# Patient Record
Sex: MALE | Race: BLACK OR AFRICAN AMERICAN | Employment: FULL TIME | URBAN - METROPOLITAN AREA
[De-identification: names, ages, dates, MRNs, and addresses within clinical notes are randomized per-mention and may not be internally consistent; named-entity substitution may affect disease eponyms.]

---

## 2017-01-15 ENCOUNTER — HOSPITAL ENCOUNTER (EMERGENCY)
Age: 57
Discharge: HOME | End: 2017-01-15
Attending: EMERGENCY MEDICINE
Payer: COMMERCIAL

## 2017-01-15 VITALS
DIASTOLIC BLOOD PRESSURE: 94 MMHG | HEIGHT: 73 IN | OXYGEN SATURATION: 98 % | WEIGHT: 245 LBS | TEMPERATURE: 97.7 F | HEART RATE: 105 BPM | SYSTOLIC BLOOD PRESSURE: 147 MMHG | RESPIRATION RATE: 16 BRPM | BODY MASS INDEX: 32.47 KG/M2

## 2017-01-15 DIAGNOSIS — H92.01 OTALGIA, RIGHT: Primary | ICD-10-CM

## 2017-01-15 DIAGNOSIS — J02.9 VIRAL PHARYNGITIS: ICD-10-CM

## 2017-01-15 LAB — DEPRECATED S PYO AG THROAT QL EIA: NEGATIVE

## 2017-01-15 PROCEDURE — 87880 STREP A ASSAY W/OPTIC: CPT | Performed by: PHYSICIAN ASSISTANT

## 2017-01-15 PROCEDURE — 74011000250 HC RX REV CODE- 250: Performed by: PHYSICIAN ASSISTANT

## 2017-01-15 PROCEDURE — 99283 EMERGENCY DEPT VISIT LOW MDM: CPT

## 2017-01-15 PROCEDURE — 87070 CULTURE OTHR SPECIMN AEROBIC: CPT | Performed by: EMERGENCY MEDICINE

## 2017-01-15 RX ORDER — LIDOCAINE HYDROCHLORIDE 20 MG/ML
15 SOLUTION OROPHARYNGEAL
Status: COMPLETED | OUTPATIENT
Start: 2017-01-15 | End: 2017-01-15

## 2017-01-15 RX ORDER — ACETAMINOPHEN AND CODEINE PHOSPHATE 120; 12 MG/5ML; MG/5ML
1 SOLUTION ORAL
Qty: 120 ML | Refills: 0 | Status: SHIPPED | OUTPATIENT
Start: 2017-01-15 | End: 2018-02-10

## 2017-01-15 RX ADMIN — LIDOCAINE HYDROCHLORIDE 15 ML: 20 SOLUTION ORAL; TOPICAL at 22:20

## 2017-01-16 NOTE — ED NOTES
Patient educated on discharge instructions and one prescription . Patient verbalized understanding of eduction. Patient given discharge instructions and one prescription. Patient ambulated out of ED . No acute distress noted. Emergency Department Nursing Plan of Care       The Nursing Plan of Care is developed from the Nursing assessment and Emergency Department Attending provider initial evaluation. The plan of care may be reviewed in the ED Provider note.     The Plan of Care was developed with the following considerations:   Patient / Family readiness to learn indicated by:verbalized understanding  Persons(s) to be included in education: patient  Barriers to Learning/Limitations:No    Signed     Kelly Antunez RN    1/15/2017   10:55 PM

## 2017-01-16 NOTE — ED NOTES
Patient presented to the ED today for complaints of sore throat. Patient says symptoms started today. Respirations are even and unlabored. Skin is dry,wrm, and intact.

## 2017-01-16 NOTE — DISCHARGE INSTRUCTIONS
Earache: Care Instructions  Your Care Instructions  Even though infection is a common cause of ear pain, not all ear pain means an infection. If you have ear pain and don't have an infection, it could be because of a jaw problem, such as temporomandibular joint (TMJ) pain. Or it could be because of a neck problem. When ear discomfort or pain is mild or comes and goes without other symptoms, home treatment may be all you need. Follow-up care is a key part of your treatment and safety. Be sure to make and go to all appointments, and call your doctor if you are having problems. It's also a good idea to know your test results and keep a list of the medicines you take. How can you care for yourself at home? · Apply heat on the ear to ease pain. To apply heat, put a warm water bottle, a heating pad set on low, or a warm cloth on your ear. Do not go to sleep with a heating pad on your skin. · Take an over-the-counter pain medicine, such as acetaminophen (Tylenol), ibuprofen (Advil, Motrin), or naproxen (Aleve). Be safe with medicines. Read and follow all instructions on the label. · Do not take two or more pain medicines at the same time unless the doctor told you to. Many pain medicines have acetaminophen, which is Tylenol. Too much acetaminophen (Tylenol) can be harmful. · Never insert anything, such as a cotton swab or a sapna pin, into the ear. When should you call for help? Call your doctor now or seek immediate medical care if:  · You have a fever. · You feel a lump in your neck or jaw. · The area around the ear starts to swell. · There is pus or blood draining from the ear. · There is new or different drainage from the ear. Watch closely for changes in your health, and be sure to contact your doctor if:  · Your pain gets worse. · You do not get better as expected. Where can you learn more? Go to http://yolanda-edil.info/.   Enter R111 in the search box to learn more about \"Earache: Care Instructions. \"  Current as of: July 29, 2016  Content Version: 11.1  © 0752-7892 [employer]. Care instructions adapted under license by [employer] (which disclaims liability or warranty for this information). If you have questions about a medical condition or this instruction, always ask your healthcare professional. [address] any warranty or liability for your use of this information. Sore Throat: Care Instructions  Your Care Instructions    Infection by bacteria or a virus causes most sore throats. Cigarette smoke, dry air, air pollution, allergies, and yelling can also cause a sore throat. Sore throats can be painful and annoying. Fortunately, most sore throats go away on their own. If you have a bacterial infection, your doctor may prescribe antibiotics. Follow-up care is a key part of your treatment and safety. Be sure to make and go to all appointments, and call your doctor if you are having problems. It's also a good idea to know your test results and keep a list of the medicines you take. How can you care for yourself at home? · If your doctor prescribed antibiotics, take them as directed. Do not stop taking them just because you feel better. You need to take the full course of antibiotics. · Gargle with warm salt water once an hour to help reduce swelling and relieve discomfort. Use 1 teaspoon of salt mixed in 1 cup of warm water. · Take an over-the-counter pain medicine, such as acetaminophen (Tylenol), ibuprofen (Advil, Motrin), or naproxen (Aleve). Read and follow all instructions on the label. · Be careful when taking over-the-counter cold or flu medicines and Tylenol at the same time. Many of these medicines have acetaminophen, which is Tylenol. Read the labels to make sure that you are not taking more than the recommended dose. Too much acetaminophen (Tylenol) can be harmful. · Drink plenty of fluids.  Fluids may help soothe an irritated throat. Hot fluids, such as tea or soup, may help decrease throat pain. · Use over-the-counter throat lozenges to soothe pain. Regular cough drops or hard candy may also help. These should not be given to young children because of the risk of choking. · Do not smoke or allow others to smoke around you. If you need help quitting, talk to your doctor about stop-smoking programs and medicines. These can increase your chances of quitting for good. · Use a vaporizer or humidifier to add moisture to your bedroom. Follow the directions for cleaning the machine. When should you call for help? Call your doctor now or seek immediate medical care if:  · You have new or worse trouble swallowing. · Your sore throat gets much worse on one side. Watch closely for changes in your health, and be sure to contact your doctor if you do not get better as expected. Where can you learn more? Go to http://yolanda-edil.info/. Enter [id number] in the search box to learn more about \"Sore Throat: Care Instructions. \"  Current as of: July 29, 2016  Content Version: 11.1  © 3368-3863 [employer]. Care instructions adapted under license by [employer] (which disclaims liability or warranty for this information). If you have questions about a medical condition or this instruction, always ask your healthcare professional. [address] any warranty or liability for your use of this information.

## 2017-01-16 NOTE — ED PROVIDER NOTES
Patient is a 49 y.o. male presenting with sore throat. Sore Throat    This is a new problem. The current episode started less than 1 hour ago (pt reports immediate onset of ear and throat pain. Pt c/o painful swallowing as well. Pt reports recently getting \"partials\" over [place] time but unsure if that is related to symptoms today. Pain 10/10). The problem has not changed since onset. There has been no fever. Associated symptoms include ear pain. Pertinent negatives include no vomiting, no congestion, no shortness of breath, no stridor, no swollen glands, no trouble swallowing, no stiff neck and no cough. He has had no exposure to strep. He has tried nothing for the symptoms. Past Medical History:   Diagnosis Date    Gastrointestinal disorder      GERDS    GERD (gastroesophageal reflux disease)        Past Surgical History:   Procedure Laterality Date    Hx appendectomy      Hx other surgical       right eye surgery    Hx heent       right eye surgery    Hx urological       laser surgery on \"my private parts\"         History reviewed. No pertinent family history. Social History     Social History    Marital status:      Spouse name: N/A    Number of children: N/A    Years of education: N/A     Occupational History    Not on file. Social History Main Topics    Smoking status: Never Smoker    Smokeless tobacco: Not on file    Alcohol use No    Drug use: No    Sexual activity: Not on file     Other Topics Concern    Not on file     Social History Narrative         ALLERGIES: Review of patient's allergies indicates no known allergies. Review of Systems   Constitutional: Negative for fever. HENT: Positive for ear pain and sore throat. Negative for congestion and trouble swallowing. Respiratory: Negative for cough, shortness of breath and stridor. Gastrointestinal: Negative for vomiting. All other systems reviewed and are negative.       Vitals:    01/15/17 2205 BP: (!) 147/94   Pulse: (!) 108   Resp: 16   Temp: 97.7 °F (36.5 °C)   SpO2: 98%   Weight: 111.1 kg (245 lb)   Height: 6' 1\" (1.854 m)            Physical Exam   Constitutional: He is oriented to person, place, and time. He appears well-developed and well-nourished. No distress. HENT:   Head: Normocephalic and atraumatic. Right Ear: Tympanic membrane normal.   Left Ear: Tympanic membrane normal.   Mouth/Throat: Uvula is midline, oropharynx is clear and moist and mucous membranes are normal. No oropharyngeal exudate, posterior oropharyngeal edema, posterior oropharyngeal erythema or tonsillar abscesses. Eyes: Conjunctivae are normal.   Cardiovascular: Normal rate, regular rhythm and normal heart sounds. Pulmonary/Chest: Effort normal and breath sounds normal. No respiratory distress. He has no wheezes. He has no rales. Musculoskeletal: Normal range of motion. Lymphadenopathy:        Head (right side): Submandibular adenopathy present. Head (left side): Submandibular adenopathy present. He has cervical adenopathy. Neurological: He is alert and oriented to person, place, and time. Skin: Skin is warm and dry. Psychiatric: He has a normal mood and affect. His behavior is normal. Judgment and thought content normal.   Nursing note and vitals reviewed. MDM  Number of Diagnoses or Management Options  Diagnosis management comments: DDX: otalgia, OM, lymphadenopathy, lymphadenitis    Progress Note:  10:45 PM  Pt reevaluated, lidocaine helped pain. Pt advised of neg strep but will f/u if culture is positive.   Will send home with pain meds to use prn       Amount and/or Complexity of Data Reviewed  Clinical lab tests: ordered and reviewed      ED Course       Procedures

## 2017-01-18 LAB
BACTERIA SPEC CULT: NORMAL
SERVICE CMNT-IMP: NORMAL

## 2017-03-01 ENCOUNTER — HOSPITAL ENCOUNTER (OUTPATIENT)
Dept: LAB | Age: 57
Discharge: HOME | End: 2017-03-01

## 2017-03-01 PROCEDURE — 99001 SPECIMEN HANDLING PT-LAB: CPT | Performed by: INTERNAL MEDICINE

## 2017-03-09 ENCOUNTER — HOSPITAL ENCOUNTER (OUTPATIENT)
Dept: CT IMAGING | Age: 57
Discharge: HOME | End: 2017-03-09
Attending: INTERNAL MEDICINE
Payer: COMMERCIAL

## 2017-03-09 DIAGNOSIS — R04.2 HEMOPTYSIS: ICD-10-CM

## 2017-03-09 PROCEDURE — 71250 CT THORAX DX C-: CPT

## 2018-02-10 ENCOUNTER — HOSPITAL ENCOUNTER (EMERGENCY)
Age: 58
Discharge: HOME | End: 2018-02-10
Attending: EMERGENCY MEDICINE
Payer: COMMERCIAL

## 2018-02-10 ENCOUNTER — APPOINTMENT (OUTPATIENT)
Dept: GENERAL RADIOLOGY | Age: 58
End: 2018-02-10
Attending: PHYSICIAN ASSISTANT
Payer: COMMERCIAL

## 2018-02-10 VITALS
OXYGEN SATURATION: 96 % | BODY MASS INDEX: 32.2 KG/M2 | SYSTOLIC BLOOD PRESSURE: 145 MMHG | DIASTOLIC BLOOD PRESSURE: 92 MMHG | TEMPERATURE: 99.8 F | HEART RATE: 108 BPM | RESPIRATION RATE: 16 BRPM | WEIGHT: 243 LBS | HEIGHT: 73 IN

## 2018-02-10 DIAGNOSIS — J06.9 ACUTE UPPER RESPIRATORY INFECTION: Primary | ICD-10-CM

## 2018-02-10 PROCEDURE — 99282 EMERGENCY DEPT VISIT SF MDM: CPT

## 2018-02-10 PROCEDURE — 71046 X-RAY EXAM CHEST 2 VIEWS: CPT

## 2018-02-10 RX ORDER — AZITHROMYCIN 250 MG/1
TABLET, FILM COATED ORAL
Qty: 6 TAB | Refills: 0 | Status: SHIPPED | OUTPATIENT
Start: 2018-02-10 | End: 2018-07-07

## 2018-02-10 RX ORDER — LORATADINE 10 MG/1
10 TABLET ORAL DAILY
Qty: 10 TAB | Refills: 0 | Status: SHIPPED | OUTPATIENT
Start: 2018-02-10 | End: 2019-07-11

## 2018-02-10 RX ORDER — GUAIFENESIN, PSEUDOEPHEDRINE HYDROCHLORIDE 600; 60 MG/1; MG/1
1 TABLET, EXTENDED RELEASE ORAL EVERY 12 HOURS
COMMUNITY
End: 2019-07-11

## 2018-02-10 RX ORDER — BENZONATATE 200 MG/1
200 CAPSULE ORAL
Qty: 15 CAP | Refills: 0 | Status: SHIPPED | OUTPATIENT
Start: 2018-02-10 | End: 2018-07-07

## 2018-02-10 RX ORDER — IBUPROFEN 800 MG/1
800 TABLET ORAL
Qty: 20 TAB | Refills: 0 | Status: SHIPPED | OUTPATIENT
Start: 2018-02-10 | End: 2018-02-17

## 2018-02-10 NOTE — DISCHARGE INSTRUCTIONS
Upper Respiratory Infection (Cold): Care Instructions  Your Care Instructions    An upper respiratory infection, or URI, is an infection of the nose, sinuses, or throat. URIs are spread by coughs, sneezes, and direct contact. The common cold is the most frequent kind of URI. The flu and sinus infections are other kinds of URIs. Almost all URIs are caused by viruses. Antibiotics won't cure them. But you can treat most infections with home care. This may include drinking lots of fluids and taking over-the-counter pain medicine. You will probably feel better in 4 to 10 days. The doctor has checked you carefully, but problems can develop later. If you notice any problems or new symptoms, get medical treatment right away. Follow-up care is a key part of your treatment and safety. Be sure to make and go to all appointments, and call your doctor if you are having problems. It's also a good idea to know your test results and keep a list of the medicines you take. How can you care for yourself at home? · To prevent dehydration, drink plenty of fluids, enough so that your urine is light yellow or clear like water. Choose water and other caffeine-free clear liquids until you feel better. If you have kidney, heart, or liver disease and have to limit fluids, talk with your doctor before you increase the amount of fluids you drink. · Take an over-the-counter pain medicine, such as acetaminophen (Tylenol), ibuprofen (Advil, Motrin), or naproxen (Aleve). Read and follow all instructions on the label. · Before you use cough and cold medicines, check the label. These medicines may not be safe for young children or for people with certain health problems. · Be careful when taking over-the-counter cold or flu medicines and Tylenol at the same time. Many of these medicines have acetaminophen, which is Tylenol. Read the labels to make sure that you are not taking more than the recommended dose.  Too much acetaminophen (Tylenol) can be harmful. · Get plenty of rest.  · Do not smoke or allow others to smoke around you. If you need help quitting, talk to your doctor about stop-smoking programs and medicines. These can increase your chances of quitting for good. When should you call for help? Call 911 anytime you think you may need emergency care. For example, call if:  ? · You have severe trouble breathing. ?Call your doctor now or seek immediate medical care if:  ? · You seem to be getting much sicker. ? · You have new or worse trouble breathing. ? · You have a new or higher fever. ? · You have a new rash. ? Watch closely for changes in your health, and be sure to contact your doctor if:  ? · You have a new symptom, such as a sore throat, an earache, or sinus pain. ? · You cough more deeply or more often, especially if you notice more mucus or a change in the color of your mucus. ? · You do not get better as expected. Where can you learn more? Go to http://yolanda-edil.info/. Enter E075 in the search box to learn more about \"Upper Respiratory Infection (Cold): Care Instructions. \"  Current as of: May 12, 2017  Content Version: 11.4  © 7893-2240 [employer]. Care instructions adapted under license by [employer] (which disclaims liability or warranty for this information). If you have questions about a medical condition or this instruction, always ask your healthcare professional. [place] [address] any warranty or liability for your use of this information.

## 2018-02-10 NOTE — ED PROVIDER NOTES
EMERGENCY DEPARTMENT HISTORY AND PHYSICAL EXAM    Date: 2/10/2018  Patient Name: Sumaya Reaves    History of Presenting Illness     Chief Complaint   Patient presents with    Cough     and congestion starting this past monday. chills. History Provided By: Patient      HPI: Sumaya Reaves is a 56 y.o. male with a PMH of GERD who presents with c/o cough since Monday and now L ear pain and sore throat. Pt states he has been taking OTC cough medicine and sinus medicine with no relief. Pt reports generalized pain 10/10. No exacerbating factors. PCP: Thelma Bruno MD    Current Outpatient Prescriptions   Medication Sig Dispense Refill    PSEUDOEPHEDRINE-guaiFENesin [facility] D)  mg per tablet Take 1 Tab by mouth every twelve (12) hours.  azithromycin (ZITHROMAX) 250 mg tablet Take two tablets today then one tablet daily 6 Tab 0    ibuprofen (MOTRIN) 800 mg tablet Take 1 Tab by mouth every six (6) hours as needed for Pain for up to 7 days. 20 Tab 0    loratadine (CLARITIN) 10 mg tablet Take 1 Tab by mouth daily. 10 Tab 0    benzonatate (TESSALON) 200 mg capsule Take 1 Cap by mouth three (3) times daily as needed for Cough. 15 Cap 0       Past History     Past Medical History:  Past Medical History:   Diagnosis Date    Gastrointestinal disorder     GERDS    GERD (gastroesophageal reflux disease)        Past Surgical History:  Past Surgical History:   Procedure Laterality Date    HX APPENDECTOMY      HX HEENT      right eye surgery    HX OTHER SURGICAL      right eye surgery    HX UROLOGICAL      laser surgery on \"my private parts\"       Family History:  History reviewed. No pertinent family history. Social History:  Social History   Substance Use Topics    Smoking status: Never Smoker    Smokeless tobacco: Never Used    Alcohol use No       Allergies:  No Known Allergies      Review of Systems   Review of Systems   Constitutional: Negative for fever.    HENT: Positive for congestion, ear pain and sore throat. Respiratory: Positive for cough. Negative for shortness of breath, wheezing and stridor. Neurological: Negative for speech difficulty and weakness. All other systems reviewed and are negative. Physical Exam     Vitals:    02/10/18 1421   BP: (!) 145/92   Pulse: (!) 108   Resp: 16   Temp: 99.8 °F (37.7 °C)   SpO2: 96%   Weight: 110.2 kg (243 lb)   Height: 6' 1\" (1.854 m)     Physical Exam   Constitutional: He is oriented to person, place, and time. He appears well-developed and well-nourished. No distress. HENT:   Head: Normocephalic and atraumatic. Right Ear: Tympanic membrane normal.   Left Ear: Tympanic membrane normal.   Mouth/Throat: Uvula is midline, oropharynx is clear and moist and mucous membranes are normal. No oropharyngeal exudate. Eyes: Conjunctivae are normal.   Neck: Normal range of motion. Cardiovascular: Normal rate, regular rhythm and normal heart sounds. Pulmonary/Chest: Effort normal and breath sounds normal. No respiratory distress. He has no wheezes. He has no rales. Musculoskeletal: Normal range of motion. Neurological: He is alert and oriented to person, place, and time. Skin: Skin is warm and dry. Psychiatric: He has a normal mood and affect. His behavior is normal. Judgment and thought content normal.   Nursing note and vitals reviewed. at 3:22 PM        Diagnostic Study Results     Labs -   No results found for this or any previous visit (from the past 12 hour(s)). Radiologic Studies -   XR CHEST PA LAT   Final Result        CT Results  (Last 48 hours)    None        CXR Results  (Last 48 hours)               02/10/18 1440  XR CHEST PA LAT Final result    Impression:  Impression:   1. No acute cardiopulmonary disease           Narrative:  INDICATION:  cough x 1 wk        Exam: Chest 2 views. Comparison: October 17, 2013.         Findings: Cardiomediastinal silhouette is normal. Pulmonary vasculature is not engorged. No focal parenchymal opacities, effusions, or pneumothorax. Bony   thorax is intact. Medical Decision Making   I am the first provider for this patient. I reviewed the vital signs, available nursing notes, past medical history, past surgical history, family history and social history. Vital Signs-Reviewed the patient's vital signs. Disposition:  Discharged    DISCHARGE NOTE:   3:22 PM      Care plan outlined and precautions discussed. Patient has no new complaints, changes, or physical findings. Results of CXR were reviewed with the patient. All medications were reviewed with the patient; will d/c home. All of pt's questions and concerns were addressed. Patient was instructed and agrees to follow up with PCP, as well as to return to the ED upon further deterioration. Patient is ready to go home. Follow-up Information     Follow up With Details Comments [address], [place] Schedule an appointment as soon as possible for a visit in 2 days As needed [address] [place] [address]  [phone]            Current Discharge Medication List      START taking these medications    Details   azithromycin (ZITHROMAX) 250 mg tablet Take two tablets today then one tablet daily  Qty: 6 Tab, Refills: 0      ibuprofen (MOTRIN) 800 mg tablet Take 1 Tab by mouth every six (6) hours as needed for Pain for up to 7 days. Qty: 20 Tab, Refills: 0      loratadine (CLARITIN) 10 mg tablet Take 1 Tab by mouth daily. Qty: 10 Tab, Refills: 0      benzonatate (TESSALON) 200 mg capsule Take 1 Cap by mouth three (3) times daily as needed for Cough. Qty: 15 Cap, Refills: 0         CONTINUE these medications which have NOT CHANGED    Details   PSEUDOEPHEDRINE-guaiFENesin (MUCINEX D)  mg per tablet Take 1 Tab by mouth every twelve (12) hours.              Provider Notes (Medical Decision Making):   DDX: URI, PNA, bronchitis    Procedures        Diagnosis     Clinical Impression:   1.  Acute upper respiratory infection

## 2018-02-10 NOTE — ED NOTES
Emergency Department Nursing Plan of Care       The Nursing Plan of Care is developed from the Nursing assessment and Emergency Department Attending provider initial evaluation. The plan of care may be reviewed in the ED Provider note.     The Plan of Care was developed with the following considerations:   Patient / Family readiness to learn indicated by:verbalized understanding  Persons(s) to be included in education: patient  Barriers to Learning/Limitations:No    [address] [place], RN    2/10/2018   2:55 PM

## 2018-07-07 ENCOUNTER — HOSPITAL ENCOUNTER (EMERGENCY)
Age: 58
Discharge: HOME | End: 2018-07-07
Attending: EMERGENCY MEDICINE
Payer: COMMERCIAL

## 2018-07-07 VITALS
OXYGEN SATURATION: 99 % | RESPIRATION RATE: 16 BRPM | TEMPERATURE: 97.5 F | BODY MASS INDEX: 31.56 KG/M2 | SYSTOLIC BLOOD PRESSURE: 150 MMHG | HEART RATE: 83 BPM | DIASTOLIC BLOOD PRESSURE: 98 MMHG | HEIGHT: 72 IN | WEIGHT: 233 LBS

## 2018-07-07 DIAGNOSIS — L02.92 BOIL: Primary | ICD-10-CM

## 2018-07-07 PROCEDURE — 99283 EMERGENCY DEPT VISIT LOW MDM: CPT

## 2018-07-07 PROCEDURE — 74011250637 HC RX REV CODE- 250/637: Performed by: EMERGENCY MEDICINE

## 2018-07-07 RX ORDER — SULFAMETHOXAZOLE AND TRIMETHOPRIM 800; 160 MG/1; MG/1
1 TABLET ORAL 2 TIMES DAILY
Qty: 20 TAB | Refills: 0 | Status: SHIPPED | OUTPATIENT
Start: 2018-07-07 | End: 2018-07-17

## 2018-07-07 RX ORDER — SULFAMETHOXAZOLE AND TRIMETHOPRIM 800; 160 MG/1; MG/1
1 TABLET ORAL
Status: COMPLETED | OUTPATIENT
Start: 2018-07-07 | End: 2018-07-07

## 2018-07-07 RX ADMIN — SULFAMETHOXAZOLE AND TRIMETHOPRIM 1 TABLET: 800; 160 TABLET ORAL at 09:48

## 2018-07-07 NOTE — ED TRIAGE NOTES
States that he has abscess near umbilicus. Used hot water compresses until it burst. Questions infection.

## 2018-07-07 NOTE — ED NOTES
Discharge instructions were given to the patient by JOSE Colbert RN. .     The patient left the Emergency Department ambulatory, alert and oriented and in no acute distress with 1 prescription(s). The patient was encouraged to call or return to the ED for worsening symptoms or problems and was encouraged to schedule a follow up appointment for continuing care. Patient leaving ED accompanied by self. The patient verbalized understanding of discharge instructions and prescriptions, all questions were answered. The patient has no further concerns at this time. Patient declined wheelchair transfer upon ED discharge.

## 2018-07-07 NOTE — ED PROVIDER NOTES
EMERGENCY DEPARTMENT HISTORY AND PHYSICAL EXAM      Date: 7/7/2018  Patient Name: Orlando Patient    History of Presenting Illness     Chief Complaint   Patient presents with    Abscess       History Provided By: Patient    HPI: Orlando Patient, 66 y.o. male with PMHx significant for GERD, presents ambulatory to the ED with cc of boil to lower abdomen for the past 3-4 days. He states he applied a warm compress to boil until it burst. He reports similar episodes in the past. He denies fever, chills, NVD, or weakness. There are no other complaints, changes, or physical findings at this time. PCP: Jessica Peña MD    Current Facility-Administered Medications   Medication Dose Route Frequency Provider Last Rate Last Dose    trimethoprim-sulfamethoxazole (BACTRIM DS, SEPTRA DS) 160-800 mg per tablet 1 Tab  1 Tab Oral NOW Immanuel Arnold MD         Current Outpatient Prescriptions   Medication Sig Dispense Refill    trimethoprim-sulfamethoxazole (BACTRIM DS) 160-800 mg per tablet Take 1 Tab by mouth two (2) times a day for 10 days. 20 Tab 0    PSEUDOEPHEDRINE-guaiFENesin (MUCINEX D)  mg per tablet Take 1 Tab by mouth every twelve (12) hours.  azithromycin (ZITHROMAX) 250 mg tablet Take two tablets today then one tablet daily 6 Tab 0    loratadine (CLARITIN) 10 mg tablet Take 1 Tab by mouth daily. 10 Tab 0    benzonatate (TESSALON) 200 mg capsule Take 1 Cap by mouth three (3) times daily as needed for Cough. 15 Cap 0       Past History     Past Medical History:  Past Medical History:   Diagnosis Date    Gastrointestinal disorder     GERDS    GERD (gastroesophageal reflux disease)        Past Surgical History:  Past Surgical History:   Procedure Laterality Date    HX APPENDECTOMY      HX HEENT      right eye surgery    HX OTHER SURGICAL      right eye surgery    HX UROLOGICAL      laser surgery on \"my private parts\"       Family History:  No family history on file.     Social History:  Social History   Substance Use Topics    Smoking status: Never Smoker    Smokeless tobacco: Never Used    Alcohol use No       Allergies:  No Known Allergies      Review of Systems   Review of Systems   Constitutional: Negative for fever. HENT: Negative for sore throat and trouble swallowing. Eyes: Negative for photophobia and redness. Respiratory: Negative for cough and shortness of breath. Cardiovascular: Negative for chest pain and leg swelling. Gastrointestinal: Negative for abdominal pain, constipation, diarrhea, nausea and vomiting. Endocrine: Negative for polydipsia and polyuria. Genitourinary: Negative for dysuria, hematuria and scrotal swelling. Musculoskeletal: Negative for back pain and joint swelling. Skin: Positive for wound (boil to lower abdomen). Negative for rash. Neurological: Negative for dizziness, syncope, weakness and headaches. Psychiatric/Behavioral: Negative for suicidal ideas. All other systems reviewed and are negative. Physical Exam   Physical Exam   Constitutional: He is oriented to person, place, and time. He appears well-developed and well-nourished. No distress. HENT:   Head: Normocephalic and atraumatic. Mouth/Throat: Oropharynx is clear and moist. No oropharyngeal exudate. Eyes: Conjunctivae and EOM are normal. Pupils are equal, round, and reactive to light. Left eye exhibits no discharge. Neck: Normal range of motion. Neck supple. No JVD present. Cardiovascular: Normal rate, regular rhythm, normal heart sounds and intact distal pulses. Pulmonary/Chest: Effort normal and breath sounds normal. No respiratory distress. He has no wheezes. Abdominal: Soft. Bowel sounds are normal. He exhibits no distension. There is no tenderness. There is no rebound and no guarding. A hernia is present. Umbilical hernia that is soft and reducible   Musculoskeletal: Normal range of motion. He exhibits no edema or tenderness.    Lymphadenopathy:     He has no cervical adenopathy. Neurological: He is alert and oriented to person, place, and time. He has normal reflexes. No cranial nerve deficit. Skin: Skin is warm and dry. No rash noted. Draining boil to RLQ of anterior abdominal wall with surrounding erythema draining pus. No crepitus   Psychiatric: He has a normal mood and affect. His behavior is normal.   Nursing note and vitals reviewed. Diagnostic Study Results         Medical Decision Making   I am the first provider for this patient. I reviewed the vital signs, available nursing notes, past medical history, past surgical history, family history and social history. Vital Signs-Reviewed the patient's vital signs. Patient Vitals for the past 12 hrs:   Temp Pulse Resp BP SpO2   07/07/18 0941 97.5 °F (36.4 °C) 83 16 (!) 150/98 99 %       Pulse Oximetry Analysis - 99% on RA    Cardiac Monitor:   Rate: 83 bpm  Rhythm: Normal Sinus Rhythm      Records Reviewed: Nursing Notes and Old Medical Records    Provider Notes (Medical Decision Making):   DDx: boil, skin abscess, umbilical hernia    ED Course:   Initial assessment performed. The patients presenting problems have been discussed, and they are in agreement with the care plan formulated and outlined with them. I have encouraged them to ask questions as they arise throughout their visit. Critical Care Time:   0 minutes    Disposition:  DISCHARGE NOTE:  9:44 AM  The patient is ready for discharge. The patients signs, symptoms, diagnosis, and instructions for discharge have been discussed and the pt has conveyed their understanding. The patient is to follow up as recommended or return to the ER should their symptoms worsen. Plan has been discussed and patient has conveyed their agreement. PLAN: Discharge home  1.    Current Discharge Medication List      START taking these medications    Details   trimethoprim-sulfamethoxazole (BACTRIM DS) 160-800 mg per tablet Take 1 Tab by mouth two (2) times a day for 10 days. Qty: 20 Tab, Refills: 0           2. Follow-up Information     Follow up With Details Comments [address], [place] In 1 week  [address] [facility]  [facility] [address] [phone]          Return to ED if worse     Diagnosis     Clinical Impression:   1. Boil        Attestations: This note is prepared by Marya Bishop, acting as Scribe for Cristhian Valle MD.    Cristhian Valle MD: The scribe's documentation has been prepared under my direction and personally reviewed by me in its entirety. I confirm that the note above accurately reflects all work, treatment, procedures, and medical decision making performed by me.

## 2018-07-07 NOTE — ED NOTES
Emergency Department Nursing Plan of Care       The Nursing Plan of Care is developed from the Nursing assessment and Emergency Department Attending provider initial evaluation. The plan of care may be reviewed in the ED Provider note. The Plan of Care was developed with the following considerations:   Patient / Family readiness to learn indicated by:verbalized understanding  Persons(s) to be included in education: patient  Barriers to Learning/Limitations:No    Signed     Jose Guadalupe Cantrell    7/7/2018   9:50 AM    See nursing assessment    Patient is alert and oriented x 4 and in no acute distress at this time. Respirations are at a regular rate, depth, and pattern. Patient updated on plan of care and has no questions or concerns at this time.

## 2019-03-15 ENCOUNTER — HOSPITAL ENCOUNTER (EMERGENCY)
Age: 59
Discharge: HOME | End: 2019-03-15
Attending: EMERGENCY MEDICINE
Payer: COMMERCIAL

## 2019-03-15 VITALS
WEIGHT: 243 LBS | HEIGHT: 72 IN | HEART RATE: 90 BPM | RESPIRATION RATE: 16 BRPM | TEMPERATURE: 97.5 F | SYSTOLIC BLOOD PRESSURE: 126 MMHG | OXYGEN SATURATION: 96 % | DIASTOLIC BLOOD PRESSURE: 114 MMHG | BODY MASS INDEX: 32.91 KG/M2

## 2019-03-15 DIAGNOSIS — L02.91 ABSCESS: Primary | ICD-10-CM

## 2019-03-15 PROCEDURE — 99282 EMERGENCY DEPT VISIT SF MDM: CPT

## 2019-03-15 RX ORDER — SULFAMETHOXAZOLE AND TRIMETHOPRIM 800; 160 MG/1; MG/1
1 TABLET ORAL 2 TIMES DAILY
Qty: 20 TAB | Refills: 0 | Status: SHIPPED | OUTPATIENT
Start: 2019-03-15 | End: 2019-03-25

## 2019-03-15 RX ORDER — CEPHALEXIN 500 MG/1
500 CAPSULE ORAL 3 TIMES DAILY
Qty: 30 CAP | Refills: 0 | Status: SHIPPED | OUTPATIENT
Start: 2019-03-15 | End: 2019-03-25

## 2019-03-15 NOTE — LETTER
[facility] EMERGENCY DEPT 
[address] [facility] [address] [id number] [phone] Work/School Note Date: 3/15/2019 To Whom It May concern: 
 
Farshad Ocampo was seen and treated today in the emergency room by the following provider(s): 
Attending Provider: Dalila Corona MD 
Physician Assistant: CRISTAL Joiner. Farshad Ocampo may return to work on 3/16/19. Sincerely, CRISTAL Vila

## 2019-03-15 NOTE — ED TRIAGE NOTES
Pt reports to ED with c/o a boil on the right side of his neck x1days. Nondraining lesion.  Hx of MRSA

## 2019-03-15 NOTE — ED NOTES
Emergency Department Nursing Plan of Care       The Nursing Plan of Care is developed from the Nursing assessment and Emergency Department Attending provider initial evaluation. The plan of care may be reviewed in the ED Provider note.     The Plan of Care was developed with the following considerations:   Patient / Family readiness to learn indicated by:verbalized understanding  Persons(s) to be included in education: patient  Barriers to Learning/Limitations:No    Signed     Rosales Rahman RN    3/15/2019   11:53 AM

## 2019-03-15 NOTE — ED PROVIDER NOTES
EMERGENCY DEPARTMENT HISTORY AND PHYSICAL EXAM    Date: 3/15/2019  Patient Name: Robby Parker    History of Presenting Illness     Chief Complaint   Patient presents with    Skin Problem         History Provided By: Patient        HPI: Robby Parker is a 66 y.o. male with a PMH of GERD who presents with cc of a boil to his neck that started last night. Pt states he noted it the pain last night and then it got bigger today prompting his arrival. Pt denies applying warm compress or any meds to the area. He denies any abd pain,  fevers, chills, nausea, vomiting, chest pain, shortness of breath, headache, rash, diarrhea, sweating or weight loss. All other ROS negative at this time  Pt is in no acute distress and is speaking in full sentences      PCP: Dell Corado MD    Current Outpatient Medications   Medication Sig Dispense Refill    trimethoprim-sulfamethoxazole (BACTRIM DS) 160-800 mg per tablet Take 1 Tab by mouth two (2) times a day for 10 days. 20 Tab 0    cephALEXin (KEFLEX) 500 mg capsule Take 1 Cap by mouth three (3) times daily for 10 days. 30 Cap 0    PSEUDOEPHEDRINE-guaiFENesin (MUCINEX D)  mg per tablet Take 1 Tab by mouth every twelve (12) hours.  loratadine (CLARITIN) 10 mg tablet Take 1 Tab by mouth daily. 10 Tab 0       Past History     Past Medical History:  Past Medical History:   Diagnosis Date    Gastrointestinal disorder     GERDS    GERD (gastroesophageal reflux disease)        Past Surgical History:  Past Surgical History:   Procedure Laterality Date    HX APPENDECTOMY      HX HEENT      right eye surgery    HX OTHER SURGICAL      right eye surgery    HX UROLOGICAL      laser surgery on \"my private parts\"       Family History:  History reviewed. No pertinent family history. Social History:  Social History     Tobacco Use    Smoking status: Never Smoker    Smokeless tobacco: Never Used   Substance Use Topics    Alcohol use: No    Drug use:  No Allergies:  No Known Allergies      Review of Systems   Review of Systems   Constitutional: Negative. Negative for chills and fever. HENT: Negative. Eyes: Negative. Respiratory: Negative. Negative for shortness of breath. Cardiovascular: Negative. Negative for chest pain. Gastrointestinal: Negative. Negative for abdominal pain, diarrhea, nausea and vomiting. Endocrine: Negative. Genitourinary: Negative. Musculoskeletal: Negative for arthralgias. Skin: Positive for color change and rash. Allergic/Immunologic: Negative. Neurological: Negative. Negative for headaches. Hematological: Negative. Psychiatric/Behavioral: Negative. All other systems reviewed and are negative. Physical Exam     Vitals:    03/15/19 1034   BP: (!) 126/114   Pulse: 90   Resp: 16   Temp: 97.5 °F (36.4 °C)   SpO2: 96%   Weight: 110.2 kg (243 lb)   Height: 6' (1.829 m)     Physical Exam   Constitutional: He is oriented to person, place, and time. He appears well-developed and well-nourished. No distress. HENT:   Head: Normocephalic and atraumatic. Right Ear: External ear normal.   Left Ear: External ear normal.   Nose: Nose normal.   Mouth/Throat: Oropharynx is clear and moist.   Eyes: Conjunctivae and EOM are normal. Pupils are equal, round, and reactive to light. Neck: Normal range of motion and full passive range of motion without pain. Neck supple. No JVD present. No tracheal tenderness, no spinous process tenderness and no muscular tenderness present. No neck rigidity. No tracheal deviation, no edema and normal range of motion present. No thyromegaly present. Cardiovascular: Normal rate, normal heart sounds and intact distal pulses. Pulmonary/Chest: Effort normal and breath sounds normal. No stridor. No respiratory distress. He has no wheezes. He has no rales. Abdominal: Soft. Bowel sounds are normal. He exhibits no distension. There is no tenderness.  There is no rebound, no guarding, no CVA tenderness, no tenderness at McBurney's point and negative Gipson's sign. Musculoskeletal: Normal range of motion. Lymphadenopathy:     He has no cervical adenopathy. Neurological: He is alert and oriented to person, place, and time. He has normal reflexes. He displays normal reflexes. No cranial nerve deficit. He exhibits normal muscle tone. Coordination normal.   Skin: No rash noted. He is not diaphoretic. Psychiatric: He has a normal mood and affect. His behavior is normal. Judgment and thought content normal.   Nursing note and vitals reviewed. Diagnostic Study Results     Labs -   No results found for this or any previous visit (from the past 12 hour(s)). Radiologic Studies -   No orders to display     CT Results  (Last 48 hours)    None        CXR Results  (Last 48 hours)    None            Medical Decision Making   I am the first provider for this patient. I reviewed the vital signs, available nursing notes, past medical history, past surgical history, family history and social history. Vital Signs-Reviewed the patient's vital signs. Records Reviewed: Nursing Notes, Old Medical Records, Previous Radiology Studies and Previous Laboratory Studies    Disposition:  Discharge     DISCHARGE NOTE:   Care plan outlined and precautions discussed. Patient has no new complaints, changes, or physical findings. Results of visit were reviewed with the patient. All medications were reviewed with the patient; will d/c home. All of pt's questions and concerns were addressed. Patient was instructed and agrees to follow up with pcp, as well as to return to the ED upon further deterioration. Patient is ready to go home.       Follow-up Information     Follow up With Specialties Details Why Contact Info    Twyla Parker MD [facility] Schedule an appointment as soon as possible for a visit in 3 days If symptoms worsen [address] [place] [address]  [phone] Discharge Medication List as of 3/15/2019 11:37 AM      START taking these medications    Details   trimethoprim-sulfamethoxazole (BACTRIM DS) 160-800 mg per tablet Take 1 Tab by mouth two (2) times a day for 10 days. , Normal, Disp-20 Tab, R-0      cephALEXin (KEFLEX) 500 mg capsule Take 1 Cap by mouth three (3) times daily for 10 days. , Normal, Disp-30 Cap, R-0         CONTINUE these medications which have NOT CHANGED    Details   PSEUDOEPHEDRINE-guaiFENesin (MUCINEX D)  mg per tablet Take 1 Tab by mouth every twelve (12) hours. , Historical Med      loratadine (CLARITIN) 10 mg tablet Take 1 Tab by mouth daily. , Normal, Disp-10 Tab, R-0             Provider Notes (Medical Decision Making): Abscess to anterior neck, no area of fluctuance will advise warm compress with abx coverage and to return in 2-3 days for recheck   Worsening si/sxs discussed extensively   Follow up with PCP or RTC if symptoms/signs worsen  Side effects of medication discussed  Education materials provided at discharge   Pt verbalizes agreement with plan      Procedures:  Procedures        Diagnosis     Clinical Impression:   1.  Abscess

## 2019-03-15 NOTE — DISCHARGE INSTRUCTIONS

## 2019-03-21 ENCOUNTER — HOSPITAL ENCOUNTER (EMERGENCY)
Age: 59
Discharge: HOME | End: 2019-03-21
Attending: EMERGENCY MEDICINE
Payer: COMMERCIAL

## 2019-03-21 VITALS
HEIGHT: 72 IN | BODY MASS INDEX: 31.83 KG/M2 | OXYGEN SATURATION: 100 % | DIASTOLIC BLOOD PRESSURE: 93 MMHG | SYSTOLIC BLOOD PRESSURE: 127 MMHG | WEIGHT: 235 LBS | HEART RATE: 108 BPM | TEMPERATURE: 98.4 F | RESPIRATION RATE: 18 BRPM

## 2019-03-21 DIAGNOSIS — R68.89 FLU-LIKE SYMPTOMS: Primary | ICD-10-CM

## 2019-03-21 PROCEDURE — 99283 EMERGENCY DEPT VISIT LOW MDM: CPT

## 2019-03-21 RX ORDER — ACETAMINOPHEN 500 MG
1000 TABLET ORAL
Qty: 20 TAB | Refills: 0 | Status: SHIPPED | OUTPATIENT
Start: 2019-03-21 | End: 2019-08-21

## 2019-03-21 RX ORDER — IBUPROFEN 800 MG/1
800 TABLET ORAL
Qty: 20 TAB | Refills: 0 | Status: SHIPPED | OUTPATIENT
Start: 2019-03-21 | End: 2019-03-28

## 2019-03-21 NOTE — DISCHARGE INSTRUCTIONS
Patient Education        Influenza (Flu): Care Instructions  Your Care Instructions    Influenza (flu) is an infection in the lungs and breathing passages. It is caused by the influenza virus. There are different strains, or types, of the flu virus from year to year. Unlike the common cold, the flu comes on suddenly and the symptoms, such as a cough, congestion, fever, chills, fatigue, aches, and pains, are more severe. These symptoms may last up to 10 days. Although the flu can make you feel very sick, it usually doesn't cause serious health problems. Home treatment is usually all you need for flu symptoms. But your doctor may prescribe antiviral medicine to prevent other health problems, such as pneumonia, from developing. Older people and those who have a long-term health condition, such as lung disease, are most at risk for having pneumonia or other health problems. Follow-up care is a key part of your treatment and safety. Be sure to make and go to all appointments, and call your doctor if you are having problems. It's also a good idea to know your test results and keep a list of the medicines you take. How can you care for yourself at home? · Get plenty of rest.  · Drink plenty of fluids, enough so that your urine is light yellow or clear like water. If you have kidney, heart, or liver disease and have to limit fluids, talk with your doctor before you increase the amount of fluids you drink. · Take an over-the-counter pain medicine if needed, such as acetaminophen (Tylenol), ibuprofen (Advil, Motrin), or naproxen (Aleve), to relieve fever, headache, and muscle aches. Read and follow all instructions on the label. No one younger than 20 should take aspirin. It has been linked to Reye syndrome, a serious illness. · Do not smoke. Smoking can make the flu worse. If you need help quitting, talk to your doctor about stop-smoking programs and medicines.  These can increase your chances of quitting for good.  · Breathe moist air from a hot shower or from a sink filled with hot water to help clear a stuffy nose. · Before you use cough and cold medicines, check the label. These medicines may not be safe for young children or for people with certain health problems. · If the skin around your nose and lips becomes sore, put some petroleum jelly on the area. · To ease coughing:  ? Drink fluids to soothe a scratchy throat. ? Suck on cough drops or plain hard candy. ? Take an over-the-counter cough medicine that contains dextromethorphan to help you get some sleep. Read and follow all instructions on the label. ? Raise your head at night with an extra pillow. This may help you rest if coughing keeps you awake. · Take any prescribed medicine exactly as directed. Call your doctor if you think you are having a problem with your medicine. To avoid spreading the flu  · Wash your hands regularly, and keep your hands away from your face. · Stay home from school, work, and other public places until you are feeling better and your fever has been gone for at least 24 hours. The fever needs to have gone away on its own without the help of medicine. · Ask people living with you to talk to their doctors about preventing the flu. They may get antiviral medicine to keep from getting the flu from you. · To prevent the flu in the future, get a flu vaccine every fall. Encourage people living with you to get the vaccine. · Cover your mouth when you cough or sneeze. When should you call for help? Call 911 anytime you think you may need emergency care.  For example, call if:    · You have severe trouble breathing.    Call your doctor now or seek immediate medical care if:    · You have new or worse trouble breathing.     · You seem to be getting much sicker.     · You feel very sleepy or confused.     · You have a new or higher fever.     · You get a new rash.    Watch closely for changes in your health, and be sure to contact your doctor if:    · You begin to get better and then get worse.     · You are not getting better after 1 week. Where can you learn more? Go to http://yolanda-edil.info/. Enter F411 in the search box to learn more about \"Influenza (Flu): Care Instructions. \"  Current as of: September 5, 2018  Content Version: 11.9  © 8922-2536 [employer]. Care instructions adapted under license by [employer] (which disclaims liability or warranty for this information). If you have questions about a medical condition or this instruction, always ask your healthcare professional. [place] [address] any warranty or liability for your use of this information.

## 2019-03-21 NOTE — ED PROVIDER NOTES
EMERGENCY DEPARTMENT HISTORY AND PHYSICAL EXAM    Date: 3/21/2019  Patient Name: Mary Saeed    History of Presenting Illness     Chief Complaint   Patient presents with    Generalized Body Aches    Cough         History Provided By: Patient      HPI: Mary Saeed is a 66 y.o. male with a PMH of GERD, appendectomy who presents with acute moderate dry cough, congestion, rhinorrhea, generalized body aches. Patient endorses symptoms started 5 days prior to arrival.  Patient has been taking Keflex and Bactrim for anterior neck abscess with moderate improvement of rash. He denies fever, chills, nausea, vomiting, abdominal pain, chest pain, shortness of breath, hemoptysis, wheezing, lightheadedness, dizziness, sore throat. No other modifying factors. PCP: Cora Vicente MD    Current Outpatient Medications   Medication Sig Dispense Refill    acetaminophen (TYLENOL) 500 mg tablet Take 2 Tabs by mouth every six (6) hours as needed for Pain. 20 Tab 0    loratadine-pseudoephedrine (CLARITIN-D 12 HOUR) 5-120 mg per tablet Take 1 Tab by mouth two (2) times a day. 30 Tab 0    ibuprofen (MOTRIN) 800 mg tablet Take 1 Tab by mouth every six (6) hours as needed for Pain for up to 7 days. 20 Tab 0    trimethoprim-sulfamethoxazole (BACTRIM DS) 160-800 mg per tablet Take 1 Tab by mouth two (2) times a day for 10 days. 20 Tab 0    cephALEXin (KEFLEX) 500 mg capsule Take 1 Cap by mouth three (3) times daily for 10 days. 30 Cap 0    PSEUDOEPHEDRINE-guaiFENesin (MUCINEX D)  mg per tablet Take 1 Tab by mouth every twelve (12) hours.  loratadine (CLARITIN) 10 mg tablet Take 1 Tab by mouth daily.  10 Tab 0       Past History     Past Medical History:  Past Medical History:   Diagnosis Date    Gastrointestinal disorder     GERDS    GERD (gastroesophageal reflux disease)        Past Surgical History:  Past Surgical History:   Procedure Laterality Date    HX APPENDECTOMY      HX HEENT      right eye surgery    HX OTHER SURGICAL      right eye surgery    HX UROLOGICAL      laser surgery on \"my private parts\"       Family History:  History reviewed. No pertinent family history. Social History:  Social History     Tobacco Use    Smoking status: Never Smoker    Smokeless tobacco: Never Used   Substance Use Topics    Alcohol use: No    Drug use: No     Types: Marijuana       Allergies:  No Known Allergies      Review of Systems   Review of Systems   Constitutional: Positive for fatigue. Negative for activity change, appetite change, chills, diaphoresis, fever and unexpected weight change. HENT: Positive for congestion and rhinorrhea. Negative for dental problem, drooling, ear discharge, ear pain, facial swelling, hearing loss, nosebleeds, postnasal drip, sinus pressure, sore throat, trouble swallowing and voice change. Eyes: Negative. Negative for pain, redness and visual disturbance. Respiratory: Positive for cough. Negative for apnea, chest tightness, shortness of breath, wheezing and stridor. Cardiovascular: Negative. Negative for chest pain. Gastrointestinal: Negative for abdominal pain, constipation, diarrhea, nausea and vomiting. Genitourinary: Negative. Negative for decreased urine volume and dysuria. Musculoskeletal: Positive for myalgias. Negative for arthralgias, neck pain and neck stiffness. Skin: Negative. Negative for rash. Neurological: Negative for dizziness, tremors, seizures, syncope, facial asymmetry, speech difficulty, weakness, light-headedness, numbness and headaches. Psychiatric/Behavioral: Negative. Negative for confusion. Physical Exam     Vitals:    03/21/19 1214 03/21/19 1215   BP: (!) 121/107 (!) 127/93   Pulse: (!) 107 (!) 108   Resp: 18    Temp: 98.4 °F (36.9 °C)    SpO2: 100%    Weight: 106.6 kg (235 lb)    Height: 6' (1.829 m)      Physical Exam   Constitutional: He is oriented to person, place, and time. He appears well-developed and well-nourished.  No distress. HENT:   Head: Normocephalic and atraumatic. Right Ear: Hearing, tympanic membrane and external ear normal.   Left Ear: Hearing, tympanic membrane and external ear normal.   Nose: Mucosal edema and rhinorrhea present. Right sinus exhibits no maxillary sinus tenderness and no frontal sinus tenderness. Left sinus exhibits no maxillary sinus tenderness and no frontal sinus tenderness. Mouth/Throat: Uvula is midline, oropharynx is clear and moist and mucous membranes are normal. Mucous membranes are not dry. No trismus in the jaw. No uvula swelling. No oropharyngeal exudate, posterior oropharyngeal edema, posterior oropharyngeal erythema or tonsillar abscesses. Eyes: Pupils are equal, round, and reactive to light. Conjunctivae and EOM are normal.   Neck: Normal range of motion, full passive range of motion without pain and phonation normal. No spinous process tenderness and no muscular tenderness present. Well-healing anterior neck nodule approximately 2 cm in size. No erythema, drainage, induration, fluctuance, streaking. Pulmonary/Chest: Effort normal and breath sounds normal. No accessory muscle usage. No respiratory distress. He has no decreased breath sounds. He has no wheezes. He has no rhonchi. He has no rales. Patient speaking in clear sentences in no apparent distress. Abdominal: There is no tenderness. Musculoskeletal: Normal range of motion. Neurological: He is alert and oriented to person, place, and time. Skin: Skin is warm, dry and intact. He is not diaphoretic. No pallor. Psychiatric: He has a normal mood and affect. His speech is normal and behavior is normal. Judgment and thought content normal.   Nursing note and vitals reviewed. Diagnostic Study Results     Labs -   No results found for this or any previous visit (from the past 12 hour(s)).     Radiologic Studies -   No orders to display     CT Results  (Last 48 hours)    None        CXR Results  (Last 48 hours)    None            Medical Decision Making   I am the first provider for this patient. I reviewed the vital signs, available nursing notes, past medical history, past surgical history, family history and social history. Vital Signs-Reviewed the patient's vital signs. Records Reviewed: Nursing Notes, Old Medical Records, Previous Radiology Studies and Previous Laboratory Studies            Disposition:    DISCHARGE NOTE:   12:36 PM      Care plan outlined and precautions discussed. Patient has no new complaints, changes, or physical findings. Results of exam were reviewed with the patient. All medications were reviewed with the patient; will d/c home with Claritin-D, Tylenol, ibuprofen, continued antibiotics. All of pt's questions and concerns were addressed. Patient was instructed and agrees to follow up with PCP, as well as to return to the ED upon further deterioration. Patient is ready to go home. Follow-up Information     Follow up With Specialties Details Why Contact Info    Flaquito Evans MD [facility] Schedule an appointment as soon as possible for a visit in 2 days As needed, If symptoms worsen [address] [place] [address]  [phone]            Current Discharge Medication List      START taking these medications    Details   acetaminophen (TYLENOL) 500 mg tablet Take 2 Tabs by mouth every six (6) hours as needed for Pain. Qty: 20 Tab, Refills: 0      loratadine-pseudoephedrine (CLARITIN-D 12 HOUR) 5-120 mg per tablet Take 1 Tab by mouth two (2) times a day. Qty: 30 Tab, Refills: 0      ibuprofen (MOTRIN) 800 mg tablet Take 1 Tab by mouth every six (6) hours as needed for Pain for up to 7 days. Qty: 20 Tab, Refills: 0         CONTINUE these medications which have NOT CHANGED    Details   trimethoprim-sulfamethoxazole (BACTRIM DS) 160-800 mg per tablet Take 1 Tab by mouth two (2) times a day for 10 days.   Qty: 20 Tab, Refills: 0      cephALEXin (KEFLEX) 500 mg capsule Take 1 Cap by mouth three (3) times daily for 10 days. Qty: 30 Cap, Refills: 0      PSEUDOEPHEDRINE-guaiFENesin (MUCINEX D)  mg per tablet Take 1 Tab by mouth every twelve (12) hours. loratadine (CLARITIN) 10 mg tablet Take 1 Tab by mouth daily. Qty: 10 Tab, Refills: 0             Provider Notes (Medical Decision Making):   Patient presents with flu-like symptoms including upper respiratory symptoms, myalgias, cough. DDx: Influenza, URI, PNA, sinusitis, electrolyte abnormalities. Patient's vitals are stable as well as exam.  Plan to discharge home with symptomatic treatments including analgesics, fever reducer, decongestants. Patient to continue taking antibiotics for neck abscess which has improved. Patient is outside the window for use of Tamiflu. Patient agrees with care plan. Procedures:  Procedures        Diagnosis     Clinical Impression:   1.  Flu-like symptoms

## 2019-03-21 NOTE — ED TRIAGE NOTES
C/o prod cough with nasal/head congestion and generalized body aches/chills x 3-4 days, concerned for the flu

## 2019-03-21 NOTE — LETTER
[facility] EMERGENCY DEPT 
[address] [place] [address] [address] [address] 
[phone] Work/School Note Date: 3/21/2019 To Whom It May concern: 
 
Theodore Coto was seen and treated today in the emergency room by the following provider(s): 
Attending Provider: Miya Lee MD 
Physician Assistant: Grace Paulino PA-C. Theodore Coto may return to work on 3/23/2019. Sincerely, Kemi Escobar PA-C

## 2019-07-11 ENCOUNTER — HOSPITAL ENCOUNTER (EMERGENCY)
Age: 59
Discharge: HOME | End: 2019-07-11
Attending: EMERGENCY MEDICINE
Payer: COMMERCIAL

## 2019-07-11 VITALS
HEIGHT: 72 IN | RESPIRATION RATE: 18 BRPM | WEIGHT: 230 LBS | OXYGEN SATURATION: 98 % | HEART RATE: 94 BPM | SYSTOLIC BLOOD PRESSURE: 150 MMHG | DIASTOLIC BLOOD PRESSURE: 90 MMHG | BODY MASS INDEX: 31.15 KG/M2 | TEMPERATURE: 98.3 F

## 2019-07-11 DIAGNOSIS — J06.9 VIRAL UPPER RESPIRATORY TRACT INFECTION: ICD-10-CM

## 2019-07-11 DIAGNOSIS — J34.89 SINUS PRESSURE: Primary | ICD-10-CM

## 2019-07-11 PROCEDURE — 99282 EMERGENCY DEPT VISIT SF MDM: CPT

## 2019-07-11 RX ORDER — PSEUDOEPHEDRINE HCL 120 MG/1
120 TABLET, FILM COATED, EXTENDED RELEASE ORAL
Qty: 14 TAB | Refills: 0 | Status: SHIPPED | OUTPATIENT
Start: 2019-07-11 | End: 2019-07-18

## 2019-07-11 RX ORDER — FLUTICASONE PROPIONATE 50 MCG
2 SPRAY, SUSPENSION (ML) NASAL DAILY
Qty: 1 BOTTLE | Refills: 0 | Status: SHIPPED | OUTPATIENT
Start: 2019-07-11 | End: 2019-08-21

## 2019-07-11 NOTE — LETTER
[facility] EMERGENCY DEPT 
[address] [facility] [address] [address] 
[phone] Work/School Note Date: 7/11/2019 To Whom It May concern: 
 
Shelbi Trejo was seen and treated today in the emergency room by the following provider(s): 
Attending Provider: Cesar Bahena MD 
Physician Assistant: CRISTAL Agarwal. Shelbi Trejo may return to work on 7/12/19. Sincerely, CRISTAL White

## 2019-07-11 NOTE — ED PROVIDER NOTES
EMERGENCY DEPARTMENT HISTORY AND PHYSICAL EXAM      Date: 7/11/2019  Patient Name: Susanna Tilley    History of Presenting Illness     Please note that this dictation was completed with [id number], the computer voice recognition software. Quite often unanticipated grammatical, syntax, homophones, and other interpretive errors are inadvertently transcribed by the computer software. Please disregard these errors. Please excuse any errors that have escaped final proofreading. Chief Complaint   Patient presents with    Sore Throat    Headache       History Provided By: Patient and Patient's Wife    HPI: Susanna Tilley, 64 y.o. male with PMHx  for GERD, presents to the ED with cc of sore throat, sinus pressure, and congestion for 3 days. Pt states his wife had similar symptoms last week. Pt denies any trouble swallowing, drooling,  fevers, chills, nausea, vomiting, chest pain, shortness of breath, headache, rash, diarrhea, sweating or weight loss. All other ROS negative at this time  Pt is in no acute distress and is speaking in full sentences      There are no other complaints, changes, or physical findings at this time. Social History     Tobacco Use    Smoking status: Never Smoker    Smokeless tobacco: Never Used   Substance Use Topics    Alcohol use: Yes    Drug use: No     Types: Marijuana       No Known Allergies      PCP: Nader Kingsley MD    No current facility-administered medications on file prior to encounter. Current Outpatient Medications on File Prior to Encounter   Medication Sig Dispense Refill    acetaminophen (TYLENOL) 500 mg tablet Take 2 Tabs by mouth every six (6) hours as needed for Pain.  20 Tab 0       Past History     Past Medical History:  Past Medical History:   Diagnosis Date    Gastrointestinal disorder     GERDS    GERD (gastroesophageal reflux disease)        Past Surgical History:  Past Surgical History:   Procedure Laterality Date    HX APPENDECTOMY      HX HEENT      right eye surgery    HX OTHER SURGICAL      right eye surgery    HX UROLOGICAL      laser surgery on \"my private parts\"       Family History:  History reviewed. No pertinent family history. Social History:  Social History     Tobacco Use    Smoking status: Never Smoker    Smokeless tobacco: Never Used   Substance Use Topics    Alcohol use: Yes    Drug use: No     Types: Marijuana       Allergies:  No Known Allergies      Review of Systems   Review of Systems   Constitutional: Negative. Negative for chills and fever. HENT: Positive for congestion, rhinorrhea, sinus pressure and sore throat. Negative for drooling, trouble swallowing and voice change. Eyes: Negative. Respiratory: Negative. Negative for shortness of breath. Cardiovascular: Negative. Negative for chest pain. Gastrointestinal: Negative. Negative for abdominal pain, diarrhea, nausea and vomiting. Endocrine: Negative. Genitourinary: Negative. Musculoskeletal: Negative. Skin: Negative. Allergic/Immunologic: Negative. Neurological: Negative. Negative for headaches. Hematological: Negative. Psychiatric/Behavioral: Negative. All other systems reviewed and are negative. Physical Exam   Physical Exam   Constitutional: He is oriented to person, place, and time. He appears well-developed and well-nourished. No distress. HENT:   Head: Normocephalic and atraumatic. Right Ear: External ear normal.   Left Ear: External ear normal.   Nose: No rhinorrhea. Right sinus exhibits maxillary sinus tenderness. Right sinus exhibits no frontal sinus tenderness. Left sinus exhibits maxillary sinus tenderness. Left sinus exhibits no frontal sinus tenderness. Mouth/Throat: Uvula is midline and mucous membranes are normal. Posterior oropharyngeal erythema present. No oropharyngeal exudate, posterior oropharyngeal edema or tonsillar abscesses. Eyes: Pupils are equal, round, and reactive to light.  Conjunctivae and EOM are normal.   Neck: Normal range of motion. Neck supple. Cardiovascular: Normal rate, regular rhythm, normal heart sounds and intact distal pulses. Pulmonary/Chest: Effort normal and breath sounds normal. No respiratory distress. He has no wheezes. He has no rales. Abdominal: Soft. Bowel sounds are normal. He exhibits no distension. There is no tenderness. There is no rebound, no guarding, no CVA tenderness, no tenderness at McBurney's point and negative Gipson's sign. Musculoskeletal: Normal range of motion. Lymphadenopathy:     He has no cervical adenopathy. Neurological: He is alert and oriented to person, place, and time. He has normal reflexes. Skin: No rash noted. He is not diaphoretic. Psychiatric: He has a normal mood and affect. His behavior is normal. Judgment and thought content normal.   Nursing note and vitals reviewed. Diagnostic Study Results     Labs -   No results found for this or any previous visit (from the past 12 hour(s)). Radiologic Studies -   No orders to display     CT Results  (Last 48 hours)    None        CXR Results  (Last 48 hours)    None            Medical Decision Making   I am the first provider for this patient. I reviewed the vital signs, available nursing notes, past medical history, past surgical history, family history and social history. Vital Signs-Reviewed the patient's vital signs.   Patient Vitals for the past 12 hrs:   Temp Pulse Resp BP SpO2   07/11/19 1411 -- -- -- 150/90 --   07/11/19 1350 98.3 °F (36.8 °C) 94 18 (!) 152/104 98 %         Records Reviewed: Nursing Notes, Old Medical Records, Previous Radiology Studies and Previous Laboratory Studies    Provider Notes (Medical Decision Making):   Ddx: uri, bacterial vs viral sinusitis, pharyngitis, strep,    Worsening si/sxs discussed extensively   Follow up with PCP or RTC if symptoms/signs worsen  Side effects of medication discussed  Education materials provided at discharge   Pt verbalizes agreement with plan      ED Course:   Initial assessment performed. The patients presenting problems have been discussed, and they are in agreement with the care plan formulated and outlined with them. I have encouraged them to ask questions as they arise throughout their visit. Disposition:  Discharge     Care plan outlined and precautions discussed. Patient has no new complaints, changes, or physical findings. Results of visit were reviewed with the patient. All medications were reviewed with the patient; will d/c home. All of pt's questions and concerns were addressed. Patient was instructed and agrees to follow up with pcp, as well as to return to the ED upon further deterioration. Patient is ready to go home. Diagnosis     Clinical Impression:   1. Sinus pressure    2.  Viral upper respiratory tract infection

## 2019-08-21 ENCOUNTER — HOSPITAL ENCOUNTER (EMERGENCY)
Age: 59
Discharge: HOME | End: 2019-08-21
Attending: EMERGENCY MEDICINE | Admitting: EMERGENCY MEDICINE
Payer: COMMERCIAL

## 2019-08-21 VITALS
BODY MASS INDEX: 32.25 KG/M2 | HEART RATE: 90 BPM | OXYGEN SATURATION: 99 % | RESPIRATION RATE: 18 BRPM | SYSTOLIC BLOOD PRESSURE: 139 MMHG | TEMPERATURE: 98 F | HEIGHT: 72 IN | WEIGHT: 238.1 LBS | DIASTOLIC BLOOD PRESSURE: 83 MMHG

## 2019-08-21 DIAGNOSIS — L02.01 ABSCESS OF EYEBROW: Primary | ICD-10-CM

## 2019-08-21 PROCEDURE — 99282 EMERGENCY DEPT VISIT SF MDM: CPT

## 2019-08-21 RX ORDER — SULFAMETHOXAZOLE AND TRIMETHOPRIM 800; 160 MG/1; MG/1
1 TABLET ORAL 2 TIMES DAILY
Qty: 14 TAB | Refills: 0 | Status: SHIPPED | OUTPATIENT
Start: 2019-08-21 | End: 2019-08-28

## 2019-08-21 RX ORDER — NAPROXEN 250 MG/1
500 TABLET ORAL
Status: DISCONTINUED | OUTPATIENT
Start: 2019-08-21 | End: 2019-08-21 | Stop reason: HOSPADM

## 2019-08-21 RX ORDER — NAPROXEN 500 MG/1
500 TABLET ORAL 2 TIMES DAILY WITH MEALS
Qty: 20 TAB | Refills: 0 | OUTPATIENT
Start: 2019-08-21 | End: 2019-11-06

## 2019-08-21 NOTE — ED NOTES
Pt given printed discharge instructions and 2 script(s). Pt verbalized understanding of instructions and script(s). Pt verbalized importance of following up with PCP. Pt alert and oriented, in no acute distress, ambulatory with wife. Pt given work note.

## 2019-08-21 NOTE — DISCHARGE INSTRUCTIONS

## 2019-08-21 NOTE — LETTER
[facility] EMERGENCY DEPT 
[address] [address] 
[phone] Work/School Note Date: 8/21/2019 To Whom It May concern: 
 
Foreign Rasmussen was seen and treated today in the emergency room by the following provider(s): 
Attending Provider: Bryson Arrednodo MD 
Physician Assistant: CRISTAL Ayala. Foreign Rasmussen may return to work on 8/23/2019. Sincerely, Elvis Ormond, PA

## 2019-08-21 NOTE — ED PROVIDER NOTES
EMERGENCY DEPARTMENT HISTORY AND PHYSICAL EXAM      Date: 8/21/2019  Patient Name: Foreign Rasmussen    History of Presenting Illness     Chief Complaint   Patient presents with    Eyelid Inflammation     R       History Provided By: Patient    HPI: Foreign Rasmussen, 64 y.o. male with PMHx significant for GERD, presents ambulatory to the ED with cc of right eyelid and under eyelid pain and swelling x 3 days. Denies fever/chills, drainage. Denies known trauma/injury. Denies contact lens use. Rates pain 7/10. Pt has not taken anything for sx. Pain worse with palpation. There are no other complaints, changes, or physical findings at this time. PCP: Marian Browning MD    No current facility-administered medications on file prior to encounter. No current outpatient medications on file prior to encounter. Past History     Past Medical History:  Past Medical History:   Diagnosis Date    Gastrointestinal disorder     GERDS    GERD (gastroesophageal reflux disease)        Past Surgical History:  Past Surgical History:   Procedure Laterality Date    HX APPENDECTOMY      HX HEENT      right eye surgery    HX OTHER SURGICAL      right eye surgery    HX UROLOGICAL      laser surgery on \"my private parts\"       Family History:  History reviewed. No pertinent family history. Social History:  Social History     Tobacco Use    Smoking status: Never Smoker    Smokeless tobacco: Never Used   Substance Use Topics    Alcohol use: Yes    Drug use: No     Types: Marijuana       Allergies:  No Known Allergies      Review of Systems   Review of Systems   Constitutional: Negative for chills and fever. HENT: Negative for facial swelling. Eyes: Negative for photophobia and visual disturbance. Right eyelid and under eye swelling, erythema   Respiratory: Negative for shortness of breath. Cardiovascular: Negative for chest pain. Gastrointestinal: Negative for abdominal pain, nausea and vomiting. Genitourinary: Negative for flank pain. Skin: Negative for color change, pallor, rash and wound. Neurological: Negative for dizziness, weakness, light-headedness and headaches. All other systems reviewed and are negative. Physical Exam   Physical Exam   Constitutional: He is oriented to person, place, and time. He appears well-developed and well-nourished. No distress. HENT:   Head: Normocephalic and atraumatic. Eyes: Pupils are equal, round, and reactive to light. Conjunctivae and EOM are normal.       EOM intact  PERRL   Cardiovascular: Normal rate, regular rhythm and normal heart sounds. Pulmonary/Chest: Effort normal and breath sounds normal. No respiratory distress. Abdominal: Soft. Bowel sounds are normal. There is no tenderness. Musculoskeletal: Normal range of motion. Neurological: He is alert and oriented to person, place, and time. No cranial nerve deficit. Skin: Skin is warm. No rash noted. Psychiatric: He has a normal mood and affect. His behavior is normal.   Nursing note and vitals reviewed. Diagnostic Study Results     Labs -   No results found for this or any previous visit (from the past 12 hour(s)). Radiologic Studies -   No orders to display     CT Results  (Last 48 hours)    None        CXR Results  (Last 48 hours)    None            Medical Decision Making   I am the first provider for this patient. I reviewed the vital signs, available nursing notes, past medical history, past surgical history, family history and social history. Vital Signs-Reviewed the patient's vital signs. Patient Vitals for the past 12 hrs:   Temp Pulse Resp BP SpO2   08/21/19 1722 98 °F (36.7 °C) 90 18 139/83 99 %         Records Reviewed: Nursing Notes and Old Medical Records    Provider Notes (Medical Decision Making):   DDx: Abscess, Cellulitis, Blepharitis, Conjunctivitis, Preseptal vs orbital cellulitis    ED Course:   Initial assessment performed.  The patients presenting problems have been discussed, and they are in agreement with the care plan formulated and outlined with them. I have encouraged them to ask questions as they arise throughout their visit. Disposition:  5:45 PM  Discussed dx and treatment plan. Discussed importance of PCP follow up. All questions answered. Pt voiced they understood. Return if sx worsen. PLAN:  1. Current Discharge Medication List      START taking these medications    Details   trimethoprim-sulfamethoxazole (BACTRIM DS) 160-800 mg per tablet Take 1 Tab by mouth two (2) times a day for 7 days. Qty: 14 Tab, Refills: 0      naproxen (NAPROSYN) 500 mg tablet Take 1 Tab by mouth two (2) times daily (with meals). Qty: 20 Tab, Refills: 0           2. Follow-up Information     Follow up With Specialties Details Why Contact Info    Sarah Mario MD [facility] Schedule an appointment as soon as possible for a visit in 1 day  [address] [place] [address]  [phone]          Return to ED if worse     Diagnosis     Clinical Impression:   1.  Abscess of eyebrow

## 2019-08-23 ENCOUNTER — HOSPITAL ENCOUNTER (EMERGENCY)
Age: 59
Discharge: HOME | End: 2019-08-23
Attending: EMERGENCY MEDICINE
Payer: COMMERCIAL

## 2019-08-23 VITALS
HEART RATE: 78 BPM | SYSTOLIC BLOOD PRESSURE: 155 MMHG | RESPIRATION RATE: 15 BRPM | TEMPERATURE: 97.9 F | HEIGHT: 72 IN | WEIGHT: 238.1 LBS | OXYGEN SATURATION: 96 % | BODY MASS INDEX: 32.25 KG/M2 | DIASTOLIC BLOOD PRESSURE: 97 MMHG

## 2019-08-23 DIAGNOSIS — Z02.89 ENCOUNTER TO OBTAIN EXCUSE FROM WORK: Primary | ICD-10-CM

## 2019-08-23 PROCEDURE — 75810000275 HC EMERGENCY DEPT VISIT NO LEVEL OF CARE

## 2019-08-23 NOTE — ED PROVIDER NOTES
EMERGENCY DEPARTMENT HISTORY AND PHYSICAL EXAM      Date: 8/23/2019  Patient Name: Dionisio Duke    History of Presenting Illness     Chief Complaint   Patient presents with    Letter for School/Work     seen here yesterday, unable to return to work today       History Provided By: Patient    HPI: Dionisio Duke, 64 y.o. male with PMHx significant for GERD, presents ambulatory to the ED for work note. Pt states he was seen in ED two days ago for R eye abscess but works around a lot of dust so he does not want to go to work and further irritate his eye. He states he has been taking the abx as prescribed and using warm compresses. He reports drainage from abscess. He notes associated mild pain. Pt specifically denies any fever, chills, vision changes. There are no other complaints, changes, or physical findings at this time. PCP: America Abad MD    No current facility-administered medications on file prior to encounter. Current Outpatient Medications on File Prior to Encounter   Medication Sig Dispense Refill    trimethoprim-sulfamethoxazole (BACTRIM DS) 160-800 mg per tablet Take 1 Tab by mouth two (2) times a day for 7 days. 14 Tab 0    naproxen (NAPROSYN) 500 mg tablet Take 1 Tab by mouth two (2) times daily (with meals). 20 Tab 0       Past History     Past Medical History:  Past Medical History:   Diagnosis Date    Gastrointestinal disorder     GERDS    GERD (gastroesophageal reflux disease)        Past Surgical History:  Past Surgical History:   Procedure Laterality Date    HX APPENDECTOMY      HX HEENT      right eye surgery    HX OTHER SURGICAL      right eye surgery    HX UROLOGICAL      laser surgery on \"my private parts\"       Family History:  History reviewed. No pertinent family history. Social History:  Social History     Tobacco Use    Smoking status: Never Smoker    Smokeless tobacco: Never Used   Substance Use Topics    Alcohol use:  Yes    Drug use: No     Types: Marijuana       Allergies:  No Known Allergies      Review of Systems   Review of Systems   Constitutional: Negative for fever. HENT: Negative for sore throat and trouble swallowing.         + R eye abscess   Eyes: Negative for photophobia and redness. Respiratory: Negative for cough and shortness of breath. Cardiovascular: Negative for chest pain and leg swelling. Gastrointestinal: Negative for abdominal pain, constipation, diarrhea, nausea and vomiting. Endocrine: Negative for polydipsia and polyuria. Genitourinary: Negative for dysuria, hematuria and scrotal swelling. Musculoskeletal: Negative for back pain and joint swelling. Skin: Negative for rash. Neurological: Negative for dizziness, syncope, weakness and headaches. Psychiatric/Behavioral: Negative for suicidal ideas. All other systems reviewed and are negative. Physical Exam   Physical Exam   Constitutional: He is oriented to person, place, and time. He appears well-developed and well-nourished. No distress. HENT:   Head: Normocephalic and atraumatic. Mouth/Throat: Oropharynx is clear and moist. No oropharyngeal exudate. Eyes: Pupils are equal, round, and reactive to light. Conjunctivae and EOM are normal. Left eye exhibits no discharge. R eye abscess, spontaneously draining, minimal erythema   Neck: Normal range of motion. Neck supple. No JVD present. Cardiovascular: Normal rate, regular rhythm, normal heart sounds and intact distal pulses. Pulmonary/Chest: Effort normal and breath sounds normal. No respiratory distress. He has no wheezes. Abdominal: Soft. Bowel sounds are normal. He exhibits no distension. There is no tenderness. There is no rebound and no guarding. Musculoskeletal: Normal range of motion. He exhibits no edema or tenderness. Lymphadenopathy:     He has no cervical adenopathy. Neurological: He is alert and oriented to person, place, and time. He has normal reflexes. No cranial nerve deficit. Skin: Skin is warm and dry. No rash noted. Psychiatric: He has a normal mood and affect. His behavior is normal.   Nursing note and vitals reviewed. Medical Decision Making   I am the first provider for this patient. I reviewed the vital signs, available nursing notes, past medical history, past surgical history, family history and social history. Vital Signs-Reviewed the patient's vital signs. Patient Vitals for the past 12 hrs:   Temp Pulse Resp BP SpO2   08/23/19 0757 97.9 °F (36.6 °C) 78 15 (!) 155/97 96 %         Records Reviewed: Nursing Notes and Old Medical Records    Provider Notes (Medical Decision Making):   Here for work note    ED Course:   Initial assessment performed. The patients presenting problems have been discussed, and they are in agreement with the care plan formulated and outlined with them. I have encouraged them to ask questions as they arise throughout their visit. Critical Care Time:   none    Disposition:  DISCHARGE  8:06 AM  The patient has been re-evaluated and is ready for discharge. Reviewed available results with patient. Counseled pt on diagnosis and care plan. Pt has expressed understanding, and all questions have been answered. Pt agrees with plan and agrees to follow up as recommended, or return to the ED if their symptoms worsen. Discharge instructions have been provided and explained to the pt, along with reasons to return to the ED. PLAN:  1. Current Discharge Medication List        2. Follow-up Information     Follow up With Specialties Details Why [address] [facility] EMERGENCY DEPT Emergency Medicine In 3 days  [facility]  [phone]        Return to ED if worse     Diagnosis     Clinical Impression:   1. Encounter to obtain excuse from work        Attestations:   This note is prepared by Junaid Schneider, acting as Scribe for Milton Cardona MD.    Milton Cradona MD: The scribe's documentation has been prepared under my direction and personally reviewed by me in its entirety. I confirm that the note above accurately reflects all work, treatment, procedures, and medical decision making performed by me.

## 2019-08-23 NOTE — ED NOTES
Pt here for re-evaluation of rt eye abscess. Pt is A+Ox3 clear speaking. Emergency Department Nursing Plan of Care       The Nursing Plan of Care is developed from the Nursing assessment and Emergency Department Attending provider initial evaluation. The plan of care may be reviewed in the ED Provider note.     The Plan of Care was developed with the following considerations:   Patient / Family readiness to learn indicated by:verbalized understanding  Persons(s) to be included in education: patient  Barriers to Learning/Limitations:No    Signed     Kassandra Davila RN    8/23/2019   8:08 AM

## 2019-08-23 NOTE — ED NOTES
Pt for DC home. Patient (s)  given copy of dc instructions and 0 script(s). Patient (s)  verbalized understanding of instructions and script (s). Patient given a current medication reconciliation form and verbalized understanding of their medications. Patient (s)verbalized understanding of the importance of discussing medications with  his or her physician or clinic they will be following up with. Patient alert and oriented and in no acute distress. Patient discharged home ambulatory with self.

## 2019-08-23 NOTE — LETTER
[facility] EMERGENCY DEPT 
[address] [address] 
[phone] Work/School Note Date: 8/23/2019 To Whom It May concern: 
 
Talya Saba was seen and treated today in the emergency room by the following provider(s): 
Attending Provider: Charisma Landaverde MD. Talya Saba may return to work on 8/26/2019. Sincerely, David Fletcher MD

## 2019-11-06 ENCOUNTER — HOSPITAL ENCOUNTER (EMERGENCY)
Age: 59
Discharge: HOME | End: 2019-11-06
Attending: EMERGENCY MEDICINE
Payer: COMMERCIAL

## 2019-11-06 VITALS
SYSTOLIC BLOOD PRESSURE: 152 MMHG | DIASTOLIC BLOOD PRESSURE: 100 MMHG | RESPIRATION RATE: 18 BRPM | HEART RATE: 86 BPM | WEIGHT: 249.31 LBS | TEMPERATURE: 97.9 F | HEIGHT: 72 IN | BODY MASS INDEX: 33.77 KG/M2 | OXYGEN SATURATION: 96 %

## 2019-11-06 DIAGNOSIS — B34.9 VIRAL SYNDROME: ICD-10-CM

## 2019-11-06 DIAGNOSIS — J01.10 ACUTE FRONTAL SINUSITIS, RECURRENCE NOT SPECIFIED: Primary | ICD-10-CM

## 2019-11-06 PROCEDURE — 74011250637 HC RX REV CODE- 250/637: Performed by: NURSE PRACTITIONER

## 2019-11-06 PROCEDURE — 99283 EMERGENCY DEPT VISIT LOW MDM: CPT

## 2019-11-06 RX ORDER — GUAIFENESIN 600 MG/1
600 TABLET, EXTENDED RELEASE ORAL 2 TIMES DAILY
Qty: 14 TAB | Refills: 0 | Status: SHIPPED | OUTPATIENT
Start: 2019-11-06 | End: 2020-01-13

## 2019-11-06 RX ORDER — IBUPROFEN 400 MG/1
800 TABLET ORAL
Status: COMPLETED | OUTPATIENT
Start: 2019-11-06 | End: 2019-11-06

## 2019-11-06 RX ORDER — FLUTICASONE PROPIONATE 50 MCG
2 SPRAY, SUSPENSION (ML) NASAL DAILY
Qty: 1 BOTTLE | Refills: 0 | Status: SHIPPED | OUTPATIENT
Start: 2019-11-06 | End: 2020-01-13

## 2019-11-06 RX ORDER — AMOXICILLIN AND CLAVULANATE POTASSIUM 875; 125 MG/1; MG/1
1 TABLET, FILM COATED ORAL 2 TIMES DAILY
Qty: 14 TAB | Refills: 0 | Status: SHIPPED | OUTPATIENT
Start: 2019-11-06 | End: 2020-01-13

## 2019-11-06 RX ADMIN — IBUPROFEN 800 MG: 400 TABLET, FILM COATED ORAL at 12:18

## 2019-11-06 NOTE — LETTER
[facility] EMERGENCY DEPT 
[address] [address] 
[phone] Work/School Note Date: 11/6/2019 To Whom It May concern: 
 
Baron Bernard was seen and treated today in the emergency room by the following provider(s): 
Attending Provider: Judy Hines MD 
Nurse Practitioner: Jc Sanchez NP. Baron Bernard may return to work on 11/9/19. Sincerely, Roxy Peraza NP

## 2019-11-06 NOTE — ED TRIAGE NOTES
Pt presents with headache and diarrhea since Monday. Pt denies HTN, but his wife checks BP at home and it has been high.

## 2019-11-06 NOTE — ED PROVIDER NOTES
EMERGENCY DEPARTMENT HISTORY AND PHYSICAL EXAM    Date: 11/6/2019  Patient Name: Priscilla Flores    History of Presenting Illness     Chief Complaint   Patient presents with    Diarrhea    Headache         History Provided By: Patient    HPI: Priscilla Flores is a 64 y.o. male with a PMH of GERD  who presents with headache and diarrhea. Onset 2 days ago. Reports diarrhea is improving. No abd pain, N/V, fever or chills. Also reports frontal h/a. No association with vision changes or dizziness. Reports congestion. States he works on [employer] and reports exposure to extreme weather conditions . Has not tried anything for sx. PCP: Pancho Rodriguez MD    Current Outpatient Medications   Medication Sig Dispense Refill    amoxicillin-clavulanate (AUGMENTIN) 875-125 mg per tablet Take 1 Tab by mouth two (2) times a day. 14 Tab 0    fluticasone propionate (FLONASE) 50 mcg/actuation nasal spray 2 Sprays by Both Nostrils route daily. 1 Bottle 0    guaiFENesin ER (MUCINEX) 600 mg ER tablet Take 1 Tab by mouth two (2) times a day. 14 Tab 0       Past History     Past Medical History:  Past Medical History:   Diagnosis Date    Gastrointestinal disorder     GERDS    GERD (gastroesophageal reflux disease)        Past Surgical History:  Past Surgical History:   Procedure Laterality Date    HX APPENDECTOMY      HX HEENT      right eye surgery    HX OTHER SURGICAL      right eye surgery    HX UROLOGICAL      laser surgery on \"my private parts\"       Family History:  History reviewed. No pertinent family history. Social History:  Social History     Tobacco Use    Smoking status: Never Smoker    Smokeless tobacco: Never Used   Substance Use Topics    Alcohol use: Yes     Comment: wine coolers    Drug use: No     Types: Marijuana       Allergies:  No Known Allergies      Review of Systems   Review of Systems   Constitutional: Negative for chills and fever. HENT: Positive for congestion.  Negative for rhinorrhea and sore throat. Respiratory: Negative for cough and shortness of breath. Cardiovascular: Negative for chest pain and leg swelling. Gastrointestinal: Positive for diarrhea. Negative for abdominal pain, constipation, nausea and vomiting. Genitourinary: Negative for dysuria, frequency and urgency. Musculoskeletal: Positive for arthralgias. Negative for neck pain. Skin: Negative for wound. Neurological: Positive for headaches. Negative for dizziness and numbness. All other systems reviewed and are negative. Physical Exam     Vitals:    11/06/19 1124 11/06/19 1127 11/06/19 1200   BP: (!) 162/105 (!) 149/106 (!) 152/100   Pulse: 86     Resp: 18     Temp: 97.9 °F (36.6 °C)     SpO2: 98%  96%   Weight: 113.1 kg (249 lb 5 oz)     Height: 6' (1.829 m)       Physical Exam   Constitutional: He is oriented to person, place, and time. He appears well-developed and well-nourished. No distress. HENT:   Head: Normocephalic and atraumatic. Right Ear: Tympanic membrane, external ear and ear canal normal.   Left Ear: External ear normal. Tympanic membrane is erythematous and bulging. Nose: Mucosal edema (Bilat edema with R nare exhibiting yellow nasal drainage ) present. Right sinus exhibits frontal sinus tenderness. Right sinus exhibits no maxillary sinus tenderness. Left sinus exhibits frontal sinus tenderness. Left sinus exhibits no maxillary sinus tenderness. Mouth/Throat: Uvula is midline, oropharynx is clear and moist and mucous membranes are normal.   Eyes: Pupils are equal, round, and reactive to light. Conjunctivae and EOM are normal.   Neck: Normal range of motion. Neck supple. Cardiovascular: Normal rate, regular rhythm and normal heart sounds. Pulmonary/Chest: Effort normal and breath sounds normal.   Abdominal: Soft. Bowel sounds are normal. He exhibits no distension. There is no tenderness. Musculoskeletal: Normal range of motion. Lymphadenopathy:     He has no cervical adenopathy. Neurological: He is alert and oriented to person, place, and time. He has normal strength. No cranial nerve deficit. GCS eye subscore is 4. GCS verbal subscore is 5. GCS motor subscore is 6. Skin: Skin is warm and dry. Psychiatric: He has a normal mood and affect. Thought content normal.   Nursing note and vitals reviewed. Diagnostic Study Results     Labs -   No results found for this or any previous visit (from the past 12 hour(s)). Radiologic Studies -   No orders to display     CT Results  (Last 48 hours)    None        CXR Results  (Last 48 hours)    None            Medical Decision Making   I am the first provider for this patient. I reviewed the vital signs, available nursing notes, past medical history, past surgical history, family history and social history. Vital Signs-Reviewed the patient's vital signs. Records Reviewed: Nursing Notes and Old Medical Records            Disposition:  Disxharge    DISCHARGE NOTE:       Care plan outlined and precautions discussed. Patient has no new complaints, changes, or physical findings. All medications were reviewed with the patient; will d/c home with augmentin and flonase . All of pt's questions and concerns were addressed. Patient was instructed and agrees to follow up with PCP, as well as to return to the ED upon further deterioration. Patient is ready to go home. Follow-up Information     Follow up With Specialties Details Why Contact Info    An Robb MD Family Practice In 1 week If symptoms worsen [address] [place] [address]  [phone]            Discharge Medication List as of 11/6/2019 12:14 PM      START taking these medications    Details   amoxicillin-clavulanate (AUGMENTIN) 875-125 mg per tablet Take 1 Tab by mouth two (2) times a day., Normal, Disp-14 Tab, R-0      fluticasone propionate (FLONASE) 50 mcg/actuation nasal spray 2 Sprays by Both Nostrils route daily. , Normal, Disp-1 Bottle, R-0 guaiFENesin ER (MUCINEX) 600 mg ER tablet Take 1 Tab by mouth two (2) times a day., Normal, Disp-14 Tab, R-0         STOP taking these medications       naproxen (NAPROSYN) 500 mg tablet Comments:   Reason for Stopping:               Provider Notes (Medical Decision Making):   DDX: influenza, viral syndrome, viral vs bacterial sinusitis, URI  Procedures:  Procedures    Please note that this dictation was completed with Dragon, computer voice recognition software. Quite often unanticipated grammatical, syntax, homophones, and other interpretive errors are inadvertently transcribed by the computer software. Please disregard these errors. Additionally, please excuse any errors that have escaped final proofreading. Diagnosis     Clinical Impression:   1. Acute frontal sinusitis, recurrence not specified    2.  Viral syndrome

## 2019-11-06 NOTE — DISCHARGE INSTRUCTIONS
Patient Education        Sinusitis: Care Instructions  Your Care Instructions    Sinusitis is an infection of the lining of the sinus cavities in your head. Sinusitis often follows a cold. It causes pain and pressure in your head and face. In most cases, sinusitis gets better on its own in 1 to 2 weeks. But some mild symptoms may last for several weeks. Sometimes antibiotics are needed. Follow-up care is a key part of your treatment and safety. Be sure to make and go to all appointments, and call your doctor if you are having problems. It's also a good idea to know your test results and keep a list of the medicines you take. How can you care for yourself at home? · Take an over-the-counter pain medicine, such as acetaminophen (Tylenol), ibuprofen (Advil, Motrin), or naproxen (Aleve). Read and follow all instructions on the label. · If the doctor prescribed antibiotics, take them as directed. Do not stop taking them just because you feel better. You need to take the full course of antibiotics. · Be careful when taking over-the-counter cold or flu medicines and Tylenol at the same time. Many of these medicines have acetaminophen, which is Tylenol. Read the labels to make sure that you are not taking more than the recommended dose. Too much acetaminophen (Tylenol) can be harmful. · Breathe warm, moist air from a steamy shower, a hot bath, or a sink filled with hot water. Avoid cold, dry air. Using a humidifier in your home may help. Follow the directions for cleaning the machine. · Use saline (saltwater) nasal washes to help keep your nasal passages open and wash out mucus and bacteria. You can buy saline nose drops at a grocery store or drugstore. Or you can make your own at home by adding 1 teaspoon of salt and 1 teaspoon of baking soda to 2 cups of distilled water. If you make your own, fill a bulb syringe with the solution, insert the tip into your nostril, and squeeze gently. Rhiannon Outhouse your nose.   · Put a hot, wet towel or a warm gel pack on your face 3 or 4 times a day for 5 to 10 minutes each time. · Try a decongestant nasal spray like oxymetazoline (Afrin). Do not use it for more than 3 days in a row. Using it for more than 3 days can make your congestion worse. When should you call for help? Call your doctor now or seek immediate medical care if:    · You have new or worse swelling or redness in your face or around your eyes.     · You have a new or higher fever.    Watch closely for changes in your health, and be sure to contact your doctor if:    · You have new or worse facial pain.     · The mucus from your nose becomes thicker (like pus) or has new blood in it.     · You are not getting better as expected. Where can you learn more? Go to http://yolanda-edil.info/. Enter H834 in the search box to learn more about \"Sinusitis: Care Instructions. \"  Current as of: October 21, 2018  Content Version: 12.2  © 7513-3172 [employer]. Care instructions adapted under license by [employer] (which disclaims liability or warranty for this information). If you have questions about a medical condition or this instruction, always ask your healthcare professional. [address] any warranty or liability for your use of this information.

## 2019-11-06 NOTE — ED NOTES
Pt arrived to ED with c/o headache and diarrhea x Monday. Pt has taken BC's w/o relief. Pt denies hx of HTN but BP X 3 attempts have all been elevated. Pt is in no acute distress. Will continue to monitor. See nursing assessment. Safety precautions in place; call light within reach. Emergency Department Nursing Plan of Care       The Nursing Plan of Care is developed from the Nursing assessment and Emergency Department Attending provider initial evaluation. The plan of care may be reviewed in the ED Provider note.     The Plan of Care was developed with the following considerations:   Patient / Family readiness to learn indicated by:verbalized understanding  Persons(s) to be included in education: patient  Barriers to Learning/Limitations:No    Signed     Sherry Weeks RN    11/6/2019   11:35 AM

## 2020-01-13 ENCOUNTER — APPOINTMENT (OUTPATIENT)
Dept: GENERAL RADIOLOGY | Age: 60
End: 2020-01-13
Attending: PHYSICIAN ASSISTANT
Payer: COMMERCIAL

## 2020-01-13 ENCOUNTER — HOSPITAL ENCOUNTER (EMERGENCY)
Age: 60
Discharge: HOME | End: 2020-01-13
Attending: EMERGENCY MEDICINE
Payer: COMMERCIAL

## 2020-01-13 VITALS
HEIGHT: 72 IN | WEIGHT: 253.31 LBS | DIASTOLIC BLOOD PRESSURE: 83 MMHG | HEART RATE: 98 BPM | OXYGEN SATURATION: 99 % | BODY MASS INDEX: 34.31 KG/M2 | RESPIRATION RATE: 19 BRPM | SYSTOLIC BLOOD PRESSURE: 135 MMHG

## 2020-01-13 DIAGNOSIS — Z86.14 HISTORY OF MRSA INFECTION: ICD-10-CM

## 2020-01-13 DIAGNOSIS — L02.519 ABSCESS OF HAND: Primary | ICD-10-CM

## 2020-01-13 PROCEDURE — 87186 SC STD MICRODIL/AGAR DIL: CPT

## 2020-01-13 PROCEDURE — 87147 CULTURE TYPE IMMUNOLOGIC: CPT

## 2020-01-13 PROCEDURE — 73130 X-RAY EXAM OF HAND: CPT

## 2020-01-13 PROCEDURE — 87077 CULTURE AEROBIC IDENTIFY: CPT

## 2020-01-13 PROCEDURE — 87205 SMEAR GRAM STAIN: CPT

## 2020-01-13 PROCEDURE — 99283 EMERGENCY DEPT VISIT LOW MDM: CPT

## 2020-01-13 PROCEDURE — 74011250637 HC RX REV CODE- 250/637: Performed by: PHYSICIAN ASSISTANT

## 2020-01-13 PROCEDURE — 74011000250 HC RX REV CODE- 250: Performed by: PHYSICIAN ASSISTANT

## 2020-01-13 PROCEDURE — 75810000289 HC I&D ABSCESS SIMP/COMP/MULT

## 2020-01-13 RX ORDER — OXYCODONE AND ACETAMINOPHEN 5; 325 MG/1; MG/1
2 TABLET ORAL
Status: COMPLETED | OUTPATIENT
Start: 2020-01-13 | End: 2020-01-13

## 2020-01-13 RX ORDER — CEPHALEXIN 500 MG/1
500 CAPSULE ORAL 4 TIMES DAILY
Qty: 28 CAP | Refills: 0 | Status: SHIPPED | OUTPATIENT
Start: 2020-01-13 | End: 2020-01-20

## 2020-01-13 RX ORDER — LIDOCAINE HYDROCHLORIDE AND EPINEPHRINE 10; 10 MG/ML; UG/ML
5 INJECTION, SOLUTION INFILTRATION; PERINEURAL
Status: COMPLETED | OUTPATIENT
Start: 2020-01-13 | End: 2020-01-13

## 2020-01-13 RX ORDER — OXYCODONE AND ACETAMINOPHEN 7.5; 325 MG/1; MG/1
1 TABLET ORAL
Qty: 15 TAB | Refills: 0 | Status: SHIPPED | OUTPATIENT
Start: 2020-01-13 | End: 2020-01-18

## 2020-01-13 RX ORDER — SULFAMETHOXAZOLE AND TRIMETHOPRIM 800; 160 MG/1; MG/1
1 TABLET ORAL 2 TIMES DAILY
Qty: 14 TAB | Refills: 0 | Status: SHIPPED | OUTPATIENT
Start: 2020-01-13 | End: 2020-01-20

## 2020-01-13 RX ADMIN — OXYCODONE HYDROCHLORIDE AND ACETAMINOPHEN 2 TABLET: 5; 325 TABLET ORAL at 13:17

## 2020-01-13 RX ADMIN — LIDOCAINE HYDROCHLORIDE,EPINEPHRINE BITARTRATE 50 MG: 10; .01 INJECTION, SOLUTION INFILTRATION; PERINEURAL at 13:17

## 2020-01-13 NOTE — DISCHARGE INSTRUCTIONS

## 2020-01-13 NOTE — ED NOTES
Pt C/P abscess on PIP 5th digit of left hand. Onset was Wednesday. Hx MRSA. No open wound. Area is painful and hot to touch. Swelling throughout the hand, wrist, and up the forearm. Pt has wedding band on left 4th digit. Removed ring with assistance of lubricant. Cap refill is 3-4 seconds distally. No known injury. Pt is afebrile at this time. Emergency Department Nursing Plan of Care       The Nursing Plan of Care is developed from the Nursing assessment and Emergency Department Attending provider initial evaluation. The plan of care may be reviewed in the ED Provider note.     The Plan of Care was developed with the following considerations:   Patient / Family readiness to learn indicated by:verbalized understanding  Persons(s) to be included in education: patient  Barriers to Learning/Limitations:No    Signed     Sandor Scott RN    1/13/2020   1:02 PM

## 2020-01-13 NOTE — ED NOTES
Patient given copy of dc instructions and three script(s). Patient verbalized understanding of instructions and script(s). Patient given a current medication reconciliation form and verbalized understanding of their medications. Patient verbalized understanding of the importance of discussing medications with  his or her physician or clinic when they follow up. Patient alert and oriented and in no acute distress. Pt verbalizes pain scale of 3 out of 10. Patient discharged home without assistance. Wheelchair was declined.

## 2020-01-13 NOTE — ED PROVIDER NOTES
EMERGENCY DEPARTMENT HISTORY AND PHYSICAL EXAM      Date: 1/13/2020  Patient Name: Marino Cochran    Please note that this dictation was completed with [employer], the computer voice recognition software. Quite often unanticipated grammatical, syntax, homophones, and other interpretive errors are inadvertently transcribed by the computer software. Please disregard these errors. Please excuse any errors that have escaped final proofreading. History of Presenting Illness     Chief Complaint   Patient presents with    Abscess       History Provided By: Patient    HPI: Marino Cochran, 64 y.o. male with PMHx significant for MRSA, reflux, presents ambulatory to the ED with cc of left hand pain, swelling, warmth since Friday. Patient states that the symptoms have gradually increased since onset. He does report a history of MRSA and abscesses. He states that he has never had incision and drainage in the past.  He has not taken any medication prior to arrival.  He denies any drainage, fever, chills, nausea, vomiting. PCP: Indigo Kilgore MD    There are no other complaints, changes, or physical findings at this time. Current Outpatient Medications   Medication Sig Dispense Refill    cephALEXin (KEFLEX) 500 mg capsule Take 1 Cap by mouth four (4) times daily for 7 days. 28 Cap 0    trimethoprim-sulfamethoxazole (BACTRIM DS) 160-800 mg per tablet Take 1 Tab by mouth two (2) times a day for 7 days. 14 Tab 0    oxyCODONE-acetaminophen (PERCOCET) 7.5-325 mg per tablet Take 1 Tab by mouth every eight (8) hours as needed for Pain for up to 5 days. Max Daily Amount: 3 Tabs.  Indications: pain 15 Tab 0       Past History     Past Medical History:  Past Medical History:   Diagnosis Date    Gastrointestinal disorder     GERDS    GERD (gastroesophageal reflux disease)     Ill-defined condition     MRSA       Past Surgical History:  Past Surgical History:   Procedure Laterality Date    HX APPENDECTOMY      HX HEENT right eye surgery    HX OTHER SURGICAL      right eye surgery    HX UROLOGICAL      laser surgery on \"my private parts\"       Family History:  History reviewed. No pertinent family history. Social History:  Social History     Tobacco Use    Smoking status: Never Smoker    Smokeless tobacco: Never Used   Substance Use Topics    Alcohol use: Yes     Comment: wine coolers    Drug use: No     Types: Marijuana       Allergies:  No Known Allergies      Review of Systems   Review of Systems   Constitutional: Negative. Negative for chills and fever. Eyes: Negative for pain and visual disturbance. Respiratory: Negative for cough and shortness of breath. Cardiovascular: Negative for chest pain and palpitations. Gastrointestinal: Negative for nausea and vomiting. Musculoskeletal: Positive for arthralgias and joint swelling. Negative for myalgias. Skin: Positive for color change. Negative for rash and wound. Neurological: Negative for numbness and headaches. All other systems reviewed and are negative. Physical Exam   Physical Exam  Vitals signs and nursing note reviewed. Constitutional:       General: He is not in acute distress. Appearance: He is well-developed. He is not diaphoretic. Comments: 64 y.o. male in NAD  Communicates appropriately and in full sentences  Normal vital signs   HENT:      Head: Normocephalic and atraumatic. Eyes:      General:         Right eye: No discharge. Left eye: No discharge. Conjunctiva/sclera: Conjunctivae normal.      Pupils: Pupils are equal, round, and reactive to light. Neck:      Musculoskeletal: Normal range of motion and neck supple. Comments: No nuchal rigidity  Cardiovascular:      Rate and Rhythm: Normal rate and regular rhythm. Pulmonary:      Effort: Pulmonary effort is normal. No respiratory distress. Breath sounds: Normal breath sounds. No wheezing.    Abdominal:      General: Bowel sounds are normal. There is no distension. Palpations: Abdomen is soft. Tenderness: There is no tenderness. Musculoskeletal:         General: Swelling and tenderness (Diffusely to the left dorsum of the hand with moderate appreciable swelling. There is a 1-1/2 x 1/2 cm fluctuant abscess to the dorsal aspect of the medial hand over the fourth and fifth metacarpals.) present. Comments: No neurologic or vascular compromise on exam.    Skin:     General: Skin is warm and dry. Capillary Refill: Capillary refill takes less than 2 seconds. Coloration: Skin is not pale. Findings: Erythema present. No rash. Neurological:      Mental Status: He is alert and oriented to person, place, and time. Coordination: Coordination normal.                       Diagnostic Study Results     Labs -   No results found for this or any previous visit (from the past 12 hour(s)). Radiologic Studies -   XR HAND LT MIN 3 V   Final Result   IMPRESSION:    1. No acute osseous abnormality. Soft tissue swelling along the ulnar and dorsal   aspect of the hand. 2. 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 3. Incidental note of lunotriquetral coalition. CT Results  (Last 48 hours)    None        CXR Results  (Last 48 hours)    None            Medical Decision Making   I am the first provider for this patient. I reviewed the vital signs, available nursing notes, past medical history, past surgical history, family history and social history. Vital Signs-Reviewed the patient's vital signs. Patient Vitals for the past 12 hrs:   Pulse Resp BP SpO2   01/13/20 1239 98 19 135/83 99 %         Records Reviewed: Nursing Notes and Old Medical Records    Provider Notes (Medical Decision Making):   Differential diagnosis includes MRSA, abscess, cellulitis, tenosynovitis. 61-year-old male with past medical history of MRSA presents to the emergency department with complaints of an abscess to his left hand. Patient is afebrile. On exam, he has an erythematous and fluctuant abscess to the dorsal aspect of the left hand. Hand is significantly swollen compared to his right hand. Patient is right-hand dominant. Evaluated by attending at bedside as well. Blood work deferred. Will perform I&D, obtain x-ray, and discussed with hand surgery. Patient to follow-up with Dr. Lucia Root this upcoming Friday. To return to the emergency department on Wednesday for a wound check and packing removal.    ED Course:   Initial assessment performed. The patients presenting problems have been discussed, and they are in agreement with the care plan formulated and outlined with them. I have encouraged them to ask questions as they arise throughout their visit. Procedure Note - Incision and Drainage:   Performed by: Adam Hubbard PA-C   Complexity: complex  Skin prepped with Betadine. Sterile field established. Anesthesia achieved with 5 mLs of Lidocaine 2% with epinephrine using a local infiltration. Abscess to dorsum of left hand was incised with # 11 blade, and 3mLs of thick, malodorous, purulent drainage was expressed. Wound probed and irrigated. Area was packed using 1/4 inch iodoform gauze. Sterile dressing applied. Estimated blood loss: 1 to 2 mL  The procedure took 16-30 minutes, and pt tolerated well. CONSULT NOTE:  5:08 PM  Adam Hubbard PA-C spoke with Dr. Scott Obando. Specialty: Hand surgery  Discussed pt's hx, disposition, and available diagnostic and imaging results. Reviewed care plans. Consultant agrees with the plan as discussed. He will see the patient in office on Friday. Patient is to call the office today to schedule that appointment. No further testing suggested at this point time. DISCHARGE NOTE:  Wilfredo Perea  results have been reviewed with him. He has been counseled regarding his diagnosis.   He verbally conveys understanding and agreement of the signs, symptoms, diagnosis, treatment and prognosis and additionally agrees to follow up as recommended with Dr. Gayle Slade MD in 24 - 48 hours. He also agrees with the care-plan and conveys that all of his questions have been answered. I have also put together some discharge instructions for him that include: 1) educational information regarding their diagnosis, 2) how to care for their diagnosis at home, as well a 3) list of reasons why they would want to return to the ED prior to their follow-up appointment, should their condition change. He and/or family's questions have been answered. I have encouraged them to see the official results in [facility]\" or to retrieve the specifics of their results from medical records. PLAN:  1. Return precautions as discussed  2. Follow-up with providers as directed  3. Medications as prescribed    Return to ED if worse     Diagnosis     Clinical Impression:   1. Abscess of hand    2. History of MRSA infection        Discharge Medication List as of 1/13/2020  1:45 PM      START taking these medications    Details   cephALEXin (KEFLEX) 500 mg capsule Take 1 Cap by mouth four (4) times daily for 7 days. , Normal, Disp-28 Cap, R-0      trimethoprim-sulfamethoxazole (BACTRIM DS) 160-800 mg per tablet Take 1 Tab by mouth two (2) times a day for 7 days. , Normal, Disp-14 Tab, R-0      oxyCODONE-acetaminophen (PERCOCET) 7.5-325 mg per tablet Take 1 Tab by mouth every eight (8) hours as needed for Pain for up to 5 days. Max Daily Amount: 3 Tabs. Indications: pain, Print, Disp-15 Tab, R-0             Follow-up Information     Follow up With Specialties Details Why Contact Info    Gayle Slade MD [facility] Schedule an appointment as soon as possible for a visit in 2 days As needed, If symptoms worsen [address] [address] [address] Kathy Dougherty MD Hand Surgery, General Surgery Call today For wound re-check, To schedule an appointment as soon as possible.  He will see you in office on Friday but you must call to schedule the appointment. [id number] [facility] EMERGENCY DEPT Emergency Medicine Go in 2 days For wound re-check Erick Maxime  [phone]              This note will not be viewable in [address]

## 2020-01-13 NOTE — LETTER
[facility] EMERGENCY DEPT 
[address] [address] 
[phone] Work/School Note Date: 1/13/2020 To Whom It May concern: 
 
Leti Adhikari was seen and treated today in the emergency room by the following provider(s): 
Attending Provider: Dina Bernard MD 
Physician Assistant: CRISTAL Barragan. Leti Adhikari may return to work on 1/16/2020. Sincerely, 
 
 
 
 
Carola Plummer

## 2020-01-14 NOTE — ED NOTES
Wound culture follow-up growing MRSA. Patient on Bactrim. Treated appropriately. No changes at this time.

## 2020-01-15 LAB
BACTERIA SPEC CULT: ABNORMAL
GRAM STN SPEC: ABNORMAL
GRAM STN SPEC: ABNORMAL
SERVICE CMNT-IMP: ABNORMAL

## 2020-03-10 ENCOUNTER — HOSPITAL ENCOUNTER (EMERGENCY)
Age: 60
Discharge: HOME | End: 2020-03-10
Attending: EMERGENCY MEDICINE
Payer: COMMERCIAL

## 2020-03-10 VITALS
HEIGHT: 72 IN | RESPIRATION RATE: 18 BRPM | DIASTOLIC BLOOD PRESSURE: 109 MMHG | HEART RATE: 82 BPM | SYSTOLIC BLOOD PRESSURE: 151 MMHG | OXYGEN SATURATION: 98 % | BODY MASS INDEX: 33.18 KG/M2 | TEMPERATURE: 97.7 F | WEIGHT: 245 LBS

## 2020-03-10 DIAGNOSIS — A08.4 VIRAL GASTROENTERITIS: ICD-10-CM

## 2020-03-10 DIAGNOSIS — R10.9 ACUTE ABDOMINAL PAIN: Primary | ICD-10-CM

## 2020-03-10 DIAGNOSIS — R19.7 DIARRHEA, UNSPECIFIED TYPE: ICD-10-CM

## 2020-03-10 DIAGNOSIS — G44.209 ACUTE NON INTRACTABLE TENSION-TYPE HEADACHE: ICD-10-CM

## 2020-03-10 DIAGNOSIS — R11.0 NAUSEA WITHOUT VOMITING: ICD-10-CM

## 2020-03-10 LAB
ALBUMIN SERPL-MCNC: 4.2 G/DL (ref 3.5–5)
ALBUMIN/GLOB SERPL: 1 {RATIO} (ref 1.1–2.2)
ALP SERPL-CCNC: 59 U/L (ref 45–117)
ALT SERPL-CCNC: 51 U/L (ref 12–78)
ANION GAP SERPL CALC-SCNC: 10 MMOL/L (ref 5–15)
APPEARANCE UR: CLEAR
AST SERPL-CCNC: 39 U/L (ref 15–37)
BACTERIA URNS QL MICRO: NEGATIVE /HPF
BASOPHILS # BLD: 0 K/UL (ref 0–0.1)
BASOPHILS NFR BLD: 0 % (ref 0–1)
BILIRUB SERPL-MCNC: 0.7 MG/DL (ref 0.2–1)
BILIRUB UR QL: NEGATIVE
BUN SERPL-MCNC: 12 MG/DL (ref 6–20)
BUN/CREAT SERPL: 10 (ref 12–20)
CALCIUM SERPL-MCNC: 8.7 MG/DL (ref 8.5–10.1)
CHLORIDE SERPL-SCNC: 105 MMOL/L (ref 97–108)
CO2 SERPL-SCNC: 27 MMOL/L (ref 21–32)
COLOR UR: NORMAL
CREAT SERPL-MCNC: 1.23 MG/DL (ref 0.7–1.3)
DIFFERENTIAL METHOD BLD: ABNORMAL
EOSINOPHIL # BLD: 0.2 K/UL (ref 0–0.4)
EOSINOPHIL NFR BLD: 2 % (ref 0–7)
EPITH CASTS URNS QL MICRO: NORMAL /LPF
ERYTHROCYTE [DISTWIDTH] IN BLOOD BY AUTOMATED COUNT: 14.7 % (ref 11.5–14.5)
GLOBULIN SER CALC-MCNC: 4.2 G/DL (ref 2–4)
GLUCOSE SERPL-MCNC: 107 MG/DL (ref 65–100)
GLUCOSE UR STRIP.AUTO-MCNC: NEGATIVE MG/DL
HCT VFR BLD AUTO: 42.5 % (ref 36.6–50.3)
HGB BLD-MCNC: 15.4 G/DL (ref 12.1–17)
HGB UR QL STRIP: NEGATIVE
IMM GRANULOCYTES # BLD AUTO: 0 K/UL (ref 0–0.04)
IMM GRANULOCYTES NFR BLD AUTO: 1 % (ref 0–0.5)
KETONES UR QL STRIP.AUTO: NEGATIVE MG/DL
LEUKOCYTE ESTERASE UR QL STRIP.AUTO: NEGATIVE
LIPASE SERPL-CCNC: 155 U/L (ref 73–393)
LYMPHOCYTES # BLD: 3.1 K/UL (ref 0.8–3.5)
LYMPHOCYTES NFR BLD: 36 % (ref 12–49)
MCH RBC QN AUTO: 31.4 PG (ref 26–34)
MCHC RBC AUTO-ENTMCNC: 36.2 G/DL (ref 30–36.5)
MCV RBC AUTO: 86.7 FL (ref 80–99)
MONOCYTES # BLD: 0.7 K/UL (ref 0–1)
MONOCYTES NFR BLD: 8 % (ref 5–13)
NEUTS SEG # BLD: 4.5 K/UL (ref 1.8–8)
NEUTS SEG NFR BLD: 53 % (ref 32–75)
NITRITE UR QL STRIP.AUTO: NEGATIVE
NRBC # BLD: 0 K/UL (ref 0–0.01)
NRBC BLD-RTO: 0 PER 100 WBC
PH UR STRIP: 5.5 [PH] (ref 5–8)
PLATELET # BLD AUTO: 143 K/UL (ref 150–400)
PMV BLD AUTO: 11.6 FL (ref 8.9–12.9)
POTASSIUM SERPL-SCNC: 3.9 MMOL/L (ref 3.5–5.1)
PROT SERPL-MCNC: 8.4 G/DL (ref 6.4–8.2)
PROT UR STRIP-MCNC: NEGATIVE MG/DL
RBC # BLD AUTO: 4.9 M/UL (ref 4.1–5.7)
RBC #/AREA URNS HPF: NORMAL /HPF (ref 0–5)
SODIUM SERPL-SCNC: 142 MMOL/L (ref 136–145)
SP GR UR REFRACTOMETRY: 1.02 (ref 1–1.03)
UA: UC IF INDICATED,UAUC: NORMAL
UROBILINOGEN UR QL STRIP.AUTO: 1 EU/DL (ref 0.2–1)
WBC # BLD AUTO: 8.6 K/UL (ref 4.1–11.1)
WBC URNS QL MICRO: NORMAL /HPF (ref 0–4)

## 2020-03-10 PROCEDURE — 99284 EMERGENCY DEPT VISIT MOD MDM: CPT

## 2020-03-10 PROCEDURE — 36415 COLL VENOUS BLD VENIPUNCTURE: CPT

## 2020-03-10 PROCEDURE — 85025 COMPLETE CBC W/AUTO DIFF WBC: CPT

## 2020-03-10 PROCEDURE — 74011000250 HC RX REV CODE- 250: Performed by: EMERGENCY MEDICINE

## 2020-03-10 PROCEDURE — 96375 TX/PRO/DX INJ NEW DRUG ADDON: CPT

## 2020-03-10 PROCEDURE — 83690 ASSAY OF LIPASE: CPT

## 2020-03-10 PROCEDURE — 74011250636 HC RX REV CODE- 250/636: Performed by: EMERGENCY MEDICINE

## 2020-03-10 PROCEDURE — 81001 URINALYSIS AUTO W/SCOPE: CPT

## 2020-03-10 PROCEDURE — 96374 THER/PROPH/DIAG INJ IV PUSH: CPT

## 2020-03-10 PROCEDURE — 80053 COMPREHEN METABOLIC PANEL: CPT

## 2020-03-10 RX ORDER — ONDANSETRON 2 MG/ML
8 INJECTION INTRAMUSCULAR; INTRAVENOUS
Status: COMPLETED | OUTPATIENT
Start: 2020-03-10 | End: 2020-03-10

## 2020-03-10 RX ORDER — ONDANSETRON 2 MG/ML
4 INJECTION INTRAMUSCULAR; INTRAVENOUS
Status: DISCONTINUED | OUTPATIENT
Start: 2020-03-10 | End: 2020-03-10

## 2020-03-10 RX ORDER — ONDANSETRON 4 MG/1
4 TABLET, ORALLY DISINTEGRATING ORAL
Qty: 20 TAB | Refills: 0 | OUTPATIENT
Start: 2020-03-10 | End: 2020-10-12

## 2020-03-10 RX ORDER — FAMOTIDINE 20 MG/1
20 TABLET, FILM COATED ORAL 2 TIMES DAILY
Qty: 20 TAB | Refills: 0 | Status: SHIPPED | OUTPATIENT
Start: 2020-03-10 | End: 2020-03-20

## 2020-03-10 RX ORDER — KETOROLAC TROMETHAMINE 30 MG/ML
30 INJECTION, SOLUTION INTRAMUSCULAR; INTRAVENOUS
Status: COMPLETED | OUTPATIENT
Start: 2020-03-10 | End: 2020-03-10

## 2020-03-10 RX ORDER — DICYCLOMINE HYDROCHLORIDE 10 MG/1
20 CAPSULE ORAL
Qty: 30 CAP | Refills: 0 | OUTPATIENT
Start: 2020-03-10 | End: 2020-10-12

## 2020-03-10 RX ORDER — ONDANSETRON 2 MG/ML
8 INJECTION INTRAMUSCULAR; INTRAVENOUS
Status: DISCONTINUED | OUTPATIENT
Start: 2020-03-10 | End: 2020-03-10

## 2020-03-10 RX ADMIN — FAMOTIDINE 20 MG: 10 INJECTION INTRAVENOUS at 16:49

## 2020-03-10 RX ADMIN — KETOROLAC TROMETHAMINE 30 MG: 30 INJECTION, SOLUTION INTRAMUSCULAR; INTRAVENOUS at 16:49

## 2020-03-10 RX ADMIN — SODIUM CHLORIDE 1000 ML: 900 INJECTION, SOLUTION INTRAVENOUS at 16:49

## 2020-03-10 RX ADMIN — ONDANSETRON 8 MG: 2 SOLUTION INTRAMUSCULAR; INTRAVENOUS at 16:49

## 2020-03-10 NOTE — DISCHARGE INSTRUCTIONS
Thank you for allowing us to take care of you today! We hope we addressed all of your concerns and needs. We strive to provide excellent quality care in the Emergency Department. You will receive a survey after your visit to evaluate the care you were provided. Should you receive a survey from us, we invite you to share your experience and tell us what made it excellent. It was a pleasure serving you, we invite you to share your experience with us, in our pursuit for excellence, should you be selected to receive a survey. The exam and treatment you received in the Emergency Department were for an urgent problem and are not intended as complete care. It is important that you follow up with a doctor, nurse practitioner, or physician assistant for ongoing care. If your symptoms become worse or you do not improve as expected and you are unable to reach your usual health care provider, you should return to the Emergency Department. We are available 24 hours a day. Please take your discharge instructions with you when you go to your follow-up appointment. If you have any problem arranging a follow-up appointment, contact the Emergency Department immediately. If a prescription has been provided, please have it filled as soon as possible to prevent a delay in treatment. Read the entire medication instruction sheet provided to you by the pharmacy. If you have any questions or reservations about taking the medication due to side effects or interactions with other medications, please call your primary care physician or contact the ER to speak with the charge nurse. Make an appointment with your family doctor or the physician you were referred to for follow-up of this visit as instructed on your discharge paperwork, as this is mandatory follow-up. Return to the ER if you are unable to be seen or if you are unable to be seen in a timely manner.     If you have any problem arranging the follow-up visit, contact the Emergency Department immediately. I hope you feel better and thank you again for allow us to provide you with excellent care today at [facility]! Warmest regards,    Minesh Cancino MD  Emergency Medicine Physician  [facility]      _____________________________________________________________________________________________________________    Vitals:    03/10/20 1555   BP: (!) 158/112   BP 1 Location: Left arm   BP Patient Position: Sitting   Pulse: (!) 104   Resp: 16   Temp: 97.8 °F (36.6 °C)   SpO2: 96%   Weight: 111.1 kg (245 lb)   Height: 6' (1.829 m)       Recent Results (from the past 12 hour(s))   CBC WITH AUTOMATED DIFF    Collection Time: 03/10/20  4:36 PM   Result Value Ref Range    WBC 8.6 4.1 - 11.1 K/uL    RBC 4.90 4. 10 - 5.70 M/uL    HGB 15.4 12.1 - 17.0 g/dL    HCT 42.5 36.6 - 50.3 %    MCV 86.7 80.0 - 99.0 FL    MCH 31.4 26.0 - 34.0 PG    MCHC 36.2 30.0 - 36.5 g/dL    RDW 14.7 (H) 11.5 - 14.5 %    PLATELET 758 (L) 653 - 400 K/uL    MPV 11.6 8.9 - 12.9 FL    NRBC 0.0 0  WBC    ABSOLUTE NRBC 0.00 0.00 - 0.01 K/uL    NEUTROPHILS 53 32 - 75 %    LYMPHOCYTES 36 12 - 49 %    MONOCYTES 8 5 - 13 %    EOSINOPHILS 2 0 - 7 %    BASOPHILS 0 0 - 1 %    IMMATURE GRANULOCYTES 1 (H) 0.0 - 0.5 %    ABS. NEUTROPHILS 4.5 1.8 - 8.0 K/UL    ABS. LYMPHOCYTES 3.1 0.8 - 3.5 K/UL    ABS. MONOCYTES 0.7 0.0 - 1.0 K/UL    ABS. EOSINOPHILS 0.2 0.0 - 0.4 K/UL    ABS. BASOPHILS 0.0 0.0 - 0.1 K/UL    ABS. IMM.  GRANS. 0.0 0.00 - 0.04 K/UL    DF AUTOMATED     METABOLIC PANEL, COMPREHENSIVE    Collection Time: 03/10/20  4:36 PM   Result Value Ref Range    Sodium 142 136 - 145 mmol/L    Potassium 3.9 3.5 - 5.1 mmol/L    Chloride 105 97 - 108 mmol/L    CO2 27 21 - 32 mmol/L    Anion gap 10 5 - 15 mmol/L    Glucose 107 (H) 65 - 100 mg/dL    BUN 12 6 - 20 MG/DL    Creatinine 1.23 0.70 - 1.30 MG/DL    BUN/Creatinine ratio 10 (L) 12 - 20 GFR est AA >60 >60 ml/min/1.73m2    GFR est non-AA >60 >60 ml/min/1.73m2    Calcium 8.7 8.5 - 10.1 MG/DL    Bilirubin, total 0.7 0.2 - 1.0 MG/DL    ALT (SGPT) 51 12 - 78 U/L    AST (SGOT) 39 (H) 15 - 37 U/L    Alk.  phosphatase 59 45 - 117 U/L    Protein, total 8.4 (H) 6.4 - 8.2 g/dL    Albumin 4.2 3.5 - 5.0 g/dL    Globulin 4.2 (H) 2.0 - 4.0 g/dL    A-G Ratio 1.0 (L) 1.1 - 2.2     LIPASE    Collection Time: 03/10/20  4:36 PM   Result Value Ref Range    Lipase 155 73 - 393 U/L   URINALYSIS W/ REFLEX CULTURE    Collection Time: 03/10/20  4:55 PM   Result Value Ref Range    Color YELLOW/STRAW      Appearance CLEAR CLEAR      Specific gravity 1.020 1.003 - 1.030      pH (UA) 5.5 5.0 - 8.0      Protein NEGATIVE  NEG mg/dL    Glucose NEGATIVE  NEG mg/dL    Ketone NEGATIVE  NEG mg/dL    Bilirubin NEGATIVE  NEG      Blood NEGATIVE  NEG      Urobilinogen 1.0 0.2 - 1.0 EU/dL    Nitrites NEGATIVE  NEG      Leukocyte Esterase NEGATIVE  NEG      WBC 0-4 0 - 4 /hpf    RBC 0-5 0 - 5 /hpf    Epithelial cells FEW FEW /lpf    Bacteria NEGATIVE  NEG /hpf    UA:UC IF INDICATED CULTURE NOT INDICATED BY UA RESULT CNI         No orders to display     CT Results  (Last 48 hours)    None          Local Primary Care Physicians   [facility] [phone]  MD Enriqueta Beth MD Enos Requena, MD [facility] Doctors [phone]  Mary Hernandez, [facility]  Glinda Dancer, MD Arlan Pont, MD Minna Blizzard, MD [address] [phone]  [place] [address] [facility] [phone]  MD Shin Alvarado MD Westly Milks, MD Placido Chain, MD   [facility] [phone]  [facility] DIANA, MD Xenia Marie, MD  [facility] Press, NP [address] [phone]  Neetu Quinonez, MD Mariam Peraza, MD Jean-Claude Garcia, MD Jabari Galan, MD Hina Keller, MD Fabiana Shea, MD [place]  [place] [phone]  Ap Cruz MD [facility] [phone]  MD Zakiya Amos, NP  China Corona, MD Alyson Esparza, MD Inga Mitchell MD Delcie Hoit, MD   [address] [facility] [phone]  Jackey Jeans, MD Gege Marr, FNP  Son Quintero, NP  Carol Tucker, MD Christinia Model, MD Juanda Kay, MD Leotis Brittle, MD EPHRAIM [facility] [phone]  Beverly Cheatham, MD Mary Chau, MD Hunter Olivera, MD Lionel Ramirez MD   [address] [phone]  MD Castillo Dorman MD [facility] [phone]  MD Derek Erwin MD Luanne Passey, MD   [facility] [phone]  Ophelia Estes, MD Rebekah Howell, MD Skipper Platts, MD Benigno Dakins, MD Jennie Caul, MD Wendy Appiah, EDWIN Sidhu MD [address] [facility]   [phone]  MD Luis Carlos Paris MD Jillian Stank, MD     [address] [facility] [phone]  MD Cole Schilling, CARLOTAP  ZACK Magaña, ZACK Moore MD Dory Antonio, EDWIN Rosales,    Miscellaneous:  Nathalie Hoyos MD [facility] Departments   For adult and child immunizations, family planning, TB screening, STD testing and women's health services. [facility] [place] [phone]     [address]22   [facility]   [address] [place] [address]: [address] [phone] [address] [place] [address]        [address] [address]  For primary care services, woman and child wellness, and some clinics providing specialty care. U -- [address] [place] [address] [phone]   [address] [place] [address] Center 200 [facility] 3614 [facility] [phone]   [address] [facility] [address] [phone] [address]  [employer] 1604 [facility] 5850  Community  [phone]   [address] [facility] [address] [address] [address] [place] [phone]   [facility] 81 [facility] [phone]   Gabriella Carverus [facility] [address] [place] [address] [phone]   [facility]: [address] [facility] [address]     [place] [address]578 [facility]  2095  Community  [phone]   [employer]  [address] ([email or web address])         Sexual Health/Woman Wellness Clinics   For STD/HIV testing and treatment, pregnancy testing and services, men's health, birth control services, LGBT services, and hepatitis/HPV vaccine services. [employer] [employer] for [place] [employer] [address] [facility] 75 [facility] 1579 600 [facility] [phone]   [facility] [address] 5th floor [phone]   Pregnancy [address] [facility] [address] [facility] [phone]        [place] [address] [facility]   [phone]   [place]   [phone]   Women, Infant and Children's Services: [address] [phone]       [place] [address]    [phone]   [facility]   [phone]   [address]   [address] [facility] [address]       Patient Education        Abdominal Pain: Care Instructions  Your Care Instructions    Abdominal pain has many possible causes. Some aren't serious and get better on their own in a few days. Others need more testing and treatment.  If your pain continues or gets worse, you need to be rechecked and may need more tests to find out what is wrong. You may need surgery to correct the problem. Don't ignore new symptoms, such as fever, nausea and vomiting, urination problems, pain that gets worse, and dizziness. These may be signs of a more serious problem. Your doctor may have recommended a follow-up visit in the next 8 to 12 hours. If you are not getting better, you may need more tests or treatment. The doctor has checked you carefully, but problems can develop later. If you notice any problems or new symptoms, get medical treatment right away. Follow-up care is a key part of your treatment and safety. Be sure to make and go to all appointments, and call your doctor if you are having problems. It's also a good idea to know your test results and keep a list of the medicines you take. How can you care for yourself at home? · Rest until you feel better. · To prevent dehydration, drink plenty of fluids, enough so that your urine is light yellow or clear like water. Choose water and other caffeine-free clear liquids until you feel better. If you have kidney, heart, or liver disease and have to limit fluids, talk with your doctor before you increase the amount of fluids you drink. · If your stomach is upset, eat mild foods, such as rice, dry toast or crackers, bananas, and applesauce. Try eating several small meals instead of two or three large ones. · Wait until 48 hours after all symptoms have gone away before you have spicy foods, alcohol, and drinks that contain caffeine. · Do not eat foods that are high in fat. · Avoid anti-inflammatory medicines such as aspirin, ibuprofen (Advil, Motrin), and naproxen (Aleve). These can cause stomach upset. Talk to your doctor if you take daily aspirin for another health problem. When should you call for help? Call 911 anytime you think you may need emergency care.  For example, call if:    · You passed out (lost consciousness).     · You pass maroon or very bloody stools.     · You vomit blood or what looks like coffee grounds.     · You have new, severe belly pain.    Call your doctor now or seek immediate medical care if:    · Your pain gets worse, especially if it becomes focused in one area of your belly.     · You have a new or higher fever.     · Your stools are black and look like tar, or they have streaks of blood.     · You have unexpected vaginal bleeding.     · You have symptoms of a urinary tract infection. These may include:  ? Pain when you urinate. ? Urinating more often than usual.  ? Blood in your urine.     · You are dizzy or lightheaded, or you feel like you may faint.    Watch closely for changes in your health, and be sure to contact your doctor if:    · You are not getting better after 1 day (24 hours). Where can you learn more? Go to http://yolandaEdgeCast Networksedil.info/. Enter T435 in the search box to learn more about \"Abdominal Pain: Care Instructions. \"  Current as of: June 26, 2019  Content Version: 12.2  © 5613-4825 [employer]. Care instructions adapted under license by [address] (which disclaims liability or warranty for this information). If you have questions about a medical condition or this instruction, always ask your healthcare professional. [address] any warranty or liability for your use of this information. Patient Education        Diarrhea: Care Instructions  Your Care Instructions    Diarrhea is loose, watery stools (bowel movements). The exact cause is often hard to find. Sometimes diarrhea is your body's way of getting rid of what caused an upset stomach. Viruses, food poisoning, and many medicines can cause diarrhea. Some people get diarrhea in response to emotional stress, anxiety, or certain foods. Almost everyone has diarrhea now and then. It usually isn't serious, and your stools will return to normal soon.  The important thing to do is replace the fluids you have lost, so you can prevent dehydration. The doctor has checked you carefully, but problems can develop later. If you notice any problems or new symptoms, get medical treatment right away. Follow-up care is a key part of your treatment and safety. Be sure to make and go to all appointments, and call your doctor if you are having problems. It's also a good idea to know your test results and keep a list of the medicines you take. How can you care for yourself at home? · Watch for signs of dehydration, which means your body has lost too much water. Dehydration is a serious condition and should be treated right away. Signs of dehydration are:  ? Increasing thirst and dry eyes and mouth. ? Feeling faint or lightheaded. ? A smaller amount of urine than normal.  · To prevent dehydration, drink plenty of fluids. Choose water and other caffeine-free clear liquids until you feel better. If you have kidney, heart, or liver disease and have to limit fluids, talk with your doctor before you increase the amount of fluids you drink. · Begin eating small amounts of mild foods the next day, if you feel like it. ? Try yogurt that has live cultures of Lactobacillus. (Check the label.)  ? Avoid spicy foods, fruits, alcohol, and caffeine until 48 hours after all symptoms are gone. ? Avoid chewing gum that contains sorbitol. ? Avoid dairy products (except for yogurt with Lactobacillus) while you have diarrhea and for 3 days after symptoms are gone. · The doctor may recommend that you take over-the-counter medicine, such as loperamide (Imodium), if you still have diarrhea after 6 hours. Read and follow all instructions on the label. Do not use this medicine if you have bloody diarrhea, a high fever, or other signs of serious illness. Call your doctor if you think you are having a problem with your medicine. When should you call for help? Call 911 anytime you think you may need emergency care.  For example, call if:    · You passed out (lost consciousness).     · Your stools are maroon or very bloody.    Call your doctor now or seek immediate medical care if:    · You are dizzy or lightheaded, or you feel like you may faint.     · Your stools are black and look like tar, or they have streaks of blood.     · You have new or worse belly pain.     · You have symptoms of dehydration, such as:  ? Dry eyes and a dry mouth. ? Passing only a little dark urine. ? Feeling thirstier than usual.     · You have a new or higher fever.    Watch closely for changes in your health, and be sure to contact your doctor if:    · Your diarrhea is getting worse.     · You see pus in the diarrhea.     · You are not getting better after 2 days (48 hours). Where can you learn more? Go to http://yolanda-edil.info/. Enter U949 in the search box to learn more about \"Diarrhea: Care Instructions. \"  Current as of: June 26, 2019  Content Version: 12.2  © 5892-0592 [employer]. Care instructions adapted under license by [employer] (which disclaims liability or warranty for this information). If you have questions about a medical condition or this instruction, always ask your healthcare professional. [address] any warranty or liability for your use of this information.

## 2020-03-10 NOTE — LETTER
[facility] EMERGENCY DEPT 
[address] [address] 
[phone] Work/School Note Date: 3/10/2020 To Whom It May concern: 
 
Mary Da Silva was seen and treated today in the emergency room by the following provider(s): 
Attending Provider: Narendra Bruner MD. Mary Da Silva may return to work on 3/12/20. Sincerely, Alejandro Clemons MD

## 2020-03-10 NOTE — ED NOTES
Emergency Department Nursing Plan of Care       The Nursing Plan of Care is developed from the Nursing assessment and Emergency Department Attending provider initial evaluation. The plan of care may be reviewed in the ED Provider note. The Plan of Care was developed with the following considerations:   Patient / Family readiness to learn indicated by:verbalized understanding  Persons(s) to be included in education: patient  Barriers to Learning/Limitations:No    Signed     Rae Gonzalez    3/10/2020   4:31 PM    Pt reports to the ER w/ abdominal pain, nausea, vomiting and intermittent headache x 36 hours. Reports he had a normal stool this morning and the last episode of vomiting was about 10 hours ago. Reports he has not had any diahrrea. Alert and oriented x 4. Skin warm,dry and intact. Ambulates independently. Resting in bed comfortably.

## 2020-03-10 NOTE — ED PROVIDER NOTES
EMERGENCY DEPARTMENT HISTORY AND PHYSICAL EXAM      Please note that this dictation was completed with [employer], the computer voice recognition software. Quite often unanticipated grammatical, syntax, homophones, and other interpretive errors are inadvertently transcribed by the computer software. Please disregard these errors and any errors that have escaped final proofreading. Thank you. Date: 3/10/2020  Patient Name: El Hein  Patient Age and Sex: 64 y.o. male    History of Presenting Illness     Chief Complaint   Patient presents with    Abdominal Pain       History Provided By: Patient    HPI: El Hein, 64 y.o. male with past medical history as documented below presents to the ED with c/o of 2 days of lower abdominal pain with associated nausea and diarrhea. Patient also reports a mildly graduating headache during this period. Patient states that he has had an appendectomy in the past.  Patient states that the symptoms are mild to moderate and crampy in quality. He reports feeling nauseous currently but no emesis. He believes that he may have acquired a virus from one of his coworkers. He has tried over-the-counter medications with some relief of sx's. He reports multiple episodes of loose stool yesterday but none in the past 12 hours. He denies any blood in his stool. Pt denies any other alleviating or exacerbating factors. Additionally, pt specifically denies any recent fever, chills, vomiting, CP, SOB, lightheadedness, dizziness, numbness, weakness, BLE swelling, heart palpitations, urinary sxs, constipation, melena, hematochezia, cough, or congestion. There are no other complaints, changes or physical findings at this time.      PCP: Mendez Reynolds MD    Past History   Past Medical History:  Past Medical History:   Diagnosis Date    Gastrointestinal disorder     GERDS    GERD (gastroesophageal reflux disease)     Ill-defined condition     MRSA       Past Surgical History:  Past Surgical History:   Procedure Laterality Date    HX APPENDECTOMY      HX HEENT      right eye surgery    HX OTHER SURGICAL      right eye surgery    HX UROLOGICAL      laser surgery on \"my private parts\"       Family History:  History reviewed. No pertinent family history. Social History:  Social History     Tobacco Use    Smoking status: Never Smoker    Smokeless tobacco: Never Used   Substance Use Topics    Alcohol use: Yes     Comment: wine coolers    Drug use: No     Types: Marijuana       Allergies:  No Known Allergies    Current Medications:  No current facility-administered medications on file prior to encounter. No current outpatient medications on file prior to encounter. Review of Systems   Review of Systems   Constitutional: Negative. Negative for chills and fever. HENT: Negative. Negative for congestion, facial swelling, rhinorrhea, sore throat, trouble swallowing and voice change. Eyes: Negative. Respiratory: Negative. Negative for apnea, cough, chest tightness, shortness of breath and wheezing. Cardiovascular: Negative. Negative for chest pain, palpitations and leg swelling. Gastrointestinal: Positive for abdominal pain, diarrhea and nausea. Negative for abdominal distention, blood in stool, constipation and vomiting. Endocrine: Negative. Negative for cold intolerance, heat intolerance and polyuria. Genitourinary: Negative. Negative for difficulty urinating, dysuria, flank pain, frequency, hematuria and urgency. Musculoskeletal: Negative. Negative for arthralgias, back pain, myalgias, neck pain and neck stiffness. Skin: Negative. Negative for color change and rash. Neurological: Positive for headaches. Negative for dizziness, syncope, facial asymmetry, speech difficulty, weakness, light-headedness and numbness. Hematological: Negative. Does not bruise/bleed easily. Psychiatric/Behavioral: Negative. Negative for confusion and self-injury.  The patient is not nervous/anxious. Physical Exam   Physical Exam  Vitals signs and nursing note reviewed. Constitutional:       Appearance: He is well-developed. He is not toxic-appearing. HENT:      Head: Normocephalic and atraumatic. Mouth/Throat:      Pharynx: No posterior oropharyngeal erythema. Eyes:      Conjunctiva/sclera: Conjunctivae normal.      Pupils: Pupils are equal, round, and reactive to light. Neck:      Musculoskeletal: Normal range of motion. Cardiovascular:      Rate and Rhythm: Normal rate and regular rhythm. Heart sounds: Normal heart sounds. No murmur. No friction rub. No gallop. Pulmonary:      Effort: Pulmonary effort is normal. No respiratory distress. Breath sounds: Normal breath sounds. No wheezing or rales. Chest:      Chest wall: No tenderness. Abdominal:      General: Bowel sounds are normal. There is no distension. Palpations: Abdomen is soft. There is no mass. Tenderness: There is no abdominal tenderness. There is no guarding or rebound. Musculoskeletal: Normal range of motion. General: No tenderness or deformity. Skin:     General: Skin is warm. Findings: No rash. Neurological:      Mental Status: He is alert and oriented to person, place, and time. Cranial Nerves: No cranial nerve deficit. Motor: No abnormal muscle tone. Coordination: Coordination normal.      Deep Tendon Reflexes: Reflexes normal.   Psychiatric:         Behavior: Behavior is cooperative. Diagnostic Study Results     Labs -  Recent Results (from the past 24 hour(s))   CBC WITH AUTOMATED DIFF    Collection Time: 03/10/20  4:36 PM   Result Value Ref Range    WBC 8.6 4.1 - 11.1 K/uL    RBC 4.90 4. 10 - 5.70 M/uL    HGB 15.4 12.1 - 17.0 g/dL    HCT 42.5 36.6 - 50.3 %    MCV 86.7 80.0 - 99.0 FL    MCH 31.4 26.0 - 34.0 PG    MCHC 36.2 30.0 - 36.5 g/dL    RDW 14.7 (H) 11.5 - 14.5 %    PLATELET 980 (L) 464 - 400 K/uL    MPV 11.6 8.9 - 12.9 FL    NRBC 0.0 0  WBC    ABSOLUTE NRBC 0.00 0.00 - 0.01 K/uL    NEUTROPHILS 53 32 - 75 %    LYMPHOCYTES 36 12 - 49 %    MONOCYTES 8 5 - 13 %    EOSINOPHILS 2 0 - 7 %    BASOPHILS 0 0 - 1 %    IMMATURE GRANULOCYTES 1 (H) 0.0 - 0.5 %    ABS. NEUTROPHILS 4.5 1.8 - 8.0 K/UL    ABS. LYMPHOCYTES 3.1 0.8 - 3.5 K/UL    ABS. MONOCYTES 0.7 0.0 - 1.0 K/UL    ABS. EOSINOPHILS 0.2 0.0 - 0.4 K/UL    ABS. BASOPHILS 0.0 0.0 - 0.1 K/UL    ABS. IMM. GRANS. 0.0 0.00 - 0.04 K/UL    DF AUTOMATED     METABOLIC PANEL, COMPREHENSIVE    Collection Time: 03/10/20  4:36 PM   Result Value Ref Range    Sodium 142 136 - 145 mmol/L    Potassium 3.9 3.5 - 5.1 mmol/L    Chloride 105 97 - 108 mmol/L    CO2 27 21 - 32 mmol/L    Anion gap 10 5 - 15 mmol/L    Glucose 107 (H) 65 - 100 mg/dL    BUN 12 6 - 20 MG/DL    Creatinine 1.23 0.70 - 1.30 MG/DL    BUN/Creatinine ratio 10 (L) 12 - 20      GFR est AA >60 >60 ml/min/1.73m2    GFR est non-AA >60 >60 ml/min/1.73m2    Calcium 8.7 8.5 - 10.1 MG/DL    Bilirubin, total 0.7 0.2 - 1.0 MG/DL    ALT (SGPT) 51 12 - 78 U/L    AST (SGOT) 39 (H) 15 - 37 U/L    Alk.  phosphatase 59 45 - 117 U/L    Protein, total 8.4 (H) 6.4 - 8.2 g/dL    Albumin 4.2 3.5 - 5.0 g/dL    Globulin 4.2 (H) 2.0 - 4.0 g/dL    A-G Ratio 1.0 (L) 1.1 - 2.2     LIPASE    Collection Time: 03/10/20  4:36 PM   Result Value Ref Range    Lipase 155 73 - 393 U/L   URINALYSIS W/ REFLEX CULTURE    Collection Time: 03/10/20  4:55 PM   Result Value Ref Range    Color YELLOW/STRAW      Appearance CLEAR CLEAR      Specific gravity 1.020 1.003 - 1.030      pH (UA) 5.5 5.0 - 8.0      Protein NEGATIVE  NEG mg/dL    Glucose NEGATIVE  NEG mg/dL    Ketone NEGATIVE  NEG mg/dL    Bilirubin NEGATIVE  NEG      Blood NEGATIVE  NEG      Urobilinogen 1.0 0.2 - 1.0 EU/dL    Nitrites NEGATIVE  NEG      Leukocyte Esterase NEGATIVE  NEG      WBC 0-4 0 - 4 /hpf    RBC 0-5 0 - 5 /hpf    Epithelial cells FEW FEW /lpf    Bacteria NEGATIVE  NEG /hpf    UA:UC IF INDICATED CULTURE NOT INDICATED BY UA RESULT CNI         Radiologic Studies -   No orders to display     CT Results  (Last 48 hours)    None        CXR Results  (Last 48 hours)    None          Medical Decision Making   I am the first provider for this patient. I reviewed the vital signs, available nursing notes, past medical history, past surgical history, family history and social history. Vital Signs-Reviewed the patient's vital signs. Patient Vitals for the past 24 hrs:   Temp Pulse Resp BP SpO2   03/10/20 1555 97.8 °F (36.6 °C) (!) 104 16 (!) 158/112 96 %       Pulse Oximetry Analysis - 96% on RA    Cardiac Monitor:   Rate: 104 bpm  Rhythm: Sinus Tachycardia      Records Reviewed: Nursing Notes, Old Medical Records, Previous electrocardiograms, Previous Radiology Studies and Previous Laboratory Studies    Provider Notes (Medical Decision Making):   Pt presents with acute abdominal pain; vital signs stable with currently a non-peritoneal exam; DDx includes: Gastroenteritis, hepatitis, pancreatitis, obstruction, appendicitis, viral illness, IBD, diverticulitis, mesenteric ischemia, AAA or descending dissection, ACS, kidney stone. Will get labs, treat symptomatically and obtain serial abdominal exams to determine if additional imaging is indicated. Will reassess and monitor closely. ED Course:   Initial assessment performed. The patients presenting problems have been discussed, and they are in agreement with the care plan formulated and outlined with them. I have encouraged them to ask questions as they arise throughout their visit. ALCOHOL/SUBSTANCE ABUSE COUNSELING:  Upon evaluation, pt endorsed recent alcohol/illicit drug use. For approximately 15 minutes, pt has been counseled on the dangers of alcohol and illicit drug use on their health, and they were encouraged to quit as soon as possible in order to decrease further risks to their health.  Pt has conveyed their understanding of the risks involved should they continue to use these products. I reviewed our electronic medical record system for any past medical records that were available that may contribute to the patient's current condition, the nursing notes and vital signs from today's visit. Isma Valentine MD    ED Orders Placed :  Orders Placed This Encounter    CBC WITH AUTOMATED DIFF    METABOLIC PANEL, COMPREHENSIVE    LIPASE    URINALYSIS W/ REFLEX CULTURE    PO CHALLENGE    INSERT PERIPHERAL IV ONE TIME STAT    ketorolac (TORADOL) injection 30 mg    DISCONTD: ondansetron (ZOFRAN) injection 8 mg    sodium chloride 0.9 % bolus infusion 1,000 mL    famotidine (PF) (PEPCID) 20 mg in 0.9% sodium chloride 10 mL injection    DISCONTD: ondansetron (ZOFRAN) injection 4 mg    ondansetron (ZOFRAN) injection 8 mg    ondansetron (ZOFRAN ODT) 4 mg disintegrating tablet    famotidine (PEPCID) 20 mg tablet    dicyclomine (BENTYL) 10 mg capsule     ED Medications Administered:  Medications   sodium chloride 0.9 % bolus infusion 1,000 mL (1,000 mL IntraVENous New Bag 3/10/20 1649)   ketorolac (TORADOL) injection 30 mg (30 mg IntraVENous Given 3/10/20 1649)   famotidine (PF) (PEPCID) 20 mg in 0.9% sodium chloride 10 mL injection (20 mg IntraVENous Given 3/10/20 1649)   ondansetron (ZOFRAN) injection 8 mg (8 mg IntraVENous Given 3/10/20 1649)         Progress Note:  I have re-examined the patient. Pt states he feels much better and symptoms improved. Tolerating oral intake. Abdomen is soft and without guarding, rebound or other peritoneal signs. I have discussed with patient the importance of close f/u and to return to the ED if symptoms don't improve or worsen. Progress Note:  Patient has been reassessed and reports feeling better and symptoms have improved significantly after ED treatment. Patient feels comfortable going home with close follow-up.  Bud Barrios final labs and imaging have been reviewed with him and available family and/or caregiver. They have been counseled regarding his diagnosis. He verbally conveys understanding and agreement of the signs, symptoms, diagnosis, treatment and prognosis and additionally agrees to follow up as recommended with Dr. Elvira Knott MD and/or specialist in 24 - 48 hours. He also agrees with the care-plan we created together and conveys that all of his questions have been answered. I have also put together some discharge instructions for him that include: 1) educational information regarding their diagnosis, 2) how to care for their diagnosis at home, as well a 3) list of reasons why they would want to return to the ED prior to their follow-up appointment should the patient's condition change or symptoms worsen. I have answered all questions to the patient's satisfaction. Strict return precautions given. He both understood and agreed with plan as discussed. Vital signs stable for discharge. Pt very appreciative of care today. Disposition: Discharge  The pt is ready for discharge. The pt's signs, symptoms, diagnosis, and discharge instructions have been discussed and pt has conveyed their understanding. The pt is to follow up as recommended or return to ER should their symptoms worsen. Plan has been discussed and pt is in full agreement. Plan:  1. Return precautions as discussed. 2.   Current Discharge Medication List      START taking these medications    Details   ondansetron (ZOFRAN ODT) 4 mg disintegrating tablet Take 1 Tab by mouth every eight (8) hours as needed for Nausea or Vomiting. Qty: 20 Tab, Refills: 0      famotidine (PEPCID) 20 mg tablet Take 1 Tab by mouth two (2) times a day for 10 days. Qty: 20 Tab, Refills: 0      dicyclomine (BENTYL) 10 mg capsule Take 2 Caps by mouth three (3) times daily as needed for Abdominal Cramps, Cramping or Pain. Qty: 30 Cap, Refills: 0           3.    Follow-up Information     Follow up With Specialties Details Why Contact Info    [address] EMERGENCY DEPT Emergency Medicine   [address] [facility] [address]    Ariel Jimenez MD Family [facility]  As needed, If symptoms worsen [address] [place] [address]  [phone]            Instructed to return to ED if worse  Diagnosis     Clinical Impression:   1. Acute abdominal pain    2. Nausea without vomiting    3. Diarrhea, unspecified type    4. Viral gastroenteritis    5. Acute non intractable tension-type headache      Attestation:  I personally performed the services described in this documentation on this date, 3/10/2020 for patient Nael Monreal. I have reviewed and verified that the information is accurate and complete. Siddhartha Castillo MD      This note will not be viewable in [address]

## 2020-03-10 NOTE — ED NOTES
Pt resting comfortably in bed. Reports his pain has gone down, he has very little nausea and no more acid reflux.  Denies needing anything

## 2020-03-10 NOTE — ED NOTES
Patient (s) 0 given copy of dc instructions and 3 paper script(s) and  electronic scripts. Patient (s)  verbalized understanding of instructions and script (s). Patient given a current medication reconciliation form and verbalized understanding of their medications. Patient (s) verbalized understanding of the importance of discussing medications with  his or her physician or clinic they will be following up with. Patient alert and oriented and in no acute distress. Patient offered wheelchair from treatment area to hospital entrance, patient denies wheelchair.

## 2020-10-12 ENCOUNTER — HOSPITAL ENCOUNTER (EMERGENCY)
Age: 60
Discharge: HOME | End: 2020-10-12
Attending: EMERGENCY MEDICINE
Payer: COMMERCIAL

## 2020-10-12 VITALS
HEART RATE: 91 BPM | DIASTOLIC BLOOD PRESSURE: 90 MMHG | HEIGHT: 72 IN | TEMPERATURE: 98.7 F | OXYGEN SATURATION: 100 % | SYSTOLIC BLOOD PRESSURE: 156 MMHG | WEIGHT: 245 LBS | RESPIRATION RATE: 16 BRPM | BODY MASS INDEX: 33.18 KG/M2

## 2020-10-12 DIAGNOSIS — K52.9 GASTROENTERITIS: Primary | ICD-10-CM

## 2020-10-12 PROCEDURE — 99282 EMERGENCY DEPT VISIT SF MDM: CPT

## 2020-10-12 RX ORDER — IBUPROFEN 800 MG/1
800 TABLET ORAL
Qty: 20 TAB | Refills: 0 | Status: SHIPPED | OUTPATIENT
Start: 2020-10-12 | End: 2020-10-19

## 2020-10-12 NOTE — ED NOTES
Discharge instructions were given to the patient by Martha Quinonez RN. The patient left the Emergency Department ambulatory, alert and oriented and in no acute distress with 1 prescriptions. The patient was encouraged to call or return to the ED for worsening issues or problems and was encouraged to schedule a follow up appointment for continuing care. The patient verbalized understanding of discharge instructions and prescriptions, all questions were answered. The patient has no further concerns at this time.

## 2020-10-12 NOTE — LETTER
[facility] EMERGENCY DEPT 
[address] [address] 
[phone] Work/School Note Date: 10/12/2020 To Whom It May concern: 
 
Jessie Rendon was seen and treated today in the emergency room by the following provider(s): 
Attending Provider: Felipa Hartman MD 
Physician Assistant: Brian Ang PA-C. Jessie Rendon may return to work on 10/14/20. Sincerely, Cecile Fuentes PA-C

## 2020-10-12 NOTE — DISCHARGE INSTRUCTIONS
Patient Education        Gastroenteritis: Care Instructions  Your Care Instructions     Gastroenteritis is an illness that may cause nausea, vomiting, and diarrhea. It is sometimes called \"stomach flu. \" It can be caused by bacteria or a virus. You will probably begin to feel better in 1 to 2 days. In the meantime, get plenty of rest and make sure you do not become dehydrated. Dehydration occurs when your body loses too much fluid. Follow-up care is a key part of your treatment and safety. Be sure to make and go to all appointments, and call your doctor if you are having problems. It's also a good idea to know your test results and keep a list of the medicines you take. How can you care for yourself at home? · If your doctor prescribed antibiotics, take them as directed. Do not stop taking them just because you feel better. You need to take the full course of antibiotics. · Drink plenty of fluids to prevent dehydration, enough so that your urine is light yellow or clear like water. Choose water and other caffeine-free clear liquids until you feel better. If you have kidney, heart, or liver disease and have to limit fluids, talk with your doctor before you increase your fluid intake. · Drink fluids slowly, in frequent, small amounts, because drinking too much too fast can cause vomiting. · Begin eating mild foods, such as dry toast, yogurt, applesauce, bananas, and rice. Avoid spicy, hot, or high-fat foods, and do not drink alcohol or caffeine for a day or two. Do not drink milk or eat ice cream until you are feeling better. How to prevent gastroenteritis  · Keep hot foods hot and cold foods cold. · Do not eat meats, dressings, salads, or other foods that have been kept at room temperature for more than 2 hours. · Use a thermometer to check your refrigerator. It should be between 34°F and 40°F.  · Defrost meats in the refrigerator or microwave, not on the kitchen counter.   · Keep your hands and your kitchen clean. Wash your hands, cutting boards, and countertops with hot soapy water frequently. · Cook meat until it is well done. · Do not eat raw eggs or uncooked sauces made with raw eggs. · Do not take chances. If food looks or tastes spoiled, throw it out. When should you call for help? Call 911 anytime you think you may need emergency care. For example, call if:    · You vomit blood or what looks like coffee grounds.     · You passed out (lost consciousness).     · You pass maroon or very bloody stools. Call your doctor now or seek immediate medical care if:    · You have severe belly pain.     · You have signs of needing more fluids. You have sunken eyes, a dry mouth, and pass only a little dark urine.     · You feel like you are going to faint.     · You have increased belly pain that does not go away in 1 to 2 days.     · You have new or increased nausea, or you are vomiting.     · You have a new or higher fever.     · Your stools are black and tarlike or have streaks of blood. Watch closely for changes in your health, and be sure to contact your doctor if:    · You are dizzy or lightheaded.     · You urinate less than usual, or your urine is dark yellow or brown.     · You do not feel better with each day that goes by. Where can you learn more? Go to http://www.marx.com/  Enter N142 in the search box to learn more about \"Gastroenteritis: Care Instructions. \"  Current as of: February 11, 2020               Content Version: 12.6  © 5917-9417 [employer]. Care instructions adapted under license by [employer] (which disclaims liability or warranty for this information). If you have questions about a medical condition or this instruction, always ask your healthcare professional. [address] any warranty or liability for your use of this information.

## 2020-10-12 NOTE — ED PROVIDER NOTES
EMERGENCY DEPARTMENT HISTORY AND PHYSICAL EXAM    Date: 10/12/2020  Patient Name: Bev Chaudhary    History of Presenting Illness     Chief Complaint   Patient presents with    Generalized Body Aches     with headache, nausea, diarrhea         History Provided By: Patient    HPI: Bev Chaudhary is a 64 y.o. male with a PMH of GERD who presents with headache, nausea, vomiting, diarrhea x2 days. Patient states symptoms seem to be improving and he thinks it is \"just a virus. \"  Patient states pain 8 out of 10. Patient states vomiting has resolved and diarrhea has slowed down but he just wanted to come get checked out as he was unable to work today. Patient denies any fevers, chills, shortness of breath. Patient states he has been taking [place] which has helped symptoms. Patient denies any known exposure to COVID-19. PCP: Brandin Blanco MD    Current Outpatient Medications   Medication Sig Dispense Refill    ibuprofen (MOTRIN) 800 mg tablet Take 1 Tab by mouth every eight (8) hours as needed for Pain for up to 7 days. 20 Tab 0       Past History     Past Medical History:  Past Medical History:   Diagnosis Date    Gastrointestinal disorder     GERDS    GERD (gastroesophageal reflux disease)     Ill-defined condition     MRSA       Past Surgical History:  Past Surgical History:   Procedure Laterality Date    HX APPENDECTOMY      HX HEENT      right eye surgery    HX OTHER SURGICAL      right eye surgery    HX UROLOGICAL      laser surgery on \"my private parts\"       Family History:  No family history on file. Social History:  Social History     Tobacco Use    Smoking status: Never Smoker    Smokeless tobacco: Never Used   Substance Use Topics    Alcohol use: Yes     Comment: wine coolers    Drug use: No     Types: Marijuana       Allergies:  No Known Allergies      Review of Systems   Review of Systems   Constitutional: Negative for chills and fever.    Respiratory: Negative for shortness of breath. Gastrointestinal: Positive for diarrhea, nausea and vomiting. Allergic/Immunologic: Negative for immunocompromised state. Neurological: Positive for headaches. Negative for speech difficulty, weakness and light-headedness. All other systems reviewed and are negative. Physical Exam     Vitals:    10/12/20 1451   BP: (!) 156/90   Pulse: 91   Resp: 16   Temp: 98.7 °F (37.1 °C)   SpO2: 100%   Weight: 111.1 kg (245 lb)   Height: 6' (1.829 m)     Physical Exam  Vitals signs and nursing note reviewed. Constitutional:       General: He is not in acute distress. Appearance: He is well-developed. HENT:      Head: Normocephalic and atraumatic. Right Ear: Tympanic membrane normal.      Left Ear: Tympanic membrane normal.      Mouth/Throat:      Pharynx: Oropharynx is clear. No oropharyngeal exudate. Eyes:      Conjunctiva/sclera: Conjunctivae normal.   Cardiovascular:      Rate and Rhythm: Normal rate and regular rhythm. Heart sounds: Normal heart sounds. Pulmonary:      Effort: Pulmonary effort is normal. No respiratory distress. Breath sounds: Normal breath sounds. No wheezing or rales. Abdominal:      General: Bowel sounds are normal.      Palpations: Abdomen is soft. Musculoskeletal: Normal range of motion. Skin:     General: Skin is warm and dry. Neurological:      Mental Status: He is alert and oriented to person, place, and time. Diagnostic Study Results     Labs -   No results found for this or any previous visit (from the past 12 hour(s)). Radiologic Studies -   No orders to display     CT Results  (Last 48 hours)    None        CXR Results  (Last 48 hours)    None            Medical Decision Making   I am the first provider for this patient. I reviewed the vital signs, available nursing notes, past medical history, past surgical history, family history and social history. Vital Signs-Reviewed the patient's vital signs. Records Reviewed:  Old Medical Records    Disposition:  Discharged    DISCHARGE NOTE:   3:41 PM      Care plan outlined and precautions discussed. Patient has no new complaints, changes, or physical findings. All medications were reviewed with the patient; will d/c home. All of pt's questions and concerns were addressed. Patient was instructed and agrees to follow up with PCP PRN, as well as to return to the ED upon further deterioration. Patient is ready to go home. Follow-up Information     Follow up With Specialties Details Why Contact Info    Silva Delgado MD Family Medicine Schedule an appointment as soon as possible for a visit in 2 days As needed [address] [address] [address] [facility] EMERGENCY DEPT Emergency Medicine  If symptoms worsen [facility]  [phone]          Current Discharge Medication List      START taking these medications    Details   ibuprofen (MOTRIN) 800 mg tablet Take 1 Tab by mouth every eight (8) hours as needed for Pain for up to 7 days. Qty: 20 Tab, Refills: 0         STOP taking these medications       ondansetron (ZOFRAN ODT) 4 mg disintegrating tablet Comments:   Reason for Stopping:         dicyclomine (BENTYL) 10 mg capsule Comments:   Reason for Stopping:               Provider Notes (Medical Decision Making):   Nausea/Vomiting/diarrhea:  Most likely gastroenteritis although gastritis, colitis, IBD, IBS on the differential.   He is instructed to push clear fluids, small amounts frequently until improving, then advance diet as tolerated. Imodium OTC prn for diarrhea. May use Gatorade for rehydration. May use BRAT diet. He does not require any imaging or labs at this time. Call or office visit prn if symptoms not responding as expected or develops high fever, significant abdominal pain or bloody stool. Procedures:  Procedures    Please note that this dictation was completed with Dragon, computer voice recognition software. Quite often unanticipated grammatical, syntax, homophones, and other interpretive errors are inadvertently transcribed by the computer software. Please disregard these errors. Additionally, please excuse any errors that have escaped final proofreading. Diagnosis     Clinical Impression:   1.  Gastroenteritis

## 2020-10-12 NOTE — ED NOTES
Pt presents to ED ambulatory complaining of intermittent symptoms x 3 days. Pt reports generalized body aches, diarrhea, abdominal pain, and headaches. Pt appears in NAD at this time. Pt is alert and oriented x 4, RR even and unlabored, skin is warm and dry. Assessment completed and pt updated on plan of care. Call bell in reach. Emergency Department Nursing Plan of Care       The Nursing Plan of Care is developed from the Nursing assessment and Emergency Department Attending provider initial evaluation. The plan of care may be reviewed in the ED Provider note.     The Plan of Care was developed with the following considerations:   Patient / Family readiness to learn indicated by:verbalized understanding  Persons(s) to be included in education: patient  Barriers to Learning/Limitations:No    Signed     Sara Urbina RN    10/12/2020   3:55 PM

## 2020-10-12 NOTE — ED TRIAGE NOTES
CC body aches, diarrhea, headache, and nausea x 2 days. Thinks he has a virus from being out in the rain.

## 2021-01-09 ENCOUNTER — HOSPITAL ENCOUNTER (EMERGENCY)
Age: 61
Discharge: HOME | End: 2021-01-09
Attending: EMERGENCY MEDICINE
Payer: COMMERCIAL

## 2021-01-09 VITALS
HEART RATE: 86 BPM | HEIGHT: 72 IN | SYSTOLIC BLOOD PRESSURE: 151 MMHG | WEIGHT: 252 LBS | TEMPERATURE: 98.1 F | OXYGEN SATURATION: 98 % | RESPIRATION RATE: 16 BRPM | DIASTOLIC BLOOD PRESSURE: 106 MMHG | BODY MASS INDEX: 34.13 KG/M2

## 2021-01-09 DIAGNOSIS — Z20.822 PERSON UNDER INVESTIGATION FOR COVID-19: Primary | ICD-10-CM

## 2021-01-09 PROCEDURE — 87635 SARS-COV-2 COVID-19 AMP PRB: CPT

## 2021-01-09 PROCEDURE — 99282 EMERGENCY DEPT VISIT SF MDM: CPT

## 2021-01-09 NOTE — ED NOTES

## 2021-01-09 NOTE — ED NOTES
Patient here with c/o nausea and cold symptoms. Patient states that he works as a , states that he has been out in the cold recently. Patient states \"I think I just have a head cold, I was feeling nauseous but I didn't throw up. I take a little medication for acid reflux but that's it. \"  Patient denies fevers. Patient states other than acid reflux med he's only had some Dayquil recently. Patient states that he feels that his symptoms are very mild but states that he got sent home from work, stating \"they won't let me come back until after a doctor sees me\". Emergency Department Nursing Plan of Care       The Nursing Plan of Care is developed from the Nursing assessment and Emergency Department Attending provider initial evaluation. The plan of care may be reviewed in the ED Provider note.     The Plan of Care was developed with the following considerations:   Patient / Family readiness to learn indicated by:verbalized understanding  Persons(s) to be included in education: patient  Barriers to Learning/Limitations:No    Signed     Elayne Graves RN    1/9/2021   2:13 PM

## 2021-01-09 NOTE — ED PROVIDER NOTES
EMERGENCY DEPARTMENT HISTORY AND PHYSICAL EXAM    Date: 1/9/2021  Patient Name: Eula Mcknight    History of Presenting Illness     Chief Complaint   Patient presents with    Cough    Nausea         History Provided By: Patient    HPI: Eula Mcknight is a 64 y.o. male with a PMH of GERD who presents with nausea and cough that started yesterday. 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 Patient states he thinks he was sweating a little bit and took it off while outside and it was cold and now thinks he just has a \"a little cold. \"  Patient states when he went to work today and told them of his symptoms they advised that he come be evaluated and he cannot return to work until he had done so. Patient denies any fevers or chills, chest pain, shortness of breath, abdominal pain, vomiting, diarrhea, dysuria. Patient does report some leg pain last night that resolved on its own. Patient states he took some DayQuil which helped resolve the cough. Patient rates pain 7 out of 10 and throbbing in nature. There are no alleviating or exacerbating factors reported. PCP: Cathlean Castleman, MD        Past History     Past Medical History:  Past Medical History:   Diagnosis Date    Gastrointestinal disorder     GERDS    GERD (gastroesophageal reflux disease)     Ill-defined condition     MRSA       Past Surgical History:  Past Surgical History:   Procedure Laterality Date    HX APPENDECTOMY      HX HEENT      right eye surgery    HX OTHER SURGICAL      right eye surgery    HX UROLOGICAL      laser surgery on \"my private parts\"       Family History:  History reviewed. No pertinent family history.     Social History:  Social History     Tobacco Use    Smoking status: Never Smoker    Smokeless tobacco: Never Used   Substance Use Topics    Alcohol use: Yes     Comment: wine coolers    Drug use: Yes     Types: Marijuana       Allergies:  No Known Allergies      Review of Systems   Review of Systems   Constitutional: Negative for chills and fever. Respiratory: Positive for cough. Negative for shortness of breath. Cardiovascular: Negative for chest pain. Gastrointestinal: Positive for nausea. Negative for abdominal pain, diarrhea and vomiting. Musculoskeletal: Positive for myalgias. Allergic/Immunologic: Negative for immunocompromised state. Neurological: Negative for speech difficulty and weakness. All other systems reviewed and are negative. Physical Exam     Vitals:    01/09/21 1253 01/09/21 1404 01/09/21 1516   BP: (!) 164/115 (!) 151/106 (!) 151/106   Pulse: 86     Resp: 16     Temp: 98.1 °F (36.7 °C)     SpO2: 98%     Weight: 114.3 kg (252 lb)     Height: 6' (1.829 m)       Physical Exam  Vitals signs and nursing note reviewed. Constitutional:       General: He is not in acute distress. Appearance: He is well-developed. HENT:      Head: Normocephalic and atraumatic. Mouth/Throat:      Pharynx: No oropharyngeal exudate. Eyes:      Conjunctiva/sclera: Conjunctivae normal.   Cardiovascular:      Rate and Rhythm: Normal rate and regular rhythm. Heart sounds: Normal heart sounds. Pulmonary:      Effort: Pulmonary effort is normal. No respiratory distress. Breath sounds: Normal breath sounds. No wheezing or rales. Abdominal:      General: Bowel sounds are normal.      Palpations: Abdomen is soft. Tenderness: There is no abdominal tenderness. There is no guarding or rebound. Musculoskeletal: Normal range of motion. Skin:     General: Skin is warm and dry. Neurological:      Mental Status: He is alert and oriented to person, place, and time.            Diagnostic Study Results     Labs -     Recent Results (from the past 12 hour(s))   SARS-COV-2    Collection Time: 01/09/21  2:30 PM   Result Value Ref Range    Specimen source Nasopharyngeal      SARS-CoV-2 PENDING     SARS-CoV-2 PENDING     Specimen source Nasopharyngeal      COVID-19 rapid test PENDING     Specimen type NP Swab      Health status PENDING     COVID-19 PENDING        Radiologic Studies -   No orders to display     CT Results  (Last 48 hours)    None        CXR Results  (Last 48 hours)    None            Medical Decision Making   I am the first provider for this patient. I reviewed the vital signs, available nursing notes, past medical history, past surgical history, family history and social history. Vital Signs-Reviewed the patient's vital signs. Records Reviewed: Nursing Notes    Disposition:  Discharged    DISCHARGE NOTE:   3:15p      Care plan outlined and precautions discussed. Patient has no new complaints, changes, or physical findings. All medications were reviewed with the patient; will d/c home. All of pt's questions and concerns were addressed. Patient was instructed and agrees to follow up with PCP prn, as well as to return to the ED upon further deterioration. Patient is ready to go home. Follow-up Information     Follow up With Specialties Details Why Contact Info    Stan Rivas MD Family Medicine In 1 week As needed [facility] [address]  [phone]            There are no discharge medications for this patient. Provider Notes (Medical Decision Making):   Patient presents with cough and nausea since yesterday. While these are vague symptoms cannot rule out COVID-19. Patient advised to be evaluated by his employer so we will get Covid swab and patient given instructions on isolation and when he can return to work. Other DDx to include, URI, gastritis, viral illness, influenza      Procedures:  Procedures    Please note that this dictation was completed with Dragon, computer voice recognition software. Quite often unanticipated grammatical, syntax, homophones, and other interpretive errors are inadvertently transcribed by the computer software. Please disregard these errors.   Additionally, please excuse any errors that have escaped final proofreading. Diagnosis     Clinical Impression:   1.  Person under investigation for COVID-19

## 2021-01-09 NOTE — Clinical Note
[address] 
[facility] EMERGENCY DEPT 
[address] [facility] [address] 
[phone] Work/School Note Date: 1/9/2021 To Whom It May concern: 
 
Dom Alfonso was evaulated by the following provider(s): 
Attending Provider: Shahid Randhawa MD 
Physician Assistant: Kim Chino virus is suspected. Per the CDC guidelines we recommend home isolation until the following conditions are all met: 1. At least 10 days have passed since symptoms first appeared and 2. At least 24 hours have passed since last fever without the use of fever-reducing medications and 
3. Symptoms (e.g., cough, shortness of breath) have improved Sincerely, David Larios PA-C

## 2021-01-09 NOTE — LETTER
[address] 
[facility] EMERGENCY DEPT 
[address] [address] 
[phone] Work/School Note Date: 1/9/2021 To Whom It May concern: 
 
Elijah Dolan was evaulated by the following  
provider(s): 
Attending Provider: Chad Dean MD 
Physician Assistant: Christy Stack virus has not been ruled out and pt is awaiting results. If negative pt may return to work on 1/12/21. Otherwise, if positive pt must follow guidelines below Per the CDC guidelines we recommend home isolation until the following conditions are all met: 1. At least 10 days have passed since symptoms first appeared and 2. At least 24 hours have passed since last fever without the use of fever-reducing medications and 
3. Symptoms (e.g., cough, shortness of breath) have improved Sincerely, Minesh Raza PA-C

## 2021-01-10 ENCOUNTER — PATIENT OUTREACH (OUTPATIENT)
Dept: CASE MANAGEMENT | Age: 61
End: 2021-01-10

## 2021-01-10 LAB
COVID-19, XGCOVT: NOT DETECTED
HEALTH STATUS, XMCV2T: NORMAL
SOURCE, COVRS: NORMAL
SPECIMEN SOURCE, FCOV2M: NORMAL
SPECIMEN TYPE, XMCV1T: NORMAL

## 2021-01-11 NOTE — PROGRESS NOTES
Patient contacted regarding [facility]. Discussed COVID-19 related testing which was available at this time. Test results were negative. Patient informed of results, if available? yes     Care Transition Nurse/ Ambulatory Care Manager contacted the patient by telephone to perform post discharge assessment. Call within 2 business days of discharge: Yes Verified name and  with patient as identifiers. Provided introduction to self, and explanation of the CTN/ACM role, and reason for call due to risk factors for infection and/or exposure to COVID-19. Symptoms reviewed with patient who verbalized the following symptoms: no new symptoms and no worsening symptoms      Due to no new or worsening symptoms encounter was not routed to provider for escalation. Discussed follow-up appointments. If no appointment was previously scheduled, appointment scheduling offered:  [facility] follow up appointment(s): No future appointments. Non-[facility] follow up appointment(s): Patient will follow up with PCP as needed. Advance Care Planning:   Does patient have an Advance Directive:  decision maker updated. Patient has following risk factors of: no known risk factors. CTN/ACM reviewed discharge instructions, medical action plan and red flags such as increased shortness of breath, increasing fever and signs of decompensation with patient who verbalized understanding. Discussed exposure protocols and quarantine with CDC Guidelines What to do if you are sick with coronavirus disease .  Patient was given an opportunity for questions and concerns. The patient agrees to contact the [facility] exposure line [phone], [facility] [address]  ([phone] and PCP office for questions related to their healthcare. CTN/ACM provided contact information for future needs.     Reviewed and educated patient on any new and changed medications related to discharge diagnosis     Patient/family/caregiver given information for GetWell Loop and agrees to enroll no  Patient's preferred e-mail: n/a   Patient's preferred phone number: n/a  Based on Loop alert triggers, patient will be contacted by nurse care manager for worsening symptoms. Plan for follow-up call in 7-14 days based on severity of symptoms and risk factors.

## 2021-01-11 NOTE — ACP (ADVANCE CARE PLANNING)
Advance Care Planning   Healthcare Decision Maker:    Elijah Felipe      Click here to complete [address] including selection of the Healthcare Decision Maker Relationship (ie \"Primary\")

## 2021-01-16 ENCOUNTER — PATIENT OUTREACH (OUTPATIENT)
Dept: CASE MANAGEMENT | Age: 61
End: 2021-01-16

## 2021-01-16 NOTE — PROGRESS NOTES
Patient contacted regarding COVID-19 risk and screening. Discussed COVID-19 related testing which was available at this time. Test results were negative. Care Transition Nurse/ Ambulatory Care Manager/ LPN Care Coordinator contacted the patient by telephone to perform follow-up assessment. Verified name and  with patient as identifiers. Patient has following risk factors of: no known risk factors. Symptoms reviewed with patient who verbalized the following symptoms: no worsening symptoms. Due to no new or worsening symptoms encounter was not routed to provider for escalation. Education provided regarding infection prevention, and signs and symptoms of COVID-19 and when to seek medical attention with patient who verbalized understanding. Discussed exposure protocols and quarantine from [address] you at higher risk for severe illness  and given an opportunity for questions and concerns. The patient agrees to contact the COVID-19 hotline [phone] or PCP office for questions related to their healthcare. CTN/ACM/LPN provided contact information for future reference. From CDC: Are you at higher risk for severe illness?  Wash your hands often.  Avoid close contact (6 feet, which is about two arm lengths) with people who are sick.  Put distance between yourself and other people if COVID-19 is spreading in your community.  Clean and disinfect frequently touched surfaces.  Avoid all cruise travel and non-essential air travel.  Call your healthcare professional if you have concerns about COVID-19 and your underlying condition or if you are sick. For more information on steps you can take to protect yourself, see CDC's How to Gaby for follow-up call in 7-14 days based on severity of symptoms and risk factors.

## 2021-02-02 ENCOUNTER — PATIENT OUTREACH (OUTPATIENT)
Dept: CASE MANAGEMENT | Age: 61
End: 2021-02-02

## 2021-02-02 NOTE — PROGRESS NOTES
Patient resolved from [address] Transitions episode on 2/2/21  Discussed COVID-19 related testing which was available at this time. Test results were negative. Patient informed of results, if available? no     Patient/family has been provided the following resources and education related to COVID-19:                         Signs, symptoms and red flags related to COVID-19            [facility] exposure and quarantine guidelines            [facility] exposure contact - [phone]            Contact for their local Department of Health                 Patient currently reports that the following symptoms have improved:  no new symptoms and no worsening symptoms. No further outreach scheduled with this CTN/ACM/LPN/HC/ MA. Episode of Care resolved. Patient has this CTN/ACM/LPN/HC/MA contact information if future needs arise.

## 2021-03-12 ENCOUNTER — HOSPITAL ENCOUNTER (EMERGENCY)
Age: 61
Discharge: HOME | End: 2021-03-12
Attending: EMERGENCY MEDICINE
Payer: COMMERCIAL

## 2021-03-12 VITALS
WEIGHT: 244 LBS | SYSTOLIC BLOOD PRESSURE: 156 MMHG | HEIGHT: 72 IN | HEART RATE: 97 BPM | BODY MASS INDEX: 33.05 KG/M2 | TEMPERATURE: 98.8 F | OXYGEN SATURATION: 100 % | DIASTOLIC BLOOD PRESSURE: 90 MMHG | RESPIRATION RATE: 20 BRPM

## 2021-03-12 DIAGNOSIS — L02.412 ABSCESS OF LEFT AXILLA: Primary | ICD-10-CM

## 2021-03-12 PROCEDURE — 74011250637 HC RX REV CODE- 250/637: Performed by: NURSE PRACTITIONER

## 2021-03-12 PROCEDURE — 75810000289 HC I&D ABSCESS SIMP/COMP/MULT

## 2021-03-12 PROCEDURE — 99283 EMERGENCY DEPT VISIT LOW MDM: CPT

## 2021-03-12 PROCEDURE — 74011000250 HC RX REV CODE- 250: Performed by: NURSE PRACTITIONER

## 2021-03-12 RX ORDER — IBUPROFEN 800 MG/1
800 TABLET ORAL
Qty: 20 TAB | Refills: 0 | Status: SHIPPED | OUTPATIENT
Start: 2021-03-12 | End: 2021-03-22

## 2021-03-12 RX ORDER — CEPHALEXIN 500 MG/1
500 CAPSULE ORAL 4 TIMES DAILY
Qty: 28 CAP | Refills: 0 | Status: SHIPPED | OUTPATIENT
Start: 2021-03-12 | End: 2021-03-22

## 2021-03-12 RX ORDER — IBUPROFEN 400 MG/1
800 TABLET ORAL
Status: COMPLETED | OUTPATIENT
Start: 2021-03-12 | End: 2021-03-12

## 2021-03-12 RX ORDER — LIDOCAINE HYDROCHLORIDE 20 MG/ML
10 INJECTION, SOLUTION INFILTRATION; PERINEURAL
Status: COMPLETED | OUTPATIENT
Start: 2021-03-12 | End: 2021-03-12

## 2021-03-12 RX ORDER — DOXYCYCLINE HYCLATE 100 MG
100 TABLET ORAL 2 TIMES DAILY
Qty: 20 TAB | Refills: 0 | Status: SHIPPED | OUTPATIENT
Start: 2021-03-12 | End: 2021-03-22

## 2021-03-12 RX ADMIN — LIDOCAINE HYDROCHLORIDE 200 MG: 20 INJECTION, SOLUTION INFILTRATION; PERINEURAL at 11:11

## 2021-03-12 RX ADMIN — IBUPROFEN 800 MG: 400 TABLET, FILM COATED ORAL at 11:01

## 2021-03-12 NOTE — ED NOTES
Patient is alert and oriented x 4 and in no acute distress at this time. Respirations are at a regular rate, depth, and pattern. Patient updated on plan of care and has no questions or concerns at this time. Call bell within reach. Will continue to monitor. Please reference nursing assessment. Emergency Department Nursing Plan of Care       The Nursing Plan of Care is developed from the Nursing assessment and Emergency Department Attending provider initial evaluation. The plan of care may be reviewed in the ED Provider note.     The Plan of Care was developed with the following considerations:   Patient / Family readiness to learn indicated by:verbalized understanding and successful return demonstration  Persons(s) to be included in education: patient  Barriers to Learning/Limitations:No    Signed     Tim Dudley RN    3/12/2021  10:15 AM

## 2021-03-12 NOTE — LETTER
[address] EMERGENCY DEPT 
[address] [facility] [place] [address] 
[phone] Work/School Note Date: 3/12/2021 To Whom It May concern: 
 
Otto Braun was seen and treated today in the emergency room by the following provider(s): 
Attending Provider: Trixie Aguilera MD 
Nurse Practitioner: Justin Spann NP. Otto Braun may return to work on 3-15. Sincerely, Tessy Pleitez NP

## 2021-03-12 NOTE — ED NOTES
Patient (s)  given copy of dc instructions and  paper script(s) and 3 electronic scripts. Patient (s)  verbalized understanding of instructions and script (s). Patient given a current medication reconciliation form and verbalized understanding of their medications. Patient (s) verbalized understanding of the importance of discussing medications with  his or her physician or clinic they will be following up with. Patient alert and oriented and in no acute distress. Patient offered wheelchair from treatment area to hospital entrance, patient declined wheelchair. REPORT RECEIVED FROM GEORGIA ROMERO

PATIENT RESTING COMFORTABLY IN BED W/  AT BEDSIDE

 APPEARS WELL & VITALS STABLE

REPORTS CONTINUED FLANK PAIN RATED AT 1/10-DEFERRING MEDICATION

UPDATED ON POC

CALL [facility] W/IN REACH

## 2021-03-12 NOTE — ED PROVIDER NOTES
EMERGENCY DEPARTMENT HISTORY AND PHYSICAL EXAM    Date: 3/12/2021  Patient Name: Sal Keen    History of Presenting Illness     Chief Complaint   Patient presents with    Abscess         History Provided By: Patient    Chief Complaint: skin problem  Duration: onset 3 days ago   Timing:  Acute  Location: left axilla  Quality: tenderness  Severity: 10 out of 10  Modifying Factors: none  Associated Symptoms: denies any other associated signs or symptoms      HPI: Sal Keen is a 64 y.o. male with a PMH of GERD who presents with abscess left axilla acute onset 3 days ago. Patient states it is painful when he touches it. He denies drainage. He denies history of hidradenitis or abscesses. He denies fever. PCP: Rui Norton MD    Current Outpatient Medications   Medication Sig Dispense Refill    ibuprofen (MOTRIN) 800 mg tablet Take 1 Tab by mouth every six (6) hours as needed for Pain for up to 7 days. 20 Tab 0    doxycycline (VIBRA-TABS) 100 mg tablet Take 1 Tab by mouth two (2) times a day for 10 days. 20 Tab 0    cephALEXin (Keflex) 500 mg capsule Take 1 Cap by mouth four (4) times daily for 7 days. 28 Cap 0       Past History     Past Medical History:  Past Medical History:   Diagnosis Date    Gastrointestinal disorder     GERDS    GERD (gastroesophageal reflux disease)     Ill-defined condition     MRSA       Past Surgical History:  Past Surgical History:   Procedure Laterality Date    HX APPENDECTOMY      HX HEENT      right eye surgery    HX OTHER SURGICAL      right eye surgery    HX UROLOGICAL      laser surgery on \"my private parts\"       Family History:  No family history on file.     Social History:  Social History     Tobacco Use    Smoking status: Never Smoker    Smokeless tobacco: Never Used   Substance Use Topics    Alcohol use: Yes     Comment: wine coolers    Drug use: Yes     Types: Marijuana       Allergies:  No Known Allergies      Review of Systems   Review of Systems Constitutional: Negative for chills, fatigue and fever. HENT: Negative for congestion and sore throat. Eyes: Negative for redness. Respiratory: Negative for cough, chest tightness and wheezing. Cardiovascular: Negative for chest pain. Gastrointestinal: Negative for abdominal pain. Genitourinary: Negative for dysuria. Musculoskeletal: Negative for arthralgias, back pain, myalgias, neck pain and neck stiffness. Skin: Negative for rash. abscess   Neurological: Negative for dizziness, syncope, weakness, light-headedness, numbness and headaches. All other systems reviewed and are negative. Physical Exam     Vitals:    03/12/21 0951   BP: (!) 156/90   Pulse: 97   Resp: 20   Temp: 98.8 °F (37.1 °C)   SpO2: 100%   Weight: 110.7 kg (244 lb)   Height: 6' (1.829 m)     Physical Exam  Vitals signs and nursing note reviewed. Constitutional:       Appearance: He is well-developed. HENT:      Head: Normocephalic and atraumatic. Right Ear: External ear normal.      Left Ear: External ear normal.   Eyes:      General:         Right eye: No discharge. Left eye: No discharge. Conjunctiva/sclera: Conjunctivae normal.   Neck:      Musculoskeletal: Normal range of motion and neck supple. Cardiovascular:      Rate and Rhythm: Normal rate and regular rhythm. Heart sounds: Normal heart sounds. Pulmonary:      Effort: Pulmonary effort is normal. No respiratory distress. Breath sounds: Normal breath sounds. No wheezing. Abdominal:      General: Bowel sounds are normal.      Palpations: Abdomen is soft. Tenderness: There is no abdominal tenderness. Musculoskeletal: Normal range of motion. Lymphadenopathy:      Cervical: No cervical adenopathy. Skin:     General: Skin is warm and dry. Comments: 4 cm fluctuant lesion left axilla   Neurological:      Mental Status: He is alert and oriented to person, place, and time.       Cranial Nerves: No cranial nerve deficit. Psychiatric:         Behavior: Behavior normal.         Thought Content: Thought content normal.         Judgment: Judgment normal.           Diagnostic Study Results     Labs -   No results found for this or any previous visit (from the past 12 hour(s)). Radiologic Studies -   No orders to display     CT Results  (Last 48 hours)    None        CXR Results  (Last 48 hours)    None            Medical Decision Making   I am the first provider for this patient. I reviewed the vital signs, available nursing notes, past medical history, past surgical history, family history and social history. Vital Signs-Reviewed the patient's vital signs. Records Reviewed: Nursing Notes    Provider Notes (Medical Decision Making):   DDX abscess  hidradenitis cellulitis folliculitis          Disposition:  Home    DISCHARGE NOTE:           Care plan outlined and precautions discussed. Patient has no new complaints, changes, or physical findings. . All medications were reviewed with the patient; will d/c home with doxycycline Keflex Motrin. All of pt's questions and concerns were addressed. Patient was instructed and agrees to follow up with this department in 2 days for wound recheck, as well as to return to the ED upon further deterioration. Patient is ready to go home. Follow-up Information     Follow up With Specialties Details Why [address] [facility] [place] EMERGENCY DEPT Emergency Medicine In 2 days For wound re-check [address] [facility] [address]          Discharge Medication List as of 3/12/2021 11:34 AM      START taking these medications    Details   ibuprofen (MOTRIN) 800 mg tablet Take 1 Tab by mouth every six (6) hours as needed for Pain for up to 7 days. , Normal, Disp-20 Tab, R-0      doxycycline (VIBRA-TABS) 100 mg tablet Take 1 Tab by mouth two (2) times a day for 10 days. , Normal, Disp-20 Tab, R-0      cephALEXin (Keflex) 500 mg capsule Take 1 Cap by mouth four (4) times daily for 7 days. , Normal, Disp-28 Cap, R-0             Procedures:  I&D Abcess Complex    Date/Time: 3/12/2021 7:19 PM  Performed by: Ivy Garcia NP  Authorized by: Ivy Garcia NP     Consent:     Consent obtained:  Verbal and written    Consent given by:  Patient    Risks discussed:  Bleeding and pain    Alternatives discussed: n/a. Location:     Type:  Abscess    Size:  4 cm    Location: Left axilla. Pre-procedure details:     Skin preparation:  Betadine  Anesthesia (see MAR for exact dosages): Anesthesia method:  Local infiltration    Local anesthetic:  Lidocaine 2% w/o epi  Procedure type:     Complexity:  Complex  Procedure details:     Needle aspiration: no      Incision types:  Single straight    Incision depth:  Subcutaneous    Scalpel blade:  11    Wound management:  Probed and deloculated    Drainage:  Purulent and bloody    Drainage amount: Moderate    Wound treatment:  Wound left open    Packing materials:  1/4 in iodoform gauze    Amount 1/4\" iodoform:  4cm  Post-procedure details:     Patient tolerance of procedure: Tolerated well, no immediate complications        Please note that this dictation was completed with Dragon, computer voice recognition software. Quite often unanticipated grammatical, syntax, homophones, and other interpretive errors are inadvertently transcribed by the computer software. Please disregard these errors. Additionally, please excuse any errors that have escaped final proofreading. Diagnosis     Clinical Impression:   1.  Abscess of left axilla

## 2021-03-16 ENCOUNTER — HOSPITAL ENCOUNTER (EMERGENCY)
Age: 61
Discharge: OTHER INSTITUTION | End: 2021-03-17
Attending: EMERGENCY MEDICINE
Payer: COMMERCIAL

## 2021-03-16 ENCOUNTER — APPOINTMENT (OUTPATIENT)
Dept: GENERAL RADIOLOGY | Age: 61
End: 2021-03-16
Attending: EMERGENCY MEDICINE
Payer: COMMERCIAL

## 2021-03-16 DIAGNOSIS — J12.82 PNEUMONIA DUE TO COVID-19 VIRUS: ICD-10-CM

## 2021-03-16 DIAGNOSIS — J96.01 ACUTE RESPIRATORY FAILURE WITH HYPOXIA (HCC): Primary | ICD-10-CM

## 2021-03-16 DIAGNOSIS — U07.1 PNEUMONIA DUE TO COVID-19 VIRUS: ICD-10-CM

## 2021-03-16 LAB
ALBUMIN SERPL-MCNC: 3.3 G/DL (ref 3.5–5)
ALBUMIN/GLOB SERPL: 0.7 {RATIO} (ref 1.1–2.2)
ALP SERPL-CCNC: 45 U/L (ref 45–117)
ALT SERPL-CCNC: 60 U/L (ref 12–78)
ANION GAP SERPL CALC-SCNC: 6 MMOL/L (ref 5–15)
AST SERPL-CCNC: 85 U/L (ref 15–37)
BASOPHILS # BLD: 0 K/UL (ref 0–0.1)
BASOPHILS NFR BLD: 0 % (ref 0–1)
BILIRUB SERPL-MCNC: 1 MG/DL (ref 0.2–1)
BNP SERPL-MCNC: 11 PG/ML (ref 0–125)
BUN SERPL-MCNC: 11 MG/DL (ref 6–20)
BUN/CREAT SERPL: 10 (ref 12–20)
CALCIUM SERPL-MCNC: 8.9 MG/DL (ref 8.5–10.1)
CHLORIDE SERPL-SCNC: 102 MMOL/L (ref 97–108)
CO2 SERPL-SCNC: 25 MMOL/L (ref 21–32)
CREAT SERPL-MCNC: 1.14 MG/DL (ref 0.7–1.3)
D DIMER PPP FEU-MCNC: 0.65 MG/L FEU (ref 0–0.65)
DIFFERENTIAL METHOD BLD: ABNORMAL
EOSINOPHIL # BLD: 0 K/UL (ref 0–0.4)
EOSINOPHIL NFR BLD: 0 % (ref 0–7)
ERYTHROCYTE [DISTWIDTH] IN BLOOD BY AUTOMATED COUNT: 13.2 % (ref 11.5–14.5)
FIBRINOGEN PPP-MCNC: 500 MG/DL (ref 200–475)
GLOBULIN SER CALC-MCNC: 4.9 G/DL (ref 2–4)
GLUCOSE SERPL-MCNC: 108 MG/DL (ref 65–100)
HCT VFR BLD AUTO: 40 % (ref 36.6–50.3)
HGB BLD-MCNC: 14.5 G/DL (ref 12.1–17)
IMM GRANULOCYTES # BLD AUTO: 0 K/UL (ref 0–0.04)
IMM GRANULOCYTES NFR BLD AUTO: 1 % (ref 0–0.5)
LACTATE SERPL-SCNC: 1.9 MMOL/L (ref 0.4–2)
LYMPHOCYTES # BLD: 1.6 K/UL (ref 0.8–3.5)
LYMPHOCYTES NFR BLD: 25 % (ref 12–49)
MAGNESIUM SERPL-MCNC: 2.2 MG/DL (ref 1.6–2.4)
MCH RBC QN AUTO: 30.9 PG (ref 26–34)
MCHC RBC AUTO-ENTMCNC: 36.3 G/DL (ref 30–36.5)
MCV RBC AUTO: 85.3 FL (ref 80–99)
MONOCYTES # BLD: 0.9 K/UL (ref 0–1)
MONOCYTES NFR BLD: 14 % (ref 5–13)
NEUTS SEG # BLD: 3.9 K/UL (ref 1.8–8)
NEUTS SEG NFR BLD: 60 % (ref 32–75)
NRBC # BLD: 0 K/UL (ref 0–0.01)
NRBC BLD-RTO: 0 PER 100 WBC
PLATELET # BLD AUTO: 162 K/UL (ref 150–400)
PMV BLD AUTO: 11 FL (ref 8.9–12.9)
POTASSIUM SERPL-SCNC: 4.1 MMOL/L (ref 3.5–5.1)
PROCALCITONIN SERPL-MCNC: <0.05 NG/ML
PROT SERPL-MCNC: 8.2 G/DL (ref 6.4–8.2)
RBC # BLD AUTO: 4.69 M/UL (ref 4.1–5.7)
SODIUM SERPL-SCNC: 133 MMOL/L (ref 136–145)
TROPONIN I SERPL-MCNC: <0.05 NG/ML
WBC # BLD AUTO: 6.4 K/UL (ref 4.1–11.1)

## 2021-03-16 PROCEDURE — 96374 THER/PROPH/DIAG INJ IV PUSH: CPT

## 2021-03-16 PROCEDURE — 87040 BLOOD CULTURE FOR BACTERIA: CPT

## 2021-03-16 PROCEDURE — 71045 X-RAY EXAM CHEST 1 VIEW: CPT

## 2021-03-16 PROCEDURE — 96375 TX/PRO/DX INJ NEW DRUG ADDON: CPT

## 2021-03-16 PROCEDURE — 83605 ASSAY OF LACTIC ACID: CPT

## 2021-03-16 PROCEDURE — 83880 ASSAY OF NATRIURETIC PEPTIDE: CPT

## 2021-03-16 PROCEDURE — 85025 COMPLETE CBC W/AUTO DIFF WBC: CPT

## 2021-03-16 PROCEDURE — 77010033678 HC OXYGEN DAILY

## 2021-03-16 PROCEDURE — 84145 PROCALCITONIN (PCT): CPT

## 2021-03-16 PROCEDURE — 85379 FIBRIN DEGRADATION QUANT: CPT

## 2021-03-16 PROCEDURE — 99291 CRITICAL CARE FIRST HOUR: CPT

## 2021-03-16 PROCEDURE — 85384 FIBRINOGEN ACTIVITY: CPT

## 2021-03-16 PROCEDURE — 83735 ASSAY OF MAGNESIUM: CPT

## 2021-03-16 PROCEDURE — 36415 COLL VENOUS BLD VENIPUNCTURE: CPT

## 2021-03-16 PROCEDURE — 84484 ASSAY OF TROPONIN QUANT: CPT

## 2021-03-16 PROCEDURE — 74011250636 HC RX REV CODE- 250/636: Performed by: EMERGENCY MEDICINE

## 2021-03-16 PROCEDURE — 74011000250 HC RX REV CODE- 250: Performed by: EMERGENCY MEDICINE

## 2021-03-16 PROCEDURE — 80053 COMPREHEN METABOLIC PANEL: CPT

## 2021-03-16 PROCEDURE — 74011250637 HC RX REV CODE- 250/637: Performed by: EMERGENCY MEDICINE

## 2021-03-16 RX ORDER — ACETAMINOPHEN 500 MG
1000 TABLET ORAL ONCE
Status: COMPLETED | OUTPATIENT
Start: 2021-03-16 | End: 2021-03-16

## 2021-03-16 RX ORDER — KETOROLAC TROMETHAMINE 30 MG/ML
15 INJECTION, SOLUTION INTRAMUSCULAR; INTRAVENOUS
Status: COMPLETED | OUTPATIENT
Start: 2021-03-16 | End: 2021-03-16

## 2021-03-16 RX ORDER — DEXAMETHASONE SODIUM PHOSPHATE 100 MG/10ML
10 INJECTION INTRAMUSCULAR; INTRAVENOUS
Status: COMPLETED | OUTPATIENT
Start: 2021-03-16 | End: 2021-03-16

## 2021-03-16 RX ADMIN — CEFTRIAXONE SODIUM 1 G: 1 INJECTION, POWDER, FOR SOLUTION INTRAMUSCULAR; INTRAVENOUS at 19:59

## 2021-03-16 RX ADMIN — SODIUM CHLORIDE 500 ML: 9 INJECTION, SOLUTION INTRAVENOUS at 19:52

## 2021-03-16 RX ADMIN — ACETAMINOPHEN 1000 MG: 500 TABLET ORAL at 18:58

## 2021-03-16 RX ADMIN — KETOROLAC TROMETHAMINE 15 MG: 30 INJECTION, SOLUTION INTRAMUSCULAR; INTRAVENOUS at 19:17

## 2021-03-16 RX ADMIN — AZITHROMYCIN MONOHYDRATE 500 MG: 500 INJECTION, POWDER, LYOPHILIZED, FOR SOLUTION INTRAVENOUS at 19:52

## 2021-03-16 RX ADMIN — DEXAMETHASONE SODIUM PHOSPHATE 10 MG: 10 INJECTION INTRAMUSCULAR; INTRAVENOUS at 19:16

## 2021-03-16 NOTE — ED NOTES
Pt presents to ED ambulatory complaining of SOB and cough. Pt reports he started feeling bad over the weekend and tested positive for COVID-19 on 3/14/2021. Pt reports he was recently seen in this ED for an abscess, but reports that he did not have symptoms at that time. Pt now appears dyspenic, labored breathing that increases with any movement. Pt also reports generalized body aches. Pt is alert and oriented x 4, skin is warm, intact, and dry. Assessment completed and pt updated on plan of care. Call bell and urinal in reach. Emergency Department Nursing Plan of Care       The Nursing Plan of Care is developed from the Nursing assessment and Emergency Department Attending provider initial evaluation. The plan of care may be reviewed in the ED Provider note.     The Plan of Care was developed with the following considerations:   Patient / Family readiness to learn indicated by:verbalized understanding  Persons(s) to be included in education: patient  Barriers to Learning/Limitations:No    Signed     Margot Clark RN    3/16/2021   7:38 PM

## 2021-03-16 NOTE — ED NOTES
Pt placed on 2L NC, did not see much of an increase.  Pt placed on 3L NC and at 94%, Provider requested NC goal to be approx 94%

## 2021-03-16 NOTE — ED PROVIDER NOTES
EMERGENCY DEPARTMENT HISTORY AND PHYSICAL EXAM      Date: 3/16/2021  Patient Name: Staci Nj  Patient Age and Sex: 64 y.o. male     History of Presenting Illness     Chief Complaint   Patient presents with    Shortness of Breath    Positive For Covid-19       History Provided By: Patient    HPI: Staci Nj is a 32-year-old male presenting for shortness of breath and COVID-19. Patient states that on Saturday he was here for an abscess under his armpit and was prescribed doxycycline and Keflex and has been taking it as prescribed. Then on Sunday evening started to have body aches and a fever of 100.7 so the next day, Monday, yesterday he went to [place] and was told that he tested positive for COVID-19. Since yesterday he has been continuing to have body aches, nausea, cough, headaches. Patient states that today he felt very short of breath which is what prompted him to come to the ER. Denies a history of smoking, COPD, asthma. There are no other complaints, changes, or physical findings at this time. PCP: Steve Roth MD    No current facility-administered medications on file prior to encounter. Current Outpatient Medications on File Prior to Encounter   Medication Sig Dispense Refill    ibuprofen (MOTRIN) 800 mg tablet Take 1 Tab by mouth every six (6) hours as needed for Pain for up to 7 days. 20 Tab 0    doxycycline (VIBRA-TABS) 100 mg tablet Take 1 Tab by mouth two (2) times a day for 10 days. 20 Tab 0    cephALEXin (Keflex) 500 mg capsule Take 1 Cap by mouth four (4) times daily for 7 days.  28 Cap 0       Past History     Past Medical History:  Past Medical History:   Diagnosis Date    Gastrointestinal disorder     GERDS    GERD (gastroesophageal reflux disease)     Ill-defined condition     MRSA       Past Surgical History:  Past Surgical History:   Procedure Laterality Date    HX APPENDECTOMY      HX HEENT      right eye surgery    HX OTHER SURGICAL      right eye surgery    HX UROLOGICAL      laser surgery on \"my private parts\"       Family History:  History reviewed. No pertinent family history. Social History:  Social History     Tobacco Use    Smoking status: Never Smoker    Smokeless tobacco: Never Used   Substance Use Topics    Alcohol use: Yes     Comment: wine coolers    Drug use: Yes     Types: Marijuana       Allergies:  No Known Allergies      Review of Systems   Review of Systems   Constitutional: Positive for fatigue and fever. Negative for chills. Respiratory: Positive for cough and shortness of breath. Cardiovascular: Negative for chest pain. Gastrointestinal: Positive for nausea. Negative for abdominal pain, constipation, diarrhea and vomiting. Genitourinary: Negative for dysuria, frequency and hematuria. Musculoskeletal: Positive for myalgias. Neurological: Negative for weakness and numbness. All other systems reviewed and are negative. Physical Exam   Physical Exam  Vitals signs and nursing note reviewed. Constitutional:       Appearance: He is well-developed. HENT:      Head: Normocephalic and atraumatic. Nose: Nose normal.      Mouth/Throat:      Mouth: Mucous membranes are moist.   Eyes:      Extraocular Movements: Extraocular movements intact. Conjunctiva/sclera: Conjunctivae normal.   Neck:      Musculoskeletal: Normal range of motion and neck supple. Cardiovascular:      Rate and Rhythm: Normal rate and regular rhythm. Pulmonary:      Effort: Pulmonary effort is normal. No respiratory distress. Breath sounds: Normal breath sounds. Comments: Patient has slight tachypnea but not in any distress. Lung auscultation deferred secondary to pandemic  Abdominal:      General: There is no distension. Palpations: Abdomen is soft. Tenderness: There is no abdominal tenderness. Musculoskeletal: Normal range of motion. Skin:     General: Skin is warm and dry.    Neurological:      General: No focal deficit present. Mental Status: He is alert and oriented to person, place, and time. Mental status is at baseline. Psychiatric:         Mood and Affect: Mood normal.          Diagnostic Study Results     Labs -     Recent Results (from the past 12 hour(s))   CBC WITH AUTOMATED DIFF    Collection Time: 03/16/21  7:36 PM   Result Value Ref Range    WBC 6.4 4.1 - 11.1 K/uL    RBC 4.69 4.10 - 5.70 M/uL    HGB 14.5 12.1 - 17.0 g/dL    HCT 40.0 36.6 - 50.3 %    MCV 85.3 80.0 - 99.0 FL    MCH 30.9 26.0 - 34.0 PG    MCHC 36.3 30.0 - 36.5 g/dL    RDW 13.2 11.5 - 14.5 %    PLATELET 897 261 - 720 K/uL    MPV 11.0 8.9 - 12.9 FL    NRBC 0.0 0  WBC    ABSOLUTE NRBC 0.00 0.00 - 0.01 K/uL    NEUTROPHILS 60 32 - 75 %    LYMPHOCYTES 25 12 - 49 %    MONOCYTES 14 (H) 5 - 13 %    EOSINOPHILS 0 0 - 7 %    BASOPHILS 0 0 - 1 %    IMMATURE GRANULOCYTES 1 (H) 0.0 - 0.5 %    ABS. NEUTROPHILS 3.9 1.8 - 8.0 K/UL    ABS. LYMPHOCYTES 1.6 0.8 - 3.5 K/UL    ABS. MONOCYTES 0.9 0.0 - 1.0 K/UL    ABS. EOSINOPHILS 0.0 0.0 - 0.4 K/UL    ABS. BASOPHILS 0.0 0.0 - 0.1 K/UL    ABS. IMM. GRANS. 0.0 0.00 - 0.04 K/UL    DF AUTOMATED     METABOLIC PANEL, COMPREHENSIVE    Collection Time: 03/16/21  7:36 PM   Result Value Ref Range    Sodium 133 (L) 136 - 145 mmol/L    Potassium 4.1 3.5 - 5.1 mmol/L    Chloride 102 97 - 108 mmol/L    CO2 25 21 - 32 mmol/L    Anion gap 6 5 - 15 mmol/L    Glucose 108 (H) 65 - 100 mg/dL    BUN 11 6 - 20 MG/DL    Creatinine 1.14 0.70 - 1.30 MG/DL    BUN/Creatinine ratio 10 (L) 12 - 20      GFR est AA >60 >60 ml/min/1.73m2    GFR est non-AA >60 >60 ml/min/1.73m2    Calcium 8.9 8.5 - 10.1 MG/DL    Bilirubin, total 1.0 0.2 - 1.0 MG/DL    ALT (SGPT) 60 12 - 78 U/L    AST (SGOT) 85 (H) 15 - 37 U/L    Alk.  phosphatase 45 45 - 117 U/L    Protein, total 8.2 6.4 - 8.2 g/dL    Albumin 3.3 (L) 3.5 - 5.0 g/dL    Globulin 4.9 (H) 2.0 - 4.0 g/dL    A-G Ratio 0.7 (L) 1.1 - 2.2     LACTIC ACID    Collection Time: 03/16/21  7:36 PM   Result Value Ref Range    Lactic acid 1.9 0.4 - 2.0 MMOL/L   TROPONIN I    Collection Time: 03/16/21  7:36 PM   Result Value Ref Range    Troponin-I, Qt. <0.05 <0.05 ng/mL   NT-PRO BNP    Collection Time: 03/16/21  7:36 PM   Result Value Ref Range    NT pro-BNP 11 0 - 125 PG/ML   D DIMER    Collection Time: 03/16/21  7:36 PM   Result Value Ref Range    D-dimer 0.65 0.00 - 0.65 mg/L FEU   MAGNESIUM    Collection Time: 03/16/21  7:36 PM   Result Value Ref Range    Magnesium 2.2 1.6 - 2.4 mg/dL       Radiologic Studies -   XR CHEST PORT   Final Result   Patchy airspace disease. CT Results  (Last 48 hours)    None        CXR Results  (Last 48 hours)               03/16/21 1848  XR CHEST PORT Final result    Impression:  Patchy airspace disease. Narrative:  EXAM: Portable CXR. 1843 hours. INDICATION: covid pos with sob       COMPARISON: None. FINDINGS:   The lungs show bilateral lower lobe predominant patchy airspace disease,   nonspecific but compatible with Covid pneumonia. Heart is normal in size. There is no pulmonary edema. There is no evident   pneumothorax or pleural effusion. Medical Decision Making   I am the first provider for this patient. I reviewed the vital signs, available nursing notes, past medical history, past surgical history, family history and social history. Vital Signs-Reviewed the patient's vital signs.   Patient Vitals for the past 12 hrs:   Temp Pulse Resp BP SpO2   03/16/21 2119 98.2 °F (36.8 °C) -- -- -- --   03/16/21 2100 -- 100 (!) 31 (!) 135/92 95 %   03/16/21 2030 -- 100 23 (!) 145/91 96 %   03/16/21 2000 -- (!) 105 20 (!) 133/94 95 %   03/16/21 1940 -- (!) 113 23 -- 97 %   03/16/21 1925 -- (!) 107 28 -- 97 %   03/16/21 1845 -- -- -- -- 93 %   03/16/21 1828 99.7 °F (37.6 °C) (!) 118 20 (!) 151/100 90 %       Records Reviewed: Nursing Notes and Old Medical Records    Provider Notes (Medical Decision Making):   Patient presenting with cough and shortness of breath. Differential includes COVID-19 infection, bronchitis, pneumonia. Less likely PE or ACS. We will continue to monitor his vitals and check labs and chest x-ray. ED Course:   Initial assessment performed. The patients presenting problems have been discussed, and they are in agreement with the care plan formulated and outlined with them. I have encouraged them to ask questions as they arise throughout their visit. ED Course as of Mar 16 2124   Tue Mar 16, 2021   2050 Spoke with Dr. Saleem Kwong, [address] [place] [address] hospitalist who looked at patient's chest x-ray and was concerned that given the way it looked the patient has a high chance of decompensation. Even though he is only requiring 3 L nasal cannula and respiratory rate is controlled, he is concerned that patient might need to be transferred in the near future. He asked that we transfer this patient to either [facility] or [facility].    [JS]   2119 Spoke with Dr. Terra Aden , hospitalist who agrees with transfer and admission at [facility]. Updated patient who is eating food at the side of the bed in no distress and his wife on the phone. [JS]      ED Course User Index  [JS] Rhett Fiore MD     Critical Care Time:   CRITICAL CARE NOTE :    9:24 PM  IMPENDING DETERIORATION -Airway, Respiratory and Cardiovascular  ASSOCIATED RISK FACTORS - Hypotension, Shock and Hypoxia  MANAGEMENT- Bedside Assessment and Supervision of Care, transfer  INTERPRETATION -  Xrays, ECG, Blood Pressure and Cardiac Output Measures   INTERVENTIONS - vent mngmt  CASE REVIEW - Hospitalist/Intensivist, Nursing and Family  TREATMENT RESPONSE -improved  PERFORMED BY - Self    NOTES   :    I, Keeley Gillespie, have spent 40 minutes of critical care time involved in lab review, consultations with specialist, family decision- making, bedside attention and documentation. This time excludes time spent in any separate billed procedures.  During this entire length of time I was immediately available to the patient. Disposition:  Transfer Note:   Patient is being transferred to [facility] Med tele. Transfer was accepted by Dr. Jarred Meza. The reasons for their transfer have been discussed with them and available family. They convey agreement and understanding for the need to be transferred as explained to them by me. Diagnosis     Clinical Impression:   1. Acute respiratory failure with hypoxia ([address]    2. Pneumonia due to COVID-19 virus        Attestations:    Esvin Herman M.D. Please note that this dictation was completed with [employer], the computer voice recognition software. Quite often unanticipated grammatical, syntax, homophones, and other interpretive errors are inadvertently transcribed by the computer software. Please disregard these errors. Please excuse any errors that have escaped final proofreading. Thank you.

## 2021-03-17 ENCOUNTER — HOSPITAL ENCOUNTER (INPATIENT)
Age: 61
LOS: 5 days | Discharge: HOME | DRG: 177 | End: 2021-03-22
Attending: INTERNAL MEDICINE | Admitting: INTERNAL MEDICINE
Payer: COMMERCIAL

## 2021-03-17 VITALS
RESPIRATION RATE: 24 BRPM | WEIGHT: 244 LBS | HEART RATE: 93 BPM | OXYGEN SATURATION: 96 % | TEMPERATURE: 98.1 F | HEIGHT: 72 IN | BODY MASS INDEX: 33.05 KG/M2 | SYSTOLIC BLOOD PRESSURE: 117 MMHG | DIASTOLIC BLOOD PRESSURE: 87 MMHG

## 2021-03-17 PROBLEM — U07.1 COVID-19: Status: ACTIVE | Noted: 2021-03-17

## 2021-03-17 LAB
ALBUMIN SERPL-MCNC: 3 G/DL (ref 3.5–5)
ALBUMIN/GLOB SERPL: 0.6 {RATIO} (ref 1.1–2.2)
ALP SERPL-CCNC: 43 U/L (ref 45–117)
ALT SERPL-CCNC: 58 U/L (ref 12–78)
ANION GAP SERPL CALC-SCNC: 7 MMOL/L (ref 5–15)
AST SERPL-CCNC: 72 U/L (ref 15–37)
BASOPHILS # BLD: 0 K/UL (ref 0–0.1)
BASOPHILS NFR BLD: 0 % (ref 0–1)
BILIRUB SERPL-MCNC: 0.8 MG/DL (ref 0.2–1)
BUN SERPL-MCNC: 15 MG/DL (ref 6–20)
BUN/CREAT SERPL: 15 (ref 12–20)
CALCIUM SERPL-MCNC: 8.8 MG/DL (ref 8.5–10.1)
CHLORIDE SERPL-SCNC: 104 MMOL/L (ref 97–108)
CO2 SERPL-SCNC: 21 MMOL/L (ref 21–32)
COVID-19 RAPID TEST, COVR: NOT DETECTED
CREAT SERPL-MCNC: 1.02 MG/DL (ref 0.7–1.3)
D DIMER PPP FEU-MCNC: 0.56 MG/L FEU (ref 0–0.65)
DIFFERENTIAL METHOD BLD: ABNORMAL
EOSINOPHIL # BLD: 0 K/UL (ref 0–0.4)
EOSINOPHIL NFR BLD: 0 % (ref 0–7)
ERYTHROCYTE [DISTWIDTH] IN BLOOD BY AUTOMATED COUNT: 13.4 % (ref 11.5–14.5)
FERRITIN SERPL-MCNC: 474 NG/ML (ref 26–388)
GLOBULIN SER CALC-MCNC: 4.9 G/DL (ref 2–4)
GLUCOSE SERPL-MCNC: 184 MG/DL (ref 65–100)
HCT VFR BLD AUTO: 37.8 % (ref 36.6–50.3)
HGB BLD-MCNC: 13.3 G/DL (ref 12.1–17)
IMM GRANULOCYTES # BLD AUTO: 0 K/UL (ref 0–0.04)
IMM GRANULOCYTES NFR BLD AUTO: 1 % (ref 0–0.5)
LYMPHOCYTES # BLD: 0.6 K/UL (ref 0.8–3.5)
LYMPHOCYTES NFR BLD: 16 % (ref 12–49)
MCH RBC QN AUTO: 30.1 PG (ref 26–34)
MCHC RBC AUTO-ENTMCNC: 35.2 G/DL (ref 30–36.5)
MCV RBC AUTO: 85.5 FL (ref 80–99)
MONOCYTES # BLD: 0.2 K/UL (ref 0–1)
MONOCYTES NFR BLD: 5 % (ref 5–13)
NEUTS SEG # BLD: 3.1 K/UL (ref 1.8–8)
NEUTS SEG NFR BLD: 78 % (ref 32–75)
NRBC # BLD: 0 K/UL (ref 0–0.01)
NRBC BLD-RTO: 0 PER 100 WBC
PLATELET # BLD AUTO: 165 K/UL (ref 150–400)
PMV BLD AUTO: 10.8 FL (ref 8.9–12.9)
POTASSIUM SERPL-SCNC: 4.1 MMOL/L (ref 3.5–5.1)
PROCALCITONIN SERPL-MCNC: <0.05 NG/ML
PROT SERPL-MCNC: 7.9 G/DL (ref 6.4–8.2)
RBC # BLD AUTO: 4.42 M/UL (ref 4.1–5.7)
RBC MORPH BLD: ABNORMAL
SARS-COV-2, COV2: NORMAL
SARS-COV-2, COV2: NORMAL
SODIUM SERPL-SCNC: 132 MMOL/L (ref 136–145)
SOURCE, COVRS: NORMAL
WBC # BLD AUTO: 3.9 K/UL (ref 4.1–11.1)

## 2021-03-17 PROCEDURE — 85025 COMPLETE CBC W/AUTO DIFF WBC: CPT

## 2021-03-17 PROCEDURE — 74011250636 HC RX REV CODE- 250/636: Performed by: INTERNAL MEDICINE

## 2021-03-17 PROCEDURE — U0005 INFEC AGEN DETEC AMPLI PROBE: HCPCS

## 2021-03-17 PROCEDURE — 82728 ASSAY OF FERRITIN: CPT

## 2021-03-17 PROCEDURE — 87635 SARS-COV-2 COVID-19 AMP PRB: CPT

## 2021-03-17 PROCEDURE — 94760 N-INVAS EAR/PLS OXIMETRY 1: CPT

## 2021-03-17 PROCEDURE — 84145 PROCALCITONIN (PCT): CPT

## 2021-03-17 PROCEDURE — 80053 COMPREHEN METABOLIC PANEL: CPT

## 2021-03-17 PROCEDURE — 74011250636 HC RX REV CODE- 250/636: Performed by: STUDENT IN AN ORGANIZED HEALTH CARE EDUCATION/TRAINING PROGRAM

## 2021-03-17 PROCEDURE — 77010033678 HC OXYGEN DAILY

## 2021-03-17 PROCEDURE — 85379 FIBRIN DEGRADATION QUANT: CPT

## 2021-03-17 PROCEDURE — 65270000029 HC RM PRIVATE

## 2021-03-17 PROCEDURE — 3E0D73Z INTRODUCTION OF ANTI-INFLAMMATORY INTO MOUTH AND PHARYNX, VIA NATURAL OR ARTIFICIAL OPENING: ICD-10-PCS | Performed by: INTERNAL MEDICINE

## 2021-03-17 PROCEDURE — 74011000250 HC RX REV CODE- 250: Performed by: STUDENT IN AN ORGANIZED HEALTH CARE EDUCATION/TRAINING PROGRAM

## 2021-03-17 PROCEDURE — C9113 INJ PANTOPRAZOLE SODIUM, VIA: HCPCS | Performed by: STUDENT IN AN ORGANIZED HEALTH CARE EDUCATION/TRAINING PROGRAM

## 2021-03-17 PROCEDURE — 74011250637 HC RX REV CODE- 250/637: Performed by: INTERNAL MEDICINE

## 2021-03-17 PROCEDURE — 36415 COLL VENOUS BLD VENIPUNCTURE: CPT

## 2021-03-17 RX ORDER — METOPROLOL TARTRATE 5 MG/5ML
2.5 INJECTION INTRAVENOUS
Status: DISCONTINUED | OUTPATIENT
Start: 2021-03-17 | End: 2021-03-22 | Stop reason: HOSPADM

## 2021-03-17 RX ORDER — SODIUM CHLORIDE 0.9 % (FLUSH) 0.9 %
5-40 SYRINGE (ML) INJECTION EVERY 8 HOURS
Status: DISCONTINUED | OUTPATIENT
Start: 2021-03-17 | End: 2021-03-22 | Stop reason: HOSPADM

## 2021-03-17 RX ORDER — ENOXAPARIN SODIUM 100 MG/ML
30 INJECTION SUBCUTANEOUS EVERY 12 HOURS
Status: DISCONTINUED | OUTPATIENT
Start: 2021-03-17 | End: 2021-03-22 | Stop reason: HOSPADM

## 2021-03-17 RX ORDER — ZINC SULFATE 50(220)MG
220 CAPSULE ORAL DAILY
Status: DISCONTINUED | OUTPATIENT
Start: 2021-03-17 | End: 2021-03-22 | Stop reason: HOSPADM

## 2021-03-17 RX ORDER — DEXAMETHASONE 4 MG/1
6 TABLET ORAL DAILY
Status: DISCONTINUED | OUTPATIENT
Start: 2021-03-17 | End: 2021-03-22 | Stop reason: HOSPADM

## 2021-03-17 RX ORDER — PROMETHAZINE HYDROCHLORIDE 25 MG/1
12.5 TABLET ORAL
Status: DISCONTINUED | OUTPATIENT
Start: 2021-03-17 | End: 2021-03-22 | Stop reason: HOSPADM

## 2021-03-17 RX ORDER — ACETAMINOPHEN 650 MG/1
650 SUPPOSITORY RECTAL
Status: DISCONTINUED | OUTPATIENT
Start: 2021-03-17 | End: 2021-03-22 | Stop reason: HOSPADM

## 2021-03-17 RX ORDER — ASCORBIC ACID 500 MG
500 TABLET ORAL DAILY
Status: DISCONTINUED | OUTPATIENT
Start: 2021-03-17 | End: 2021-03-22 | Stop reason: HOSPADM

## 2021-03-17 RX ORDER — ACETAMINOPHEN 325 MG/1
650 TABLET ORAL
Status: DISCONTINUED | OUTPATIENT
Start: 2021-03-17 | End: 2021-03-22 | Stop reason: HOSPADM

## 2021-03-17 RX ORDER — POLYETHYLENE GLYCOL 3350 17 G/17G
17 POWDER, FOR SOLUTION ORAL DAILY PRN
Status: DISCONTINUED | OUTPATIENT
Start: 2021-03-17 | End: 2021-03-22 | Stop reason: HOSPADM

## 2021-03-17 RX ORDER — DOXYCYCLINE HYCLATE 100 MG
100 TABLET ORAL 2 TIMES DAILY
Status: DISCONTINUED | OUTPATIENT
Start: 2021-03-17 | End: 2021-03-22 | Stop reason: HOSPADM

## 2021-03-17 RX ORDER — SODIUM CHLORIDE 0.9 % (FLUSH) 0.9 %
5-40 SYRINGE (ML) INJECTION AS NEEDED
Status: DISCONTINUED | OUTPATIENT
Start: 2021-03-17 | End: 2021-03-22 | Stop reason: HOSPADM

## 2021-03-17 RX ORDER — ONDANSETRON 2 MG/ML
4 INJECTION INTRAMUSCULAR; INTRAVENOUS
Status: DISCONTINUED | OUTPATIENT
Start: 2021-03-17 | End: 2021-03-22 | Stop reason: HOSPADM

## 2021-03-17 RX ADMIN — DEXAMETHASONE 6 MG: 4 TABLET ORAL at 09:59

## 2021-03-17 RX ADMIN — PANTOPRAZOLE SODIUM 40 MG: 40 INJECTION, POWDER, FOR SOLUTION INTRAVENOUS at 12:10

## 2021-03-17 RX ADMIN — Medication 10 ML: at 22:00

## 2021-03-17 RX ADMIN — Medication 10 ML: at 12:10

## 2021-03-17 RX ADMIN — OXYCODONE HYDROCHLORIDE AND ACETAMINOPHEN 500 MG: 500 TABLET ORAL at 09:59

## 2021-03-17 RX ADMIN — METOPROLOL TARTRATE 2.5 MG: 1 INJECTION, SOLUTION INTRAVENOUS at 18:25

## 2021-03-17 RX ADMIN — ZINC SULFATE 220 MG (50 MG) CAPSULE 220 MG: CAPSULE at 09:59

## 2021-03-17 RX ADMIN — DOXYCYCLINE HYCLATE 100 MG: 100 TABLET, COATED ORAL at 18:13

## 2021-03-17 RX ADMIN — ENOXAPARIN SODIUM 30 MG: 30 INJECTION, SOLUTION INTRAVENOUS; SUBCUTANEOUS at 04:15

## 2021-03-17 RX ADMIN — DOXYCYCLINE HYCLATE 100 MG: 100 TABLET, COATED ORAL at 09:59

## 2021-03-17 RX ADMIN — Medication 10 ML: at 05:20

## 2021-03-17 RX ADMIN — ENOXAPARIN SODIUM 30 MG: 30 INJECTION, SOLUTION INTRAVENOUS; SUBCUTANEOUS at 16:38

## 2021-03-17 NOTE — PROGRESS NOTES
End of Shift Note    Bedside shift change report given to Jag Srinivasan (oncoming nurse) by Wendy Max (offgoing nurse). Report included the following information SBAR, Kardex, Intake/Output, MAR, Accordion and Recent Results    Shift worked:  [id number]     Shift summary and any significant changes:     Pt transferred from [employer] - arrived on unit around 0200.        Concerns for physician to address:       Zone phone for oncoming shift:   [phone]           Wendy Max

## 2021-03-17 NOTE — ED NOTES
Per provider, okay for pt to eat. Pt provided with sandwich, crackers, and soda.  Pt reports feeling better

## 2021-03-17 NOTE — ED NOTES
Provider updated pt and his family on transfer to [facility]. Pt verbalized understanding.

## 2021-03-17 NOTE — ED NOTES
TRANSFER - OUT REPORT:    Verbal report given to Lo Jeong RN(name) on Roel Fischer  being transferred to ED [facility] room 3255(unit) for routine progression of care       Report consisted of patients Situation, Background, Assessment and   Recommendations(SBAR). Information from the following report(s) SBAR and ED Summary was reviewed with the receiving nurse. Lines:   Peripheral IV 03/16/21 Right Hand (Active)   Site Assessment Clean, dry, & intact 03/16/21 1914   Phlebitis Assessment 0 03/16/21 1914   Infiltration Assessment 0 03/16/21 1914   Dressing Type Tape;Transparent 03/16/21 1914   Hub Color/Line Status Pink;Flushed;Patent 03/16/21 1914       Peripheral IV 03/16/21 Left;Upper Forearm (Active)   Site Assessment Clean, dry, & intact 03/16/21 1915   Phlebitis Assessment 0 03/16/21 1915   Infiltration Assessment 0 03/16/21 1915   Dressing Status Clean, dry, & intact 03/16/21 1915   Dressing Type Tape;Transparent 03/16/21 1915   Hub Color/Line Status Blue;Flushed;Patent 03/16/21 1915        Opportunity for questions and clarification was provided.       Patient transported with:   Monitor, Nasal Cannula

## 2021-03-17 NOTE — PROGRESS NOTES
Patient seen and examined    On 3LPM NC. Gets winded with minimal activity. BP on higher side. No hx of hypertension. PRN antihypertensives for now. IF BP remains high will start oral antihypertensive. Rapid COVID test negative. States he was tested +ve at [address] on Monday. Wife also tested positive but not hospitalized. COVID PCR pending. Continue zn, vit C, and dexamethasone. Awaiting PCR result before starting remdesivir. Continue empiric abx for now. Procalcitonin low. Will d/c abx if COVID confirmed. Patient in agreement with plan.

## 2021-03-17 NOTE — PROGRESS NOTES
Transition of Care Plan:     RUR: 7%  Disposition: Home  Follow up appointments: Pt's PCP retired (Dr. Samaria Arroyo). Needs a New PCP - requested CM to find and schedule an appointment. Patient requesting to have a PCP near him ([facility], [facility], etc.)  DME needed: Possible home O2 - will need O2 challenge completed 24 hrs prior to d/c  Transportation at [address] or means to access home: Spouse will have access   Caregiver Contact: Jaelyn Bonilla, wife, [phone]  Discharge Caregiver contacted prior to discharge? CM to contact family prior to d/c if given permission      Reason for Admission: Acute hypoxic respiratory failure, COVID-19 PNA                    RUR Score: 7%           Plan for utilizing home health:  No needs identified at this time.       PCP: Pt's PCP retired. Verbalizes need for a PCP. Asked CM to find a PCP close to his residence. Asked CM to find a PCP and schedule appointment. Current Advanced Directive/Advance Care Plan: Full Code. No ACP documents on file. Healthcare Decision Maker:    Primary Decision Maker: Jorge Young - [phone]                 Transition of Care Plan:  Home with OP services    CM spoke with patient via bedside telephone to complete initial assessment due to contact isolation precautions. CM introduced role, verified demographics, and discussed discharge planning. 64year old  Male admitted to [facility] for acute hypoxic respiratory failure scondary to COVID-19 pneumonia and left axilllary abscess. Pt reports to be independent with ADL/IADLs to include driving. Pt denied mobility concerns and is ambulatory at baseline. Pt resides in a tri-level home with his wife and 15year old son. Outside does not have any steps. Inside has about 8 steps up and 5 steps down. Pt reports that he has been staying downstairs due to [place] the bathroom is right across from his bed.  Pt has [employer] H[employer] for insurance and denies having any problems with getting his medications. No reported HH, SNF, or IP Rehab. No reported DME. Pt is currently requiring 3L supplemental oxygen which RN staff will attempt to wean off prior to d/c. If pt cannot be completely weaned off CM will arrange for home O2 (COVID-19 is qualifying dx). Pt plans to be transported home by spouse at time of discharge. CM will continue to follow this patient while admitted to current unit and remain available for transitional care planning as needed. If any concerns or questions arise pertaining to this patient's discharge needs, please contact CM at the contact information provided below. Care Management Interventions  PCP Verified by CM: No(Pt's PCP retired. Need a new PCP. Wanting close to house. CM will find.)  Mode of Transport at Discharge: Other (see comment)(spouse will transport)  Transition of Care Consult (CM Consult): Other, Discharge Planning(home with outpt services)  Discharge Durable Medical Equipment: No(No reported DME.)  Physical Therapy Consult: No  Occupational Therapy Consult: No  Speech Therapy Consult: No  Current Support Network: Lives with Spouse(Lives with spouse and 15year old son.)  Discharge Location  Discharge Placement: Home with outpatient services     _______________________________    Advance Care Planning     General Advance Care Planning (ACP) Conversation      Date of Conversation: 3/17/2021  Conducted with: Patient with Decision Making Capacity    Healthcare Decision Maker:     Primary Decision Maker: Dilip Hudson - Spouse - [phone]  Today we documented Decision Maker(s) consistent with Legal Next of Kin hierarchy.     Content/Action Overview:   Has NO ACP documents/care preferences - information provided, considering goals and options  Reviewed DNR/DNI and patient elects Full Code (Attempt Resuscitation)  Topics discussed: resuscitation preferences    Length of Voluntary ACP Conversation in minutes:  <16 minutes (Non-Billable)      Chidi Pepper, RN, BSN, DR ALVARADO [facility]  Care Management  [phone]    Rodo Hauser [facility] [address] [address] [facility]  [phone]

## 2021-03-17 NOTE — H&P
Hospitalist Admission Note    NAME: Reina Benavides   :  1960   MRN:  [id number]     Date/Time:  3/17/2021 2:15 AM    Patient PCP: Betty Reeves MD  ______________________________________________________________________  Given the patient's current clinical presentation, I have a high level of concern for decompensation if discharged from the emergency department. Complex decision making was performed, which includes reviewing the patient's available past medical records, laboratory results, and x-ray films. My assessment of this patient's clinical condition and my plan of care is as follows. Assessment / Plan:  Acute hypoxic respiratory failure POA  COVID-19 pneumonia POA  -Patient reports he was tested positive for COVID-19 at [place] on Monday  -Chest x-ray suggestive of interstitial pneumonia  -He was requiring up to 3 L oxygen at our [place] [address] to the medical floor with droplet plus isolation  -Obtain records from [facility]  -Repeat COVID-19 testing  -Start ceftriaxone to cover for possible superadded infection  -Patient is already on doxycycline for axillary abscess  -Add vitamin C and zinc  -Maintain oxygen saturation above 94%  -We will add dexamethasone and remdesivir when confirmation of the COVID-19 test is available  -Albuterol as needed for shortness of breath  -Monitor inflammatory markers      Left axillary abscess  -Continue doxycycline    Code Status: Full code  Surrogate Decision Maker: Patient's wife    DVT Prophylaxis: Lovenox  GI Prophylaxis: not indicated    Baseline: Independent      Subjective:   CHIEF COMPLAINT: Shortness of breath for 3 days    HISTORY OF PRESENT ILLNESS:     Reina Benavides is a 64 y.o.  male with no significant past medical history who was seen at [facility] on Saturday for left axillary abscess where he underwent incision and drainage and was prescribed doxycycline.   He reports starting Monday he was having symptoms of cough and shortness of breath and was tested positive for COVID-19 on [facility]. His wife having similar symptoms was also tested positive. He reports for the last 2 days he has progressive shortness of breath with cough and is unable to walk a few feet without getting shortness of breath. He denies any subjective fever or chills. He was seen at [facility] where chest x-ray shows infiltrates. He was requiring 2 to 3 L oxygen admission to the hospital.    At the time of evaluation patient denies any chest pain, palpitations, fever, chills, nausea, vomiting, diarrhea, urine urgency frequency dysuria, heat or cold intolerance . We were asked to admit for work up and evaluation of the above problems. Past Medical History:   Diagnosis Date    Gastrointestinal disorder     GERDS    GERD (gastroesophageal reflux disease)     Ill-defined condition     MRSA        Past Surgical History:   Procedure Laterality Date    HX APPENDECTOMY      HX HEENT      right eye surgery    HX OTHER SURGICAL      right eye surgery    HX UROLOGICAL      laser surgery on \"my private parts\"       Social History     Tobacco Use    Smoking status: Never Smoker    Smokeless tobacco: Never Used   Substance Use Topics    Alcohol use: Yes     Comment: wine coolers        No family history on file. And significant for early cardiac disease or malignancy  No Known Allergies     Prior to Admission medications    Medication Sig Start Date End Date Taking? Authorizing Provider   ibuprofen (MOTRIN) 800 mg tablet Take 1 Tab by mouth every six (6) hours as needed for Pain for up to 7 days. 3/12/21 3/19/21  Carlos Chinchilla NP   doxycycline (VIBRA-TABS) 100 mg tablet Take 1 Tab by mouth two (2) times a day for 10 days. 3/12/21 3/22/21  Carlos Chinchilla NP   cephALEXin (Keflex) 500 mg capsule Take 1 Cap by mouth four (4) times daily for 7 days.  3/12/21 3/19/21  Carlos Chinchilla NP       REVIEW OF SYSTEMS:     I am not able to complete the review of systems because: The patient is intubated and sedated    The patient has altered mental status due to his acute medical problems    The patient has baseline aphasia from prior stroke(s)    The patient has baseline dementia and is not reliable historian    The patient is in acute medical distress and unable to provide information           Total of 12 systems reviewed as follows:       POSITIVE= underlined text  Negative = text not underlined  General:  fever, chills, sweats, generalized weakness, weight loss/gain,      loss of appetite   Eyes:    blurred vision, eye pain, loss of vision, double vision  ENT:    rhinorrhea, pharyngitis   Respiratory:   cough, sputum production, SOB, FORREST, wheezing, pleuritic pain   Cardiology:   chest pain, palpitations, orthopnea, PND, edema, syncope   Gastrointestinal:  abdominal pain , N/V, diarrhea, dysphagia, constipation, bleeding   Genitourinary:  frequency, urgency, dysuria, hematuria, incontinence   Muskuloskeletal :  arthralgia, myalgia, back pain  Hematology:  easy bruising, nose or gum bleeding, lymphadenopathy   Dermatological: rash, ulceration, pruritis, color change / jaundice  Endocrine:   hot flashes or polydipsia   Neurological:  headache, dizziness, confusion, focal weakness, paresthesia,     Speech difficulties, memory loss, gait difficulty  Psychological: Feelings of anxiety, depression, agitation    Objective:   VITALS:    Visit Vitals  BP (!) (P) 141/78 (BP 1 Location: Left upper arm, BP Patient Position: At rest)   Pulse (P) 81   Temp (P) 97.9 °F (36.6 °C)   Resp (P) 19   SpO2 (P) 95%       PHYSICAL EXAM:    General:    Alert, cooperative, no distress, appears stated age. HEENT: Atraumatic, anicteric sclerae, pink conjunctivae     No oral ulcers, mucosa moist, throat clear, dentition fair  Neck:  Supple, symmetrical,  thyroid: non tender  Lungs:   Clear to auscultation bilaterally.   No Wheezing or Rhonchi. No rales. Chest wall:  No tenderness  No Accessory muscle use. Heart:   Regular  rhythm,  No  murmur   No edema  Abdomen:   Soft, non-tender. Not distended. Bowel sounds normal  Extremities: No cyanosis. No clubbing,      Skin turgor normal, Capillary refill normal, Radial dial pulse 2+  Skin:     Not pale. Not Jaundiced  No rashes   Psych:  Good insight. Not depressed. Not anxious or agitated. Neurologic: EOMs intact. No facial asymmetry. No aphasia or slurred speech. Symmetrical strength, Sensation grossly intact. Alert and oriented X 4.     _______________________________________________________________________  Care Plan discussed with:    Comments   Patient x    Family      RN x    Care Manager                    Consultant:      _______________________________________________________________________  Expected  Disposition:   Home with Family x   HH/PT/OT/RN    SNF/LTC    SHILOH    ________________________________________________________________________  TOTAL TIME:  39 Minutes    Critical Care Provided     Minutes non procedure based      Comments    x Reviewed previous records   >50% of visit spent in counseling and coordination of care  Discussion with patient and/or family and questions answered       ________________________________________________________________________  Signed: Bay An MD    Procedures: see electronic medical records for all procedures/Xrays and details which were not copied into this note but were reviewed prior to creation of Plan.     LAB DATA REVIEWED:    Recent Results (from the past 24 hour(s))   CBC WITH AUTOMATED DIFF    Collection Time: 03/16/21  7:36 PM   Result Value Ref Range    WBC 6.4 4.1 - 11.1 K/uL    RBC 4.69 4.10 - 5.70 M/uL    HGB 14.5 12.1 - 17.0 g/dL    HCT 40.0 36.6 - 50.3 %    MCV 85.3 80.0 - 99.0 FL    MCH 30.9 26.0 - 34.0 PG    MCHC 36.3 30.0 - 36.5 g/dL    RDW 13.2 11.5 - 14.5 %    PLATELET 281 264 - 660 K/uL    MPV 11.0 8.9 - 12.9 FL NRBC 0.0 0  WBC    ABSOLUTE NRBC 0.00 0.00 - 0.01 K/uL    NEUTROPHILS 60 32 - 75 %    LYMPHOCYTES 25 12 - 49 %    MONOCYTES 14 (H) 5 - 13 %    EOSINOPHILS 0 0 - 7 %    BASOPHILS 0 0 - 1 %    IMMATURE GRANULOCYTES 1 (H) 0.0 - 0.5 %    ABS. NEUTROPHILS 3.9 1.8 - 8.0 K/UL    ABS. LYMPHOCYTES 1.6 0.8 - 3.5 K/UL    ABS. MONOCYTES 0.9 0.0 - 1.0 K/UL    ABS. EOSINOPHILS 0.0 0.0 - 0.4 K/UL    ABS. BASOPHILS 0.0 0.0 - 0.1 K/UL    ABS. IMM. GRANS. 0.0 0.00 - 0.04 K/UL    DF AUTOMATED     METABOLIC PANEL, COMPREHENSIVE    Collection Time: 03/16/21  7:36 PM   Result Value Ref Range    Sodium 133 (L) 136 - 145 mmol/L    Potassium 4.1 3.5 - 5.1 mmol/L    Chloride 102 97 - 108 mmol/L    CO2 25 21 - 32 mmol/L    Anion gap 6 5 - 15 mmol/L    Glucose 108 (H) 65 - 100 mg/dL    BUN 11 6 - 20 MG/DL    Creatinine 1.14 0.70 - 1.30 MG/DL    BUN/Creatinine ratio 10 (L) 12 - 20      GFR est AA >60 >60 ml/min/1.73m2    GFR est non-AA >60 >60 ml/min/1.73m2    Calcium 8.9 8.5 - 10.1 MG/DL    Bilirubin, total 1.0 0.2 - 1.0 MG/DL    ALT (SGPT) 60 12 - 78 U/L    AST (SGOT) 85 (H) 15 - 37 U/L    Alk.  phosphatase 45 45 - 117 U/L    Protein, total 8.2 6.4 - 8.2 g/dL    Albumin 3.3 (L) 3.5 - 5.0 g/dL    Globulin 4.9 (H) 2.0 - 4.0 g/dL    A-G Ratio 0.7 (L) 1.1 - 2.2     PROCALCITONIN    Collection Time: 03/16/21  7:36 PM   Result Value Ref Range    Procalcitonin <0.05 ng/mL   LACTIC ACID    Collection Time: 03/16/21  7:36 PM   Result Value Ref Range    Lactic acid 1.9 0.4 - 2.0 MMOL/L   TROPONIN I    Collection Time: 03/16/21  7:36 PM   Result Value Ref Range    Troponin-I, Qt. <0.05 <0.05 ng/mL   NT-PRO BNP    Collection Time: 03/16/21  7:36 PM   Result Value Ref Range    NT pro-BNP 11 0 - 125 PG/ML   D DIMER    Collection Time: 03/16/21  7:36 PM   Result Value Ref Range    D-dimer 0.65 0.00 - 0.65 mg/L FEU   FIBRINOGEN    Collection Time: 03/16/21  7:36 PM   Result Value Ref Range    Fibrinogen 500 (H) 200 - 475 mg/dL   MAGNESIUM    Collection Time: 03/16/21  7:36 PM   Result Value Ref Range    Magnesium 2.2 1.6 - 2.4 mg/dL

## 2021-03-17 NOTE — ED NOTES
Bedside and Verbal shift change report given to Marichuy Dhillon RN (oncoming nurse) by Antoni Jimenez RN (offgoing nurse). Report included the following information SBAR and ED Summary.

## 2021-03-18 LAB
ALBUMIN SERPL-MCNC: 3.2 G/DL (ref 3.5–5)
ALBUMIN/GLOB SERPL: 0.6 {RATIO} (ref 1.1–2.2)
ALP SERPL-CCNC: 45 U/L (ref 45–117)
ALT SERPL-CCNC: 53 U/L (ref 12–78)
ANION GAP SERPL CALC-SCNC: 6 MMOL/L (ref 5–15)
AST SERPL-CCNC: 56 U/L (ref 15–37)
BASOPHILS # BLD: 0 K/UL (ref 0–0.1)
BASOPHILS NFR BLD: 0 % (ref 0–1)
BILIRUB SERPL-MCNC: 0.6 MG/DL (ref 0.2–1)
BUN SERPL-MCNC: 16 MG/DL (ref 6–20)
BUN/CREAT SERPL: 15 (ref 12–20)
CALCIUM SERPL-MCNC: 8.9 MG/DL (ref 8.5–10.1)
CHLORIDE SERPL-SCNC: 104 MMOL/L (ref 97–108)
CO2 SERPL-SCNC: 25 MMOL/L (ref 21–32)
CREAT SERPL-MCNC: 1.06 MG/DL (ref 0.7–1.3)
DIFFERENTIAL METHOD BLD: ABNORMAL
EOSINOPHIL # BLD: 0 K/UL (ref 0–0.4)
EOSINOPHIL NFR BLD: 0 % (ref 0–7)
ERYTHROCYTE [DISTWIDTH] IN BLOOD BY AUTOMATED COUNT: 13.3 % (ref 11.5–14.5)
GLOBULIN SER CALC-MCNC: 5.1 G/DL (ref 2–4)
GLUCOSE SERPL-MCNC: 130 MG/DL (ref 65–100)
HCT VFR BLD AUTO: 39.7 % (ref 36.6–50.3)
HGB BLD-MCNC: 14 G/DL (ref 12.1–17)
IMM GRANULOCYTES # BLD AUTO: 0.1 K/UL (ref 0–0.04)
IMM GRANULOCYTES NFR BLD AUTO: 1 % (ref 0–0.5)
LYMPHOCYTES # BLD: 1.1 K/UL (ref 0.8–3.5)
LYMPHOCYTES NFR BLD: 9 % (ref 12–49)
MCH RBC QN AUTO: 30.7 PG (ref 26–34)
MCHC RBC AUTO-ENTMCNC: 35.3 G/DL (ref 30–36.5)
MCV RBC AUTO: 87.1 FL (ref 80–99)
MONOCYTES # BLD: 1.3 K/UL (ref 0–1)
MONOCYTES NFR BLD: 11 % (ref 5–13)
NEUTS SEG # BLD: 9.3 K/UL (ref 1.8–8)
NEUTS SEG NFR BLD: 79 % (ref 32–75)
NRBC # BLD: 0 K/UL (ref 0–0.01)
NRBC BLD-RTO: 0 PER 100 WBC
PLATELET # BLD AUTO: 217 K/UL (ref 150–400)
PMV BLD AUTO: 11.1 FL (ref 8.9–12.9)
POTASSIUM SERPL-SCNC: 4.5 MMOL/L (ref 3.5–5.1)
PROT SERPL-MCNC: 8.3 G/DL (ref 6.4–8.2)
RBC # BLD AUTO: 4.56 M/UL (ref 4.1–5.7)
SARS-COV-2, XPLCVT: DETECTED
SODIUM SERPL-SCNC: 135 MMOL/L (ref 136–145)
SOURCE, COVRS: ABNORMAL
WBC # BLD AUTO: 11.7 K/UL (ref 4.1–11.1)

## 2021-03-18 PROCEDURE — 77010033678 HC OXYGEN DAILY

## 2021-03-18 PROCEDURE — 94760 N-INVAS EAR/PLS OXIMETRY 1: CPT

## 2021-03-18 PROCEDURE — 65270000029 HC RM PRIVATE

## 2021-03-18 PROCEDURE — 74011000258 HC RX REV CODE- 258: Performed by: INTERNAL MEDICINE

## 2021-03-18 PROCEDURE — 80053 COMPREHEN METABOLIC PANEL: CPT

## 2021-03-18 PROCEDURE — C9113 INJ PANTOPRAZOLE SODIUM, VIA: HCPCS | Performed by: STUDENT IN AN ORGANIZED HEALTH CARE EDUCATION/TRAINING PROGRAM

## 2021-03-18 PROCEDURE — 74011250636 HC RX REV CODE- 250/636: Performed by: STUDENT IN AN ORGANIZED HEALTH CARE EDUCATION/TRAINING PROGRAM

## 2021-03-18 PROCEDURE — 74011000250 HC RX REV CODE- 250: Performed by: INTERNAL MEDICINE

## 2021-03-18 PROCEDURE — 74011250636 HC RX REV CODE- 250/636: Performed by: INTERNAL MEDICINE

## 2021-03-18 PROCEDURE — 74011000250 HC RX REV CODE- 250: Performed by: STUDENT IN AN ORGANIZED HEALTH CARE EDUCATION/TRAINING PROGRAM

## 2021-03-18 PROCEDURE — 36415 COLL VENOUS BLD VENIPUNCTURE: CPT

## 2021-03-18 PROCEDURE — 74011250637 HC RX REV CODE- 250/637: Performed by: INTERNAL MEDICINE

## 2021-03-18 PROCEDURE — 85025 COMPLETE CBC W/AUTO DIFF WBC: CPT

## 2021-03-18 PROCEDURE — XW033E5 INTRODUCTION OF REMDESIVIR ANTI-INFECTIVE INTO PERIPHERAL VEIN, PERCUTANEOUS APPROACH, NEW TECHNOLOGY GROUP 5: ICD-10-PCS | Performed by: INTERNAL MEDICINE

## 2021-03-18 RX ORDER — PANTOPRAZOLE SODIUM 40 MG/1
40 TABLET, DELAYED RELEASE ORAL
Status: DISCONTINUED | OUTPATIENT
Start: 2021-03-19 | End: 2021-03-22 | Stop reason: HOSPADM

## 2021-03-18 RX ADMIN — PANTOPRAZOLE SODIUM 40 MG: 40 INJECTION, POWDER, FOR SOLUTION INTRAVENOUS at 11:12

## 2021-03-18 RX ADMIN — Medication 10 ML: at 18:35

## 2021-03-18 RX ADMIN — DOXYCYCLINE HYCLATE 100 MG: 100 TABLET, COATED ORAL at 11:12

## 2021-03-18 RX ADMIN — ZINC SULFATE 220 MG (50 MG) CAPSULE 220 MG: CAPSULE at 11:12

## 2021-03-18 RX ADMIN — DOXYCYCLINE HYCLATE 100 MG: 100 TABLET, COATED ORAL at 18:35

## 2021-03-18 RX ADMIN — DEXAMETHASONE 6 MG: 4 TABLET ORAL at 11:12

## 2021-03-18 RX ADMIN — Medication 5 ML: at 06:41

## 2021-03-18 RX ADMIN — ENOXAPARIN SODIUM 30 MG: 30 INJECTION, SOLUTION INTRAVENOUS; SUBCUTANEOUS at 15:42

## 2021-03-18 RX ADMIN — Medication 10 ML: at 21:20

## 2021-03-18 RX ADMIN — OXYCODONE HYDROCHLORIDE AND ACETAMINOPHEN 500 MG: 500 TABLET ORAL at 11:12

## 2021-03-18 RX ADMIN — ENOXAPARIN SODIUM 30 MG: 30 INJECTION, SOLUTION INTRAVENOUS; SUBCUTANEOUS at 02:50

## 2021-03-18 RX ADMIN — REMDESIVIR 200 MG: 100 INJECTION, POWDER, LYOPHILIZED, FOR SOLUTION INTRAVENOUS at 21:19

## 2021-03-18 RX ADMIN — CEFTRIAXONE 1 G: 1 INJECTION, POWDER, FOR SOLUTION INTRAMUSCULAR; INTRAVENOUS at 01:43

## 2021-03-18 NOTE — PROGRESS NOTES
Verbal shift change report given to [employer] (oncoming nurse) by Bob Delgado (offgoing nurse). Report included the following information SBAR, Kardex, Intake/Output, MAR and Recent Results.

## 2021-03-18 NOTE — PROGRESS NOTES
End of Shift Note    Bedside shift change report given to Gerson Gleason (oncoming nurse) by Peggy Velasquez RN (offgoing nurse). Report included the following information SBAR, Kardex, Intake/Output, MAR and Recent Results    Shift worked: [id number]     Shift summary and any significant changes:    Patient gets really SOB on exertion. When walking to bathroom, patient says he was gasping for air but after a few deep breaths returned to normal.    Patient had 1 BM overnight. COVID test still pending. Patient's O2 sats overnight were between 91-93% on 3L nasal cannula at rest.     BPs elevated overnight but did not meet criteria for PRN metoprolol. Systolic BP was not greater than 170 overnight. Concerns for physician to address: May need to increase oxygen when patient is ambulating. Zone phone for oncoming shift:   [phone]       Activity:  Activity Level: Up with Assistance  Number times ambulated in hallways past shift: 0  Number of times OOB to chair past shift: 0    Cardiac:   Cardiac Monitoring: No           Access:   Current line(s): PIV     Genitourinary:   Urinary status: voiding    Respiratory:   O2 Device: Nasal cannula  Chronic home O2 use?: NO  Incentive spirometer at bedside: NO     GI:  Last Bowel Movement Date: 03/18/21  Current diet:  DIET REGULAR  Passing flatus: YES  Tolerating current diet: YES       Pain Management:   Patient states pain is manageable on current regimen: N/A (no complaints of pain overnight)    Skin:  Joel Score: 20  Interventions: float heels and increase time out of bed    Patient Safety:  Fall Score:  Total Score: 2  Interventions: bed/chair alarm, pt to call before getting OOB and stay with me (per policy)       Length of Stay:  Expected LOS: 5d 9h  Actual LOS: [address]

## 2021-03-18 NOTE — PROGRESS NOTES
Problem: Falls - Risk of  Goal: *Absence of Falls  Description: Document Joe Bare Fall Risk and appropriate interventions in the flowsheet.   Outcome: Progressing Towards Goal  Note: Fall Risk Interventions:  Mobility Interventions: Bed/chair exit alarm, Communicate number of staff needed for ambulation/transfer, Patient to call before getting OOB, PT Consult for mobility concerns         Medication Interventions: Bed/chair exit alarm, Patient to call before getting OOB, Teach patient to arise slowly

## 2021-03-18 NOTE — ROUTINE PROCESS
Virtual new patient appointment scheduled with Dr. Hennessy Console on 3/26/21 at 8:30AM.  Apt added to AVS

## 2021-03-19 LAB
ALBUMIN SERPL-MCNC: 2.9 G/DL (ref 3.5–5)
ALBUMIN/GLOB SERPL: 0.6 {RATIO} (ref 1.1–2.2)
ALP SERPL-CCNC: 49 U/L (ref 45–117)
ALT SERPL-CCNC: 49 U/L (ref 12–78)
ANION GAP SERPL CALC-SCNC: 6 MMOL/L (ref 5–15)
AST SERPL-CCNC: 44 U/L (ref 15–37)
BILIRUB SERPL-MCNC: 0.6 MG/DL (ref 0.2–1)
BUN SERPL-MCNC: 14 MG/DL (ref 6–20)
BUN/CREAT SERPL: 13 (ref 12–20)
CALCIUM SERPL-MCNC: 8.3 MG/DL (ref 8.5–10.1)
CHLORIDE SERPL-SCNC: 107 MMOL/L (ref 97–108)
CO2 SERPL-SCNC: 23 MMOL/L (ref 21–32)
CREAT SERPL-MCNC: 1.04 MG/DL (ref 0.7–1.3)
ERYTHROCYTE [DISTWIDTH] IN BLOOD BY AUTOMATED COUNT: 13.5 % (ref 11.5–14.5)
GLOBULIN SER CALC-MCNC: 4.8 G/DL (ref 2–4)
GLUCOSE SERPL-MCNC: 151 MG/DL (ref 65–100)
HCT VFR BLD AUTO: 37.3 % (ref 36.6–50.3)
HGB BLD-MCNC: 13.4 G/DL (ref 12.1–17)
MCH RBC QN AUTO: 30.7 PG (ref 26–34)
MCHC RBC AUTO-ENTMCNC: 35.9 G/DL (ref 30–36.5)
MCV RBC AUTO: 85.4 FL (ref 80–99)
NRBC # BLD: 0 K/UL (ref 0–0.01)
NRBC BLD-RTO: 0 PER 100 WBC
PLATELET # BLD AUTO: 250 K/UL (ref 150–400)
PMV BLD AUTO: 10.1 FL (ref 8.9–12.9)
POTASSIUM SERPL-SCNC: 4.1 MMOL/L (ref 3.5–5.1)
PROT SERPL-MCNC: 7.7 G/DL (ref 6.4–8.2)
RBC # BLD AUTO: 4.37 M/UL (ref 4.1–5.7)
SODIUM SERPL-SCNC: 136 MMOL/L (ref 136–145)
WBC # BLD AUTO: 14.5 K/UL (ref 4.1–11.1)

## 2021-03-19 PROCEDURE — 36415 COLL VENOUS BLD VENIPUNCTURE: CPT

## 2021-03-19 PROCEDURE — 77010033678 HC OXYGEN DAILY

## 2021-03-19 PROCEDURE — 65270000029 HC RM PRIVATE

## 2021-03-19 PROCEDURE — 74011250637 HC RX REV CODE- 250/637: Performed by: INTERNAL MEDICINE

## 2021-03-19 PROCEDURE — 74011250636 HC RX REV CODE- 250/636: Performed by: INTERNAL MEDICINE

## 2021-03-19 PROCEDURE — 85027 COMPLETE CBC AUTOMATED: CPT

## 2021-03-19 PROCEDURE — 80053 COMPREHEN METABOLIC PANEL: CPT

## 2021-03-19 PROCEDURE — 74011000250 HC RX REV CODE- 250: Performed by: INTERNAL MEDICINE

## 2021-03-19 PROCEDURE — 74011000258 HC RX REV CODE- 258: Performed by: INTERNAL MEDICINE

## 2021-03-19 PROCEDURE — 94760 N-INVAS EAR/PLS OXIMETRY 1: CPT

## 2021-03-19 RX ADMIN — Medication 10 ML: at 23:05

## 2021-03-19 RX ADMIN — ENOXAPARIN SODIUM 30 MG: 30 INJECTION, SOLUTION INTRAVENOUS; SUBCUTANEOUS at 03:00

## 2021-03-19 RX ADMIN — DOXYCYCLINE HYCLATE 100 MG: 100 TABLET, COATED ORAL at 09:19

## 2021-03-19 RX ADMIN — DEXAMETHASONE 6 MG: 4 TABLET ORAL at 09:19

## 2021-03-19 RX ADMIN — Medication 10 ML: at 06:00

## 2021-03-19 RX ADMIN — OXYCODONE HYDROCHLORIDE AND ACETAMINOPHEN 500 MG: 500 TABLET ORAL at 09:19

## 2021-03-19 RX ADMIN — DOXYCYCLINE HYCLATE 100 MG: 100 TABLET, COATED ORAL at 17:09

## 2021-03-19 RX ADMIN — REMDESIVIR 100 MG: 100 INJECTION, POWDER, LYOPHILIZED, FOR SOLUTION INTRAVENOUS at 23:03

## 2021-03-19 RX ADMIN — PANTOPRAZOLE SODIUM 40 MG: 40 TABLET, DELAYED RELEASE ORAL at 09:19

## 2021-03-19 RX ADMIN — Medication 10 ML: at 17:09

## 2021-03-19 RX ADMIN — ENOXAPARIN SODIUM 30 MG: 30 INJECTION, SOLUTION INTRAVENOUS; SUBCUTANEOUS at 17:09

## 2021-03-19 RX ADMIN — ZINC SULFATE 220 MG (50 MG) CAPSULE 220 MG: CAPSULE at 09:19

## 2021-03-19 NOTE — PROGRESS NOTES
Verbal shift change report given to Roni Bess (oncoming nurse) by Kisha (offgoing nurse). Report included the following information SBAR, Kardex, Intake/Output, MAR and Recent Results.

## 2021-03-19 NOTE — PROGRESS NOTES
Hospitalist Progress Note    NAME: Otto Braun   :  1960   MRN:  [id number]       Assessment / Plan:  Acute hypoxic respiratory failure POA  COVID-19 pneumonia POA  -Positive Covid test at [employer] monitor  -Chest ray interstitial pneumonia  -Currently on 3 L nasal cannula O2. Breathing unlabored, comfortably carries on a conversation  -Wean O2 to maintain sats ~90%  -O2 screening prior to discharge  -Non-smoker, no prior history of lung disease  -Continue Decadron  -start remdesivir, pharm consult to dose  -Procalcitonin less than 0.05. DC ceftriaxone    Left axillary region  -S/p I&D prior to transfer here. Will started on doxycycline, continue     Code Status: Full code  Surrogate Decision Maker: Patient's wife     DVT Prophylaxis: Lovenox  GI Prophylaxis: not indicated     Baseline: Independent     Subjective:     Chief Complaint / Reason for Physician Visit  Cough with deep breaths. No fevers, but bringing up sputum. No other complaints    Discussed with RN events overnight. Review of Systems:  Symptom Y/N Comments  Symptom Y/N Comments   Fever/Chills n   Chest Pain n    Poor Appetite n   Edema n    Cough y   Abdominal Pain n    Sputum n   Joint Pain     SOB/FORREST y   Pruritis/Rash     Nausea/vomit n   Tolerating PT/OT     Diarrhea    Tolerating Diet y    Constipation    Other       Could NOT obtain due to:      Objective:     VITALS:   Last 24hrs VS reviewed since prior progress note.  Most recent are:  Patient Vitals for the past 24 hrs:   Temp Pulse Resp BP SpO2   21 1519 98.4 °F (36.9 °C) 86 16 (!) 139/94 94 %   21 1003 -- -- 14 -- 96 %   21 0744 98.4 °F (36.9 °C) 90 -- (!) 139/97 --   21 0141 -- 94 -- (!) 154/92 --       Intake/Output Summary (Last 24 hours) at 3/18/2021 2350  Last data filed at 3/18/2021 2124  Gross per 24 hour   Intake --   Output 700 ml   Net -700 ml        I had a face to face encounter and independently examined this patient on 3/18/2021, as outlined below:  PHYSICAL EXAM:  General: WD, WN. Alert, cooperative, no acute distress    EENT:  EOMI. Anicteric sclerae. MMM  Resp:  Shallow breathing. Dry cough with deep inspiration. No Rales/rhonchi/wheeze no accessory muscle use  CV:  Regular  rhythm,  No edema  GI:  Soft, Non distended, Non tender. +Bowel sounds  Neurologic:  Alert and oriented X 3, normal speech,   Psych:   Good insight. Not anxious nor agitated  Skin:  No rashes. No jaundice    Reviewed most current lab test results and cultures  YES  Reviewed most current radiology test results   YES  Review and summation of old records today    NO  Reviewed patient's current orders and MAR    YES  PMH/SH reviewed - no change compared to H&P  ________________________________________________________________________  Care Plan discussed with:    Comments   Patient x    Family      RN x    Care Manager     Consultant                        Multidiciplinary team rounds were held today with , nursing, pharmacist and clinical coordinator. Patient's plan of care was discussed; medications were reviewed and discharge planning was addressed. ________________________________________________________________________  Total NON critical care TIME:  35   Minutes    Total CRITICAL CARE TIME Spent:   Minutes non procedure based      Comments   >50% of visit spent in counseling and coordination of care     ________________________________________________________________________  Bud Clock, DO     Procedures: see electronic medical records for all procedures/Xrays and details which were not copied into this note but were reviewed prior to creation of Plan. LABS:  I reviewed today's most current labs and imaging studies.   Pertinent labs include:  Recent Labs     03/18/21 0245 03/17/21 0339 03/16/21  1936   WBC 11.7* 3.9* 6.4   HGB 14.0 13.3 14.5   HCT 39.7 37.8 40.0    165 162     Recent Labs     03/18/21 0245 03/17/21  0339 03/16/21 1936   * 132* 133*   K 4.5 4.1 4.1    104 102   CO2 25 21 25   * 184* 108*   BUN 16 15 11   CREA 1.06 1.02 1.14   CA 8.9 8.8 8.9   MG  --   --  2.2   ALB 3.2* 3.0* 3.3*   TBILI 0.6 0.8 1.0   ALT 53 58 60       Signed: Sophia Levine, DO

## 2021-03-20 LAB
ALBUMIN SERPL-MCNC: 2.8 G/DL (ref 3.5–5)
ALBUMIN/GLOB SERPL: 0.6 {RATIO} (ref 1.1–2.2)
ALP SERPL-CCNC: 51 U/L (ref 45–117)
ALT SERPL-CCNC: 47 U/L (ref 12–78)
ANION GAP SERPL CALC-SCNC: 5 MMOL/L (ref 5–15)
AST SERPL-CCNC: 34 U/L (ref 15–37)
BASOPHILS # BLD: 0 K/UL (ref 0–0.1)
BASOPHILS NFR BLD: 0 % (ref 0–1)
BILIRUB SERPL-MCNC: 0.7 MG/DL (ref 0.2–1)
BUN SERPL-MCNC: 14 MG/DL (ref 6–20)
BUN/CREAT SERPL: 14 (ref 12–20)
CALCIUM SERPL-MCNC: 8.5 MG/DL (ref 8.5–10.1)
CHLORIDE SERPL-SCNC: 107 MMOL/L (ref 97–108)
CO2 SERPL-SCNC: 24 MMOL/L (ref 21–32)
CREAT SERPL-MCNC: 0.97 MG/DL (ref 0.7–1.3)
DIFFERENTIAL METHOD BLD: ABNORMAL
EOSINOPHIL # BLD: 0 K/UL (ref 0–0.4)
EOSINOPHIL NFR BLD: 0 % (ref 0–7)
ERYTHROCYTE [DISTWIDTH] IN BLOOD BY AUTOMATED COUNT: 13.5 % (ref 11.5–14.5)
GLOBULIN SER CALC-MCNC: 4.7 G/DL (ref 2–4)
GLUCOSE SERPL-MCNC: 116 MG/DL (ref 65–100)
HCT VFR BLD AUTO: 38.3 % (ref 36.6–50.3)
HGB BLD-MCNC: 13.6 G/DL (ref 12.1–17)
IMM GRANULOCYTES # BLD AUTO: 0 K/UL (ref 0–0.04)
IMM GRANULOCYTES NFR BLD AUTO: 0 % (ref 0–0.5)
LYMPHOCYTES # BLD: 2.1 K/UL (ref 0.8–3.5)
LYMPHOCYTES NFR BLD: 15 % (ref 12–49)
MCH RBC QN AUTO: 30.6 PG (ref 26–34)
MCHC RBC AUTO-ENTMCNC: 35.5 G/DL (ref 30–36.5)
MCV RBC AUTO: 86.1 FL (ref 80–99)
MONOCYTES # BLD: 0.9 K/UL (ref 0–1)
MONOCYTES NFR BLD: 6 % (ref 5–13)
NEUTS BAND NFR BLD MANUAL: 5 %
NEUTS SEG # BLD: 11.2 K/UL (ref 1.8–8)
NEUTS SEG NFR BLD: 74 % (ref 32–75)
NRBC # BLD: 0 K/UL (ref 0–0.01)
NRBC BLD-RTO: 0 PER 100 WBC
PLATELET # BLD AUTO: 275 K/UL (ref 150–400)
PMV BLD AUTO: 10.3 FL (ref 8.9–12.9)
POTASSIUM SERPL-SCNC: 4.6 MMOL/L (ref 3.5–5.1)
PROT SERPL-MCNC: 7.5 G/DL (ref 6.4–8.2)
RBC # BLD AUTO: 4.45 M/UL (ref 4.1–5.7)
RBC MORPH BLD: ABNORMAL
SODIUM SERPL-SCNC: 136 MMOL/L (ref 136–145)
WBC # BLD AUTO: 14.2 K/UL (ref 4.1–11.1)
WBC MORPH BLD: ABNORMAL

## 2021-03-20 PROCEDURE — 94760 N-INVAS EAR/PLS OXIMETRY 1: CPT

## 2021-03-20 PROCEDURE — 36415 COLL VENOUS BLD VENIPUNCTURE: CPT

## 2021-03-20 PROCEDURE — 77010033678 HC OXYGEN DAILY

## 2021-03-20 PROCEDURE — 65270000029 HC RM PRIVATE

## 2021-03-20 PROCEDURE — 74011000250 HC RX REV CODE- 250: Performed by: INTERNAL MEDICINE

## 2021-03-20 PROCEDURE — 74011250637 HC RX REV CODE- 250/637: Performed by: INTERNAL MEDICINE

## 2021-03-20 PROCEDURE — 80053 COMPREHEN METABOLIC PANEL: CPT

## 2021-03-20 PROCEDURE — 74011250636 HC RX REV CODE- 250/636: Performed by: INTERNAL MEDICINE

## 2021-03-20 PROCEDURE — 85025 COMPLETE CBC W/AUTO DIFF WBC: CPT

## 2021-03-20 PROCEDURE — 74011000258 HC RX REV CODE- 258: Performed by: INTERNAL MEDICINE

## 2021-03-20 RX ADMIN — Medication 5 ML: at 21:45

## 2021-03-20 RX ADMIN — Medication 10 ML: at 13:04

## 2021-03-20 RX ADMIN — OXYCODONE HYDROCHLORIDE AND ACETAMINOPHEN 500 MG: 500 TABLET ORAL at 10:09

## 2021-03-20 RX ADMIN — DEXAMETHASONE 6 MG: 4 TABLET ORAL at 10:09

## 2021-03-20 RX ADMIN — ENOXAPARIN SODIUM 30 MG: 30 INJECTION, SOLUTION INTRAVENOUS; SUBCUTANEOUS at 04:53

## 2021-03-20 RX ADMIN — DOXYCYCLINE HYCLATE 100 MG: 100 TABLET, COATED ORAL at 17:23

## 2021-03-20 RX ADMIN — Medication 5 ML: at 05:00

## 2021-03-20 RX ADMIN — DOXYCYCLINE HYCLATE 100 MG: 100 TABLET, COATED ORAL at 10:09

## 2021-03-20 RX ADMIN — ZINC SULFATE 220 MG (50 MG) CAPSULE 220 MG: CAPSULE at 10:09

## 2021-03-20 RX ADMIN — ENOXAPARIN SODIUM 30 MG: 30 INJECTION, SOLUTION INTRAVENOUS; SUBCUTANEOUS at 15:36

## 2021-03-20 RX ADMIN — REMDESIVIR 100 MG: 100 INJECTION, POWDER, LYOPHILIZED, FOR SOLUTION INTRAVENOUS at 21:44

## 2021-03-20 RX ADMIN — PANTOPRAZOLE SODIUM 40 MG: 40 TABLET, DELAYED RELEASE ORAL at 13:04

## 2021-03-20 NOTE — PROGRESS NOTES
Problem: Falls - Risk of  Goal: *Absence of Falls  Description: Document Gina Kyle Fall Risk and appropriate interventions in the flowsheet.   Outcome: Progressing Towards Goal  Note: Fall Risk Interventions:  Mobility Interventions: Bed/chair exit alarm, Communicate number of staff needed for ambulation/transfer, Patient to call before getting OOB, PT Consult for mobility concerns         Medication Interventions: Bed/chair exit alarm, Patient to call before getting OOB, Teach patient to arise slowly    Elimination Interventions: Bed/chair exit alarm, Call light in reach, Patient to call for help with toileting needs, Toileting schedule/hourly rounds, Urinal in reach

## 2021-03-20 NOTE — PROGRESS NOTES
Hospitalist Progress Note    NAME: Monita Soulier   :  1960   MRN:  [id number]       Assessment / Plan:  Acute hypoxic respiratory failure POA  COVID-19 pneumonia POA  -Positive Covid test at [place] monitor  -Chest ray interstitial pneumonia  -On 3 L nasal cannula O2. Breathing unlabored, comfortably carries on a conversation  -Wean O2 to maintain sats ~90%  -O2 screening prior to discharge  -Non-smoker, no prior history of lung disease  -Continue Decadron  -start remdesivir, pharm consult to dose  -Procalcitonin less than 0.05. DC ceftriaxone    Left axillary region  -S/p I&D prior to transfer here. Will started on doxycycline, continue     Code Status: Full code  Surrogate Decision Maker: Patient's wife     DVT Prophylaxis: Lovenox  GI Prophylaxis: not indicated     Baseline: Independent     Subjective:     Chief Complaint / Reason for Physician Visit  SOB improved. Still some dry cough, not much sputum    Discussed with RN events overnight. Review of Systems:  Symptom Y/N Comments  Symptom Y/N Comments   Fever/Chills n   Chest Pain n    Poor Appetite n   Edema n    Cough y   Abdominal Pain n    Sputum n   Joint Pain     SOB/FORREST y   Pruritis/Rash     Nausea/vomit n   Tolerating PT/OT     Diarrhea    Tolerating Diet y    Constipation    Other       Could NOT obtain due to:      Objective:     VITALS:   Last 24hrs VS reviewed since prior progress note. Most recent are:  Patient Vitals for the past 24 hrs:   Temp Pulse Resp BP SpO2   21 1503 98.2 °F (36.8 °C) 84 16 (!) 140/97 92 %   21 0735 98.5 °F (36.9 °C) 83 16 (!) 138/94 91 %   21 2358 98.2 °F (36.8 °C) 89 16 (!) 159/95 96 %       Intake/Output Summary (Last 24 hours) at 3/19/2021 2348  Last data filed at 3/19/2021 2213  Gross per 24 hour   Intake --   Output 1350 ml   Net -1350 ml        I had a face to face encounter and independently examined this patient on 3/19/2021, as outlined below:  PHYSICAL EXAM:  General: WD, WN. Alert, cooperative, no acute distress    EENT:  EOMI. Anicteric sclerae. MMM  Resp:  Shallow breathing. Dry cough with deep inspiration. No Rales/rhonchi/wheeze no accessory muscle use  CV:  Regular  rhythm,  No edema  GI:  Soft, Non distended, Non tender. +Bowel sounds  Neurologic:  Alert and oriented X 3, normal speech,   Psych:   Good insight. Not anxious nor agitated  Skin:  No rashes. No jaundice    Reviewed most current lab test results and cultures  YES  Reviewed most current radiology test results   YES  Review and summation of old records today    NO  Reviewed patient's current orders and MAR    YES  PMH/SH reviewed - no change compared to H&P  ________________________________________________________________________  Care Plan discussed with:    Comments   Patient x    Family      RN x    Care Manager     Consultant                        Multidiciplinary team rounds were held today with , nursing, pharmacist and clinical coordinator. Patient's plan of care was discussed; medications were reviewed and discharge planning was addressed. ________________________________________________________________________  Total NON critical care TIME:  35   Minutes    Total CRITICAL CARE TIME Spent:   Minutes non procedure based      Comments   >50% of visit spent in counseling and coordination of care     ________________________________________________________________________  Nina Umanzor DO     Procedures: see electronic medical records for all procedures/Xrays and details which were not copied into this note but were reviewed prior to creation of Plan. LABS:  I reviewed today's most current labs and imaging studies.   Pertinent labs include:  Recent Labs     03/19/21  0546 03/18/21  0245 03/17/21  0339   WBC 14.5* 11.7* 3.9*   HGB 13.4 14.0 13.3   HCT 37.3 39.7 37.8    217 165     Recent Labs     03/19/21  0546 03/18/21  0245 03/17/21  0339    135* 132*   K 4.1 4.5 4.1  104 104   CO2 23 25 21   * 130* 184*   BUN 14 16 15   CREA 1.04 1.06 1.02   CA 8.3* 8.9 8.8   ALB 2.9* 3.2* 3.0*   TBILI 0.6 0.6 0.8   ALT 49 53 58       Signed: John Levine, DO

## 2021-03-20 NOTE — PROGRESS NOTES
.Bedside shift change report given to miroslava (oncoming nurse) by Ignacio Sierra (offgoing nurse). Report included the following information SBAR, Kardex, MAR and Recent Results.

## 2021-03-20 NOTE — PROGRESS NOTES
End of Shift Note    Bedside shift change report given to Rosina Peña (oncoming nurse) by Peggy Velasquez RN (offgoing nurse). Report included the following information SBAR, Kardex, Intake/Output, MAR and Recent Results    Shift worked: [id number]     Shift summary and any significant changes:    Patient rested quietly overnight. Patient still getting very SOB on exertion    Patient assisted to bathroom this morning and was very SOB and not too steady on his feet. Reminded him that he needs to call due to SOB and unsteadiness on feet. Patient agrees to calling but often does not call and says he can go to the bathroom on his own. Reminded him of importance of calling. Patient's bed alarm is set    O2 sats - 98% on 2 L nasal cannula overnight    BP elevated overnight but did not meet criteria for PRN metoprolol. Systolic BP was not greater than 170. Concerns for physician to address: Oxygen needs on exertion. Do you want to increase O2 via nasal cannula when patient is walking to bathroom? Zone phone for oncoming shift:  [phone]       Activity:  Activity Level: Up with Assistance  Number times ambulated in hallways past shift: 0  Number of times OOB to chair past shift: 0    Cardiac:   Cardiac Monitoring: No           Access:   Current line(s): PIV     Genitourinary:   Urinary status: voiding    Respiratory:   O2 Device: Nasal cannula  Chronic home O2 use?: NO  Incentive spirometer at bedside: NO     GI:  Last Bowel Movement Date: 03/18/21  Current diet:  DIET REGULAR  Passing flatus: YES  Tolerating current diet: YES       Pain Management:   Patient states pain is manageable on current regimen: N/A (no complaints of pain overnight)    Skin:  Joel Score: 20  Interventions: float heels, increase time out of bed and PT/OT consult    Patient Safety:  Fall Score:  Total Score: 3  Interventions: bed/chair alarm, pt to call before getting OOB and stay with me (per policy)  High Fall Risk: Yes    Length of Stay:  Expected LOS: 5d 9h  Actual LOS: [address] [facility]

## 2021-03-21 LAB
BACTERIA SPEC CULT: NORMAL
SERVICE CMNT-IMP: NORMAL

## 2021-03-21 PROCEDURE — 74011000250 HC RX REV CODE- 250: Performed by: INTERNAL MEDICINE

## 2021-03-21 PROCEDURE — 65270000029 HC RM PRIVATE

## 2021-03-21 PROCEDURE — 74011250637 HC RX REV CODE- 250/637: Performed by: INTERNAL MEDICINE

## 2021-03-21 PROCEDURE — 77010033678 HC OXYGEN DAILY

## 2021-03-21 PROCEDURE — 2709999900 HC NON-CHARGEABLE SUPPLY

## 2021-03-21 PROCEDURE — 94760 N-INVAS EAR/PLS OXIMETRY 1: CPT

## 2021-03-21 PROCEDURE — 74011250636 HC RX REV CODE- 250/636: Performed by: INTERNAL MEDICINE

## 2021-03-21 PROCEDURE — 74011000258 HC RX REV CODE- 258: Performed by: INTERNAL MEDICINE

## 2021-03-21 RX ADMIN — DOXYCYCLINE HYCLATE 100 MG: 100 TABLET, COATED ORAL at 08:09

## 2021-03-21 RX ADMIN — Medication 10 ML: at 21:04

## 2021-03-21 RX ADMIN — ENOXAPARIN SODIUM 30 MG: 30 INJECTION, SOLUTION INTRAVENOUS; SUBCUTANEOUS at 16:09

## 2021-03-21 RX ADMIN — PANTOPRAZOLE SODIUM 40 MG: 40 TABLET, DELAYED RELEASE ORAL at 07:30

## 2021-03-21 RX ADMIN — ENOXAPARIN SODIUM 30 MG: 30 INJECTION, SOLUTION INTRAVENOUS; SUBCUTANEOUS at 03:30

## 2021-03-21 RX ADMIN — ZINC SULFATE 220 MG (50 MG) CAPSULE 220 MG: CAPSULE at 08:09

## 2021-03-21 RX ADMIN — REMDESIVIR 100 MG: 100 INJECTION, POWDER, LYOPHILIZED, FOR SOLUTION INTRAVENOUS at 20:12

## 2021-03-21 RX ADMIN — Medication 10 ML: at 16:09

## 2021-03-21 RX ADMIN — Medication 5 ML: at 06:31

## 2021-03-21 RX ADMIN — OXYCODONE HYDROCHLORIDE AND ACETAMINOPHEN 500 MG: 500 TABLET ORAL at 08:08

## 2021-03-21 RX ADMIN — DEXAMETHASONE 6 MG: 4 TABLET ORAL at 08:08

## 2021-03-21 RX ADMIN — DOXYCYCLINE HYCLATE 100 MG: 100 TABLET, COATED ORAL at 17:38

## 2021-03-21 NOTE — PROGRESS NOTES
Hospitalist Progress Note    NAME: Marian Lowry   :  1960   MRN:  [id number]       Assessment / Plan:  Acute hypoxic respiratory failure POA  COVID-19 pneumonia POA  -Positive Covid test at [place] monitor  -Chest ray interstitial pneumonia  -On 3 L nasal cannula O2. Breathing unlabored, comfortably carries on a conversation  -Wean O2 to maintain sats ~90%  -O2 screening prior to discharge  -Non-smoker, no prior history of lung disease  -Continue Decadron  -Procalcitonin less than 0.05. DC ceftriaxone  -cont decadron and remdesivir    Left axillary region  -S/p I&D prior to transfer here. Will started on doxycycline, continue     Code Status: Full code  Surrogate Decision Maker: Patient's wife     DVT Prophylaxis: Lovenox  GI Prophylaxis: not indicated     Baseline: Independent     Subjective:     Chief Complaint / Reason for Physician Visit  Occasional cough, minimal sputum. SOB improved    Discussed with RN events overnight. Review of Systems:  Symptom Y/N Comments  Symptom Y/N Comments   Fever/Chills n   Chest Pain n    Poor Appetite n   Edema n    Cough y   Abdominal Pain n    Sputum n   Joint Pain     SOB/FORREST y   Pruritis/Rash     Nausea/vomit n   Tolerating PT/OT     Diarrhea    Tolerating Diet y    Constipation    Other       Could NOT obtain due to:      Objective:     VITALS:   Last 24hrs VS reviewed since prior progress note.  Most recent are:  Patient Vitals for the past 24 hrs:   Temp Pulse Resp BP SpO2   21 2239 97.7 °F (36.5 °C) 78 19 (!) 153/94 96 %   21 1502 97.3 °F (36.3 °C) 86 17 (!) 141/89 95 %   21 0750 98.4 °F (36.9 °C) 80 17 (!) 137/94 94 %   21 0020 98 °F (36.7 °C) 97 17 (!) 161/91 98 %       Intake/Output Summary (Last 24 hours) at 3/20/2021 2344  Last data filed at 3/20/2021 2218  Gross per 24 hour   Intake 100 ml   Output 1400 ml   Net -1300 ml        I had a face to face encounter and independently examined this patient on 3/20/2021, as outlined below:  PHYSICAL EXAM:  General: WD, WN. Alert, cooperative, no acute distress    EENT:  EOMI. Anicteric sclerae. MMM  Resp:  Shallow breathing. Dry cough with deep inspiration. No Rales/rhonchi/wheeze no accessory muscle use  CV:  Regular  rhythm,  No edema  GI:  Soft, Non distended, Non tender. +Bowel sounds  Neurologic:  Alert and oriented X 3, normal speech,   Psych:   Good insight. Not anxious nor agitated  Skin:  No rashes. No jaundice    Reviewed most current lab test results and cultures  YES  Reviewed most current radiology test results   YES  Review and summation of old records today    NO  Reviewed patient's current orders and MAR    YES  PMH/SH reviewed - no change compared to H&P  ________________________________________________________________________  Care Plan discussed with:    Comments   Patient x    Family      RN x    Care Manager     Consultant                        Multidiciplinary team rounds were held today with , nursing, pharmacist and clinical coordinator. Patient's plan of care was discussed; medications were reviewed and discharge planning was addressed. ________________________________________________________________________  Total NON critical care TIME:  35   Minutes    Total CRITICAL CARE TIME Spent:   Minutes non procedure based      Comments   >50% of visit spent in counseling and coordination of care     ________________________________________________________________________  Annabella Calabrese DO     Procedures: see electronic medical records for all procedures/Xrays and details which were not copied into this note but were reviewed prior to creation of Plan. LABS:  I reviewed today's most current labs and imaging studies.   Pertinent labs include:  Recent Labs     03/20/21  0505 03/19/21  0546 03/18/21  0245   WBC 14.2* 14.5* 11.7*   HGB 13.6 13.4 14.0   HCT 38.3 37.3 39.7    250 217     Recent Labs     03/20/21  0505 03/19/21  0546 03/18/21  0245    136 135*   K 4.6 4.1 4.5    107 104   CO2 24 23 25   * 151* 130*   BUN 14 14 16   CREA 0.97 1.04 1.06   CA 8.5 8.3* 8.9   ALB 2.8* 2.9* 3.2*   TBILI 0.7 0.6 0.6   ALT 47 49 53       Signed: Nina Umanzor DO

## 2021-03-21 NOTE — PROGRESS NOTES
Problem: Falls - Risk of  Goal: *Absence of Falls  Description: Document Franz Reason Fall Risk and appropriate interventions in the flowsheet.   Outcome: Progressing Towards Goal  Note: Fall Risk Interventions:  Mobility Interventions: Bed/chair exit alarm         Medication Interventions: Assess postural VS orthostatic hypotension    Elimination Interventions: Call light in reach, Urinal in reach

## 2021-03-21 NOTE — PROGRESS NOTES
End of Shift Note    Bedside shift change report given to Jace Quintero (oncoming nurse) by Aissatou Lombardi RN (offgoing nurse). Report included the following information SBAR, Kardex, Intake/Output, MAR and Recent Results    Shift worked: [id number]     Shift summary and any significant changes:    Patient still having some SOB on exertion and trouble catching breath after ambulating to bathroom and back to bed. Bed alarm on. O2 sats 96% on 2 L nasal cannula overnight. BP elevated overnight but does not meet criteria for PRN metoprolol. Patient rested quietly overnight   Concerns for physician to address: Should we increase oxygen level when patient is up moving around? Zone phone for oncoming shift:  [phone] (unit extension)     Activity:  Activity Level: Up with Assistance  Number times ambulated in hallways past shift: 0  Number of times OOB to chair past shift: 0    Cardiac:   Cardiac Monitoring: No           Access:   Current line(s): PIV     Genitourinary:   Urinary status: voiding    Respiratory:   O2 Device: Nasal cannula  Chronic home O2 use?: NO  Incentive spirometer at bedside: NO     GI:  Last Bowel Movement Date: 03/20/21  Current diet:  DIET REGULAR  Passing flatus: YES  Tolerating current diet: YES       Pain Management:   Patient states pain is manageable on current regimen: N/A (no complaints of pain overnight)    Skin:  Joel Score: 20  Interventions: float heels and increase time out of bed    Patient Safety:  Fall Score:  Total Score: 3  Interventions: bed/chair alarm, gripper socks, pt to call before getting OOB and stay with me (per policy)  High Fall Risk: Yes    Length of Stay:  Expected LOS: 5d 9h  Actual LOS: [address]

## 2021-03-22 VITALS
TEMPERATURE: 98.2 F | BODY MASS INDEX: 31.91 KG/M2 | HEIGHT: 72 IN | DIASTOLIC BLOOD PRESSURE: 90 MMHG | OXYGEN SATURATION: 94 % | HEART RATE: 94 BPM | SYSTOLIC BLOOD PRESSURE: 139 MMHG | WEIGHT: 235.6 LBS | RESPIRATION RATE: 16 BRPM

## 2021-03-22 LAB
ALBUMIN SERPL-MCNC: 2.8 G/DL (ref 3.5–5)
ALBUMIN/GLOB SERPL: 0.6 {RATIO} (ref 1.1–2.2)
ALP SERPL-CCNC: 51 U/L (ref 45–117)
ALT SERPL-CCNC: 47 U/L (ref 12–78)
ANION GAP SERPL CALC-SCNC: 8 MMOL/L (ref 5–15)
AST SERPL-CCNC: 27 U/L (ref 15–37)
BILIRUB SERPL-MCNC: 0.6 MG/DL (ref 0.2–1)
BUN SERPL-MCNC: 16 MG/DL (ref 6–20)
BUN/CREAT SERPL: 16 (ref 12–20)
CALCIUM SERPL-MCNC: 8.8 MG/DL (ref 8.5–10.1)
CHLORIDE SERPL-SCNC: 105 MMOL/L (ref 97–108)
CO2 SERPL-SCNC: 21 MMOL/L (ref 21–32)
CREAT SERPL-MCNC: 1.02 MG/DL (ref 0.7–1.3)
GLOBULIN SER CALC-MCNC: 4.6 G/DL (ref 2–4)
GLUCOSE SERPL-MCNC: 187 MG/DL (ref 65–100)
POTASSIUM SERPL-SCNC: 4.1 MMOL/L (ref 3.5–5.1)
PROT SERPL-MCNC: 7.4 G/DL (ref 6.4–8.2)
SODIUM SERPL-SCNC: 134 MMOL/L (ref 136–145)

## 2021-03-22 PROCEDURE — 36415 COLL VENOUS BLD VENIPUNCTURE: CPT

## 2021-03-22 PROCEDURE — 80053 COMPREHEN METABOLIC PANEL: CPT

## 2021-03-22 PROCEDURE — 77010033678 HC OXYGEN DAILY

## 2021-03-22 PROCEDURE — 74011250636 HC RX REV CODE- 250/636: Performed by: INTERNAL MEDICINE

## 2021-03-22 PROCEDURE — 74011250637 HC RX REV CODE- 250/637: Performed by: INTERNAL MEDICINE

## 2021-03-22 RX ADMIN — OXYCODONE HYDROCHLORIDE AND ACETAMINOPHEN 500 MG: 500 TABLET ORAL at 08:27

## 2021-03-22 RX ADMIN — DEXAMETHASONE 6 MG: 4 TABLET ORAL at 08:26

## 2021-03-22 RX ADMIN — PANTOPRAZOLE SODIUM 40 MG: 40 TABLET, DELAYED RELEASE ORAL at 08:27

## 2021-03-22 RX ADMIN — ZINC SULFATE 220 MG (50 MG) CAPSULE 220 MG: CAPSULE at 08:26

## 2021-03-22 RX ADMIN — Medication 5 ML: at 05:06

## 2021-03-22 RX ADMIN — DOXYCYCLINE HYCLATE 100 MG: 100 TABLET, COATED ORAL at 08:26

## 2021-03-22 NOTE — PROGRESS NOTES
Spiritual Care Assessment/Progress Note  [facility]      NAME: Brandi Pinto      MRN: [id number]  AGE: 64 y.o.  SEX: male  Mandaeism Affiliation: No Confucianism   Language: English     3/22/2021     Total Time (in minutes): 14     Spiritual Assessment begun in [facility] 3 MED TELE through conversation with:         [x]Patient        [] Family    [] Friend(s)        Reason for Consult: Initial/Spiritual assessment, patient floor     Spiritual beliefs: (Please include comment if needed)     [x] Identifies with a yasmeen tradition:         [x] Supported by a yasmeen community:            [] Claims no spiritual orientation:           [] Seeking spiritual identity:                [] Adheres to an individual form of spirituality:           [] Not able to assess:                           Identified resources for coping:      [x] Prayer                               [x] Music                  [] Guided Imagery     [x] Family/friends                 [] Pet visits     [x] Devotional reading                         [] Unknown     [] Other:                                               Interventions offered during this visit: (See comments for more details)    Patient Interventions: Affirmation of emotions/emotional suffering, Affirmation of yasmeen, Coping skills reviewed/reinforced, Iconic (affirming the presence of God/Higher Power), Initial/Spiritual assessment, patient floor, Life review/legacy, Normalization of emotional/spiritual concerns, Prayer (actual), Prayer (assurance of)           Plan of Care:     [] Support spiritual and/or cultural needs    [] Support AMD and/or advance care planning process      [] Support grieving process   [] Coordinate Rites and/or Rituals    [] Coordination with community clergy   [] No spiritual needs identified at this time   [] Detailed Plan of Care below (See Comments)  [] Make referral to Music Therapy  [] Make referral to Pet Therapy     [] Make referral to Addiction services  [] Make referral to [facility]  [] Make referral to Spiritual Care Partner  [] No future visits requested        [x] Follow up upon further referrals     Comments: Provided initial spiritual assessment for pt Meg Ceeee on MED TELE. Due to Covid-19 protocols, visit was performed through telephone. Processed events leading up to hospitalization. Pt is a  who values his work and feels greatly supported by his wife, drawing strength from their conversations and their shared yasmeen. Processed understanding of God working within him to motivate his trip to [facility] for treatment. Pt identifies as a Religion whereby the role of the [employer] maintains special importance in his understanding of intervention within daily events. Affirmed his returning strength and hope for discharge. Assured of continued prayers.      Deandre 1 TOMÁS Polo 1 Provider   Paging Service [phone])

## 2021-03-22 NOTE — PROGRESS NOTES
Hospitalist Progress Note    NAME: Elida Allen   :  1960   MRN:  [id number]       Assessment / Plan:  Acute hypoxic respiratory failure POA  COVID-19 pneumonia POA  -Positive Covid test at [place] monitor  -Chest ray interstitial pneumonia  -On 3 L nasal cannula O2. Breathing unlabored, comfortably carries on a conversation  -Wean O2 to maintain sats ~90%  -O2 screening prior to discharge  -Non-smoker, no prior history of lung disease  -Continue Decadron  -Procalcitonin less than 0.05. DC ceftriaxone  -cont decadron and remdesivir    Left axillary region  -S/p I&D prior to transfer here. Cont doxycycline     Code Status: Full code  Surrogate Decision Maker: Patient's wife     DVT Prophylaxis: Lovenox  GI Prophylaxis: not indicated     Baseline: Independent     Subjective:     Chief Complaint / Reason for Physician Visit  SOB improving    Discussed with RN events overnight. Review of Systems:  Symptom Y/N Comments  Symptom Y/N Comments   Fever/Chills n   Chest Pain n    Poor Appetite n   Edema n    Cough y   Abdominal Pain n    Sputum n   Joint Pain     SOB/FORREST y   Pruritis/Rash     Nausea/vomit n   Tolerating PT/OT     Diarrhea    Tolerating Diet y    Constipation    Other       Could NOT obtain due to:      Objective:     VITALS:   Last 24hrs VS reviewed since prior progress note. Most recent are:  Patient Vitals for the past 24 hrs:   Temp Pulse Resp BP SpO2   21 2247 97.8 °F (36.6 °C) 74 19 (!) 149/99 95 %   21 1505 97.6 °F (36.4 °C) 81 17 132/85 94 %   21 0735 98.1 °F (36.7 °C) 73 19 (!) 140/80 97 %       Intake/Output Summary (Last 24 hours) at 3/21/2021 2331  Last data filed at 3/21/2021 2213  Gross per 24 hour   Intake 220 ml   Output 1800 ml   Net -1580 ml        I had a face to face encounter and independently examined this patient on 3/21/2021, as outlined below:  PHYSICAL EXAM:  General: WD, WN. Alert, cooperative, no acute distress    EENT:  EOMI. Anicteric sclerae. MMM  Resp:  Shallow breathing. Dry cough with deep inspiration. No Rales/rhonchi/wheeze no accessory muscle use  CV:  Regular  rhythm,  No edema  GI:  Soft, Non distended, Non tender. +Bowel sounds  Neurologic:  Alert and oriented X 3, normal speech,   Psych:   Good insight. Not anxious nor agitated  Skin:  No rashes. No jaundice    Reviewed most current lab test results and cultures  YES  Reviewed most current radiology test results   YES  Review and summation of old records today    NO  Reviewed patient's current orders and MAR    YES  PMH/SH reviewed - no change compared to H&P  ________________________________________________________________________  Care Plan discussed with:    Comments   Patient x    Family      RN x    Care Manager     Consultant                        Multidiciplinary team rounds were held today with , nursing, pharmacist and clinical coordinator. Patient's plan of care was discussed; medications were reviewed and discharge planning was addressed. ________________________________________________________________________  Total NON critical care TIME:  35   Minutes    Total CRITICAL CARE TIME Spent:   Minutes non procedure based      Comments   >50% of visit spent in counseling and coordination of care     ________________________________________________________________________  Nina Umanzor DO     Procedures: see electronic medical records for all procedures/Xrays and details which were not copied into this note but were reviewed prior to creation of Plan. LABS:  I reviewed today's most current labs and imaging studies.   Pertinent labs include:  Recent Labs     03/20/21  0505 03/19/21  0546   WBC 14.2* 14.5*   HGB 13.6 13.4   HCT 38.3 37.3    250     Recent Labs     03/20/21  0505 03/19/21  0546    136   K 4.6 4.1    107   CO2 24 23   * 151*   BUN 14 14   CREA 0.97 1.04   CA 8.5 8.3*   ALB 2.8* 2.9*   TBILI 0.7 0.6   ALT 47 49 Signed: Sweta Mckenzie, DO

## 2021-03-22 NOTE — PROGRESS NOTES
Transition of Care Plan:     RUR: 7%  Disposition: Home  Follow up appointments: New PCP appt made. Information on AVS  DME needed: No DME needs at this time  Transportation at [address] or means to access home: Spouse will have access   Caregiver Contact: Nikolas Uribe, wife, [phone]  Discharge Caregiver contacted prior to discharge? CM to contact family prior to d/c if given permission    No further needs identified at this time. Patient is ready to d/c on a CM standpoint. RN aware.     Yulia Eugene MSW  Care Manager [id number] [facility]  [phone]

## 2021-03-22 NOTE — PROGRESS NOTES
End of Shift Note    Bedside shift change report given to Ryan Saha (oncoming nurse) by Mookie Yin RN (offgoing nurse). Report included the following information Kardex, MAR and Recent Results    Shift worked:  7p-7a     Shift summary and any significant changes:     no significant changes     Concerns for physician to address:       Zone phone for oncoming shift:   [phone]       Activity:  Activity Level: Up with Assistance  Number times ambulated in hallways past shift: 0  Number of times OOB to chair past shift: 0    Cardiac:   Cardiac Monitoring: No           Access:   Current line(s): PIV     Genitourinary:   Urinary status: voiding    Respiratory:   O2 Device: Room air  Chronic home O2 use?: NO  Incentive spirometer at bedside:      GI:  Last Bowel Movement Date: 03/20/21  Current diet:  DIET REGULAR  Passing flatus: Tolerating current diet: YES       Pain Management:   Patient states pain is manageable on current regimen: N/A, no c/o pain    Skin:  Joel Score: 21  Interventions: increase time out of bed    Patient Safety:  Fall Score:  Total Score: 1  Interventions: gripper socks and pt to call before getting OOB  High Fall Risk: Yes    Length of Stay:  Expected LOS: 5d 9h  Actual LOS: [address] [facility] Solomon, RN

## 2021-03-22 NOTE — PROGRESS NOTES
Home Oxygen Test  Date of test: 3/22/2021  Time of test: 1100    Sa02 95 % on room air AT REST. Sa02 91 % on room air DURING AMBULATION.      Pt did not require oxygen during ambulation

## 2021-03-22 NOTE — DISCHARGE SUMMARY
Hospitalist Discharge Summary     Patient ID:  Bibi Lux  [id number]  13 y.o.  1960  3/17/2021    PCP on record: Sheridan Mccarthy MD    Admit date: 3/17/2021  Discharge date and time: 3/22/2021    DISCHARGE DIAGNOSIS:    See below    CONSULTATIONS:  None    Excerpted HPI from H&P of Tanya Canada MD:  Bibi Lux is a 64 y.o.  male with no significant past medical history who was seen at [facility] on Saturday for left axillary abscess where he underwent incision and drainage and was prescribed doxycycline. He reports starting Monday he was having symptoms of cough and shortness of breath and was tested positive for COVID-19 on [facility]. His wife having similar symptoms was also tested positive. He reports for the last 2 days he has progressive shortness of breath with cough and is unable to walk a few feet without getting shortness of breath. He denies any subjective fever or chills. He was seen at [facility] where chest x-ray shows infiltrates. He was requiring 2 to 3 L oxygen admission to the hospital.    At the time of evaluation patient denies any chest pain, palpitations, fever, chills, nausea, vomiting, diarrhea, urine urgency frequency dysuria, heat or cold intolerance .    ______________________________________________________________________  DISCHARGE SUMMARY/HOSPITAL COURSE:  for full details see H&P, daily progress notes, labs, consult notes. Acute hypoxic respiratory failure POA  COVID-19 pneumonia POA  -Positive Covid test at [place] [facility] monitor  -Chest ray interstitial pneumonia  -on up to 3L NC O2 during hospitalization  -weaned off O2. Completed ambulatory O2 testing and maintaining sats on RA  -O2 screening prior to discharge  -Non-smoker, no prior history of lung disease  -Continue Decadron  -Procalcitonin less than 0.05.   DC ceftriaxone  -cont decadron and remdesivir     Left axillary region  -S/p I&D prior to transfer here. Will started on doxycycline, continue    _______________________________________________________________________  Patient seen and examined by me on discharge day. Pertinent Findings:  Gen:    Not in distress  Chest: Clear lungs  CVS:   Regular rhythm. No edema  Abd:  Soft, not distended, not tender  Neuro:  Alert, oriented x 3  _______________________________________________________________________  DISCHARGE MEDICATIONS:   Current Discharge Medication List      CONTINUE these medications which have NOT CHANGED    Details   doxycycline (VIBRA-TABS) 100 mg tablet Take 1 Tab by mouth two (2) times a day for 10 days. Qty: 20 Tab, Refills: 0         STOP taking these medications       ibuprofen (MOTRIN) 800 mg tablet Comments:   Reason for Stopping:         cephALEXin (Keflex) 500 mg capsule Comments:   Reason for Stopping:                 Patient Follow Up Instructions:    Activity: Activity as tolerated  Diet: Resume previous diet    Follow-up Information     Follow up With Specialties Details Why Contact Suzanne Sosa MD Internal Medicine On 3/26/2021 For Virtual new patient appointment at 8:30AM  [address] [place], [place] [address]  [phone]          ________________________________________________________________    Risk of deterioration: Moderate    Condition at Discharge:  Stable  __________________________________________________________________    Disposition  Home with family, no needs    ____________________________________________________________________    Code Status: Full Code  ___________________________________________________________________      Total time in minutes spent coordinating this discharge (includes going over instructions, follow-up, prescriptions, and preparing report for sign off to her PCP) :  >30 minutes    Signed:  Mychal Castaneda Abner, DO

## 2021-03-22 NOTE — PROGRESS NOTES
Problem: Falls - Risk of  Goal: *Absence of Falls  Description: Document Harlan Cease Fall Risk and appropriate interventions in the flowsheet.   Outcome: Progressing Towards Goal  Note: Fall Risk Interventions:  Mobility Interventions: Bed/chair exit alarm         Medication Interventions: Patient to call before getting OOB, Teach patient to arise slowly    Elimination Interventions: Bed/chair exit alarm, Call light in reach, Urinal in reach

## 2021-03-23 ENCOUNTER — PATIENT OUTREACH (OUTPATIENT)
Dept: CASE MANAGEMENT | Age: 61
End: 2021-03-23

## 2021-03-23 NOTE — PROGRESS NOTES
Patient contacted regarding OVPIX-89 diagnosis\". Discussed COVID-19 related testing which was available at this time. Test results were positive. Patient informed of results, if available? yes     Care Transition Nurse contacted the patient by telephone to perform post discharge assessment. Call within 2 business days of discharge: Yes Verified name and  with patient as identifiers. Provided introduction to self, and explanation of the CTN/ACM role, and reason for call due to risk factors for infection and/or exposure to COVID-19. Symptoms reviewed with patient who verbalized the following symptoms: fatigue, cough, shortness of breath, no new symptoms and no worsening symptoms      Due to no new or worsening symptoms encounter was not routed to provider for escalation. Discussed follow-up appointments. If no appointment was previously scheduled, appointment scheduling offered:  n/a   [address] follow up appointment(s):   Future Appointments   Date Time Provider Robyn Connolly   3/26/2021  8:30 AM Judah Hsieh MD [facility]     Non-[facility] follow up appointment(s): none     Advance Care Planning:   Does patient have an Advance Directive:  not on file. Patient has following risk factors of: pneumonia. CTN reviewed discharge instructions, medical action plan and red flags such as increased shortness of breath, increasing fever and signs of decompensation with patient who verbalized understanding. Discussed exposure protocols and quarantine with CDC Guidelines What to do if you are sick with coronavirus disease .  Patient was given an opportunity for questions and concerns. The patient agrees to contact the [facility] exposure line [phone], [facility] [place] department [facility]  [phone] and PCP office for questions related to their healthcare. CTN provided contact information for future needs.     Reviewed and educated patient on any new and changed medications related to discharge diagnosis     Was patient discharged with a pulse oximeter? no     Patient/family/caregiver given information for [employer] and agrees to enroll no      Plan for follow-up call in 3-5 days based on severity of symptoms and risk factors.

## 2021-03-26 ENCOUNTER — VIRTUAL VISIT (OUTPATIENT)
Dept: INTERNAL MEDICINE CLINIC | Age: 61
End: 2021-03-26
Payer: COMMERCIAL

## 2021-03-26 ENCOUNTER — PATIENT OUTREACH (OUTPATIENT)
Dept: CASE MANAGEMENT | Age: 61
End: 2021-03-26

## 2021-03-26 DIAGNOSIS — J12.82 PNEUMONIA DUE TO COVID-19 VIRUS: ICD-10-CM

## 2021-03-26 DIAGNOSIS — U07.1 PNEUMONIA DUE TO COVID-19 VIRUS: ICD-10-CM

## 2021-03-26 DIAGNOSIS — U07.1 COVID-19 VIRUS INFECTION: Primary | ICD-10-CM

## 2021-03-26 PROCEDURE — 99202 OFFICE O/P NEW SF 15 MIN: CPT | Performed by: INTERNAL MEDICINE

## 2021-03-26 RX ORDER — NAPROXEN 500 MG/1
500 TABLET ORAL 2 TIMES DAILY WITH MEALS
Qty: 60 TAB | Refills: 0 | OUTPATIENT
Start: 2021-03-26 | End: 2021-11-27

## 2021-03-26 RX ORDER — BENZONATATE 200 MG/1
200 CAPSULE ORAL
Qty: 21 CAP | Refills: 0 | Status: SHIPPED | OUTPATIENT
Start: 2021-03-26 | End: 2021-04-02

## 2021-03-26 RX ORDER — COVID-19 MOLECULAR TEST ASSAY
KIT MISCELLANEOUS
COMMUNITY
Start: 2021-03-15 | End: 2021-03-26 | Stop reason: ALTCHOICE

## 2021-03-26 NOTE — PROGRESS NOTES
Bibi Lux is a 64 y.o. male who was seen by synchronous (real-time) audio-video technology on 3/26/2021 for ED Follow-up (3/17/21-3/22/21 ED [facility] for Covid 19 and pnue )        Assessment & Plan:   1. COVID-19 virus infection  Rest/fluids  Return to Ed if acutely worsen  - benzonatate (TESSALON) 200 mg capsule; Take 1 Cap by mouth three (3) times daily as needed for Cough for up to 7 days. Dispense: 21 Cap; Refill: 0  - naproxen (NAPROSYN) 500 mg tablet; Take 1 Tab by mouth two (2) times daily (with meals). Dispense: 60 Tab; Refill: 0    2. Pneumonia due to COVID-19 virus  Noted    712  Subjective:     First visit. CHRIS appointment    Pt was dx'ed w covid 3/15/2021 @ an urgent care and presented to ED w sob and was dx/ed w pneumonia. Pt was d/c'ed from hospital 4 days ago and c/o cough/fatigue and body aches. Pt relays he was d/c'ed without medication and is applying for short term disability from work and requires paperwork. Prior to Admission medications    Medication Sig Start Date End Date Taking? Authorizing Provider   benzonatate (TESSALON) 200 mg capsule Take 1 Cap by mouth three (3) times daily as needed for Cough for up to 7 days. 3/26/21 4/2/21 Yes Yuliana Cisneros MD   naproxen (NAPROSYN) 500 mg tablet Take 1 Tab by mouth two (2) times daily (with meals). 3/26/21  Yes Yuliana Cisneros MD   ID NOW COVID-19 Test Kit kit TEST AS DIRECTED 3/15/21 3/26/21  Provider, Historical     Review of systems (12) negative, except noted above. Objective:   No flowsheet data found.    General: alert, cooperative, no distress   Mental  status: normal mood, behavior, speech, dress, motor activity, and thought processes, able to follow commands   HENT: NCAT   Neck: no visualized mass   Resp: no respiratory distress   Neuro: no gross deficits   Skin: no discoloration or lesions of concern on visible areas   Psychiatric: normal affect, consistent with stated mood, no evidence of hallucinations       We discussed the expected course, resolution and complications of the diagnosis(es) in detail. Medication risks, benefits, costs, interactions, and alternatives were discussed as indicated. I advised him to contact the office if his condition worsens, changes or fails to improve as anticipated. He expressed understanding with the diagnosis(es) and plan. Dominique Lipscomb, was evaluated through a synchronous (real-time) audio-video encounter. The patient (or guardian if applicable) is aware that this is a billable service. Verbal consent to proceed has been obtained within the past 12 months. The visit was conducted pursuant to the emergency declaration under the [facility], [address] [place] [address] authority and the Office Center and [employer] General Act. Patient identification was verified, and a caregiver was present when appropriate. The patient was located in a state where the provider was credentialed to provide care.     Melisa Pate MD

## 2021-03-26 NOTE — PROGRESS NOTES
Patient had video tele health appointment with PCP. Reports continued fatigue and shortness of breath. [facility] is coming today between 1pm-3pm to \" check lungs and oxygen level. \"   CTN to follow up in 5 days.

## 2021-03-26 NOTE — PROGRESS NOTES
Chief Complaint   Patient presents with   [facility] ED Follow-up     3/17/21-3/22/21 [id number] for Covid 19 and pnue      1. Have you been to the ER, urgent care clinic since your last visit? Hospitalized since your last visit? Yes When: 3/17/21-3/22/21 [facility] for Covid 19 and pnue     2. Have you seen or consulted any other health care providers outside of the [address] [place] [address] since your last visit? Include any pap smears or colon screening.  No       Pain level 8-generalized

## 2021-04-01 ENCOUNTER — PATIENT OUTREACH (OUTPATIENT)
Dept: CASE MANAGEMENT | Age: 61
End: 2021-04-01

## 2021-04-01 NOTE — PROGRESS NOTES
Patient contacted regarding COVID-19 risk and screening. Discussed COVID-19 related testing which was available at this time. Test results were positive. Patient informed of results, if available? yes     Care Transition Nurse contacted the patient by telephone to perform follow-up assessment. Verified name and  with patient as identifiers. Symptoms reviewed with patient who verbalized the following symptoms: fatigue and shortness of breath. Was patient discharged with a pulse oximeter? no   Patient reports that he thinks his wife may have ordered one. Due to no new or worsening symptoms encounter was not routed to provider for escalation. Education provided regarding infection prevention, and signs and symptoms of COVID-19 and when to seek medical attention with patient who verbalized understanding. Discussed exposure protocols and quarantine from [address] you at higher risk for severe illness  and given an opportunity for questions and concerns. The patient agrees to contact the COVID-19 hotline [phone] or PCP office for questions related to their healthcare. CTN provided contact information for future reference. From CDC: Are you at higher risk for severe illness?  Wash your hands often.  Avoid close contact (6 feet, which is about two arm lengths) with people who are sick.  Put distance between yourself and other people if COVID-19 is spreading in your community.  Clean and disinfect frequently touched surfaces.  Avoid all cruise travel and non-essential air travel.  Call your healthcare professional if you have concerns about COVID-19 and your underlying condition or if you are sick. For more information on steps you can take to protect yourself, see CDC's How to Gaby for follow-up call in 7-14 days based on severity of symptoms and risk factors.

## 2021-04-27 ENCOUNTER — OFFICE VISIT (OUTPATIENT)
Dept: INTERNAL MEDICINE CLINIC | Age: 61
End: 2021-04-27
Payer: COMMERCIAL

## 2021-04-27 VITALS
HEIGHT: 72 IN | SYSTOLIC BLOOD PRESSURE: 142 MMHG | BODY MASS INDEX: 34.54 KG/M2 | RESPIRATION RATE: 19 BRPM | WEIGHT: 255 LBS | DIASTOLIC BLOOD PRESSURE: 88 MMHG | OXYGEN SATURATION: 94 % | TEMPERATURE: 98.2 F | HEART RATE: 99 BPM

## 2021-04-27 DIAGNOSIS — R06.09 DOE (DYSPNEA ON EXERTION): ICD-10-CM

## 2021-04-27 DIAGNOSIS — Z86.16 HISTORY OF COVID-19: Primary | ICD-10-CM

## 2021-04-27 DIAGNOSIS — E66.9 OBESITY (BMI 30.0-34.9): ICD-10-CM

## 2021-04-27 PROCEDURE — 99214 OFFICE O/P EST MOD 30 MIN: CPT | Performed by: INTERNAL MEDICINE

## 2021-04-27 NOTE — PROGRESS NOTES
Chief Complaint   Patient presents with   [facility] Wil[facility]     1. Have you been to the ER, urgent care clinic since your last visit? Hospitalized since your last visit? Yes When: 3/17/21-3/22/21 Covid 19     2. Have you seen or consulted any other health care providers outside of the [address] [place] [address] since your last visit? Include any pap smears or colon screening.  No

## 2021-04-27 NOTE — PROGRESS NOTES
Yodit Roberts is a 64 y.o. male and presents with Establish Care and GERD (Omeprazole)  . Subjective:    Establish Care  Previous PCP    Pt was admitted to hospital 3/17-3/22/2021 for covid and pneumonia. Pt was tx w Was tx w steroids, supplemental O2, doxycycline and remdesivir. Pt has been out of work since admission. Pt has a very physical job and relays he does not believe he is capable of returning to work full duty. His job does not offer lt duty. He is requesting return to work 6/1/2021. Pt declines PT referral as he is very active at home, just not able to resume active job just yet. No fever/cough/orthopnea/chest pain/le edema. Pt has gained ~20lbs compared to documented weight upon d/c from hospital.    Review of Systems  Review of systems (12) negative, except noted above. Past Medical History:   Diagnosis Date    Gastrointestinal disorder     GERDS    GERD (gastroesophageal reflux disease)     Ill-defined condition     MRSA     Past Surgical History:   Procedure Laterality Date    HX APPENDECTOMY      HX HEENT      right eye surgery    HX OTHER SURGICAL      right eye surgery    HX UROLOGICAL      laser surgery on \"my private parts\"     Social History     Socioeconomic History    Marital status:      Spouse name: Not on file    Number of children: Not on file    Years of education: Not on file    Highest education level: Not on file   Tobacco Use    Smoking status: Never Smoker    Smokeless tobacco: Never Used   Substance and Sexual Activity    Alcohol use: Yes     Comment: wine coolers    Drug use: Yes     Types: Marijuana    Sexual activity: Yes     No family history on file. Current Outpatient Medications   Medication Sig Dispense Refill    naproxen (NAPROSYN) 500 mg tablet Take 1 Tab by mouth two (2) times daily (with meals).  60 Tab 0     No Known Allergies    Objective:  Visit Vitals  BP (!) 142/88 (BP 1 Location: Left upper arm, BP Patient Position: Sitting, BP Cuff Size: Adult)   Pulse 99   Temp 98.2 °F (36.8 °C) (Temporal)   Resp 19   Ht 6' (1.829 m)   Wt 255 lb (115.7 kg)   SpO2 94%   BMI 34.58 kg/m²     Physical Exam:   General appearance - alert, well appearing, and in no distress  Mental status - alert, oriented to person, place, and time  EYE-EOMI  Neck - supple, no significant adenopathy   Chest - clear to auscultation, no wheezes, rales or rhonchi, symmetric air entry   Heart - normal rate, regular rhythm, normal S1, S2  Abdomen - soft, nontender, obese +bs  Ext-peripheral pulses normal, no pedal edema, no clubbing or cyanosis  Skin-Warm and dry. no hyperpigmentation, vitiligo, or suspicious lesions  Neuro -alert, oriented, normal speech, no focal findings or movement disorder noted      Results for orders placed or performed during the hospital encounter of 03/17/21   COVID-19 RAPID TEST   Result Value Ref Range    Specimen source Nasopharyngeal      COVID-19 rapid test Not detected NOTD     SARS-COV-2   Result Value Ref Range    SARS-CoV-2 Please find results under separate order     CBC WITH AUTOMATED DIFF   Result Value Ref Range    WBC 3.9 (L) 4.1 - 11.1 K/uL    RBC 4.42 4.10 - 5.70 M/uL    HGB 13.3 12.1 - 17.0 g/dL    HCT 37.8 36.6 - 50.3 %    MCV 85.5 80.0 - 99.0 FL    MCH 30.1 26.0 - 34.0 PG    MCHC 35.2 30.0 - 36.5 g/dL    RDW 13.4 11.5 - 14.5 %    PLATELET 573 814 - 697 K/uL    MPV 10.8 8.9 - 12.9 FL    NRBC 0.0 0  WBC    ABSOLUTE NRBC 0.00 0.00 - 0.01 K/uL    NEUTROPHILS 78 (H) 32 - 75 %    LYMPHOCYTES 16 12 - 49 %    MONOCYTES 5 5 - 13 %    EOSINOPHILS 0 0 - 7 %    BASOPHILS 0 0 - 1 %    IMMATURE GRANULOCYTES 1 (H) 0.0 - 0.5 %    ABS. NEUTROPHILS 3.1 1.8 - 8.0 K/UL    ABS. LYMPHOCYTES 0.6 (L) 0.8 - 3.5 K/UL    ABS. MONOCYTES 0.2 0.0 - 1.0 K/UL    ABS. EOSINOPHILS 0.0 0.0 - 0.4 K/UL    ABS. BASOPHILS 0.0 0.0 - 0.1 K/UL    ABS. IMM.  GRANS. 0.0 0.00 - 0.04 K/UL    DF SMEAR SCANNED      RBC COMMENTS OVALOCYTES  PRESENT        RBC COMMENTS POLYCHROMASIA  PRESENT        RBC COMMENTS ANISOCYTOSIS  1+       METABOLIC PANEL, COMPREHENSIVE   Result Value Ref Range    Sodium 132 (L) 136 - 145 mmol/L    Potassium 4.1 3.5 - 5.1 mmol/L    Chloride 104 97 - 108 mmol/L    CO2 21 21 - 32 mmol/L    Anion gap 7 5 - 15 mmol/L    Glucose 184 (H) 65 - 100 mg/dL    BUN 15 6 - 20 MG/DL    Creatinine 1.02 0.70 - 1.30 MG/DL    BUN/Creatinine ratio 15 12 - 20      GFR est AA >60 >60 ml/min/1.73m2    GFR est non-AA >60 >60 ml/min/1.73m2    Calcium 8.8 8.5 - 10.1 MG/DL    Bilirubin, total 0.8 0.2 - 1.0 MG/DL    ALT (SGPT) 58 12 - 78 U/L    AST (SGOT) 72 (H) 15 - 37 U/L    Alk. phosphatase 43 (L) 45 - 117 U/L    Protein, total 7.9 6.4 - 8.2 g/dL    Albumin 3.0 (L) 3.5 - 5.0 g/dL    Globulin 4.9 (H) 2.0 - 4.0 g/dL    A-G Ratio 0.6 (L) 1.1 - 2.2     PROCALCITONIN   Result Value Ref Range    Procalcitonin <0.05 ng/mL   FERRITIN   Result Value Ref Range    Ferritin 474 (H) 26 - 388 NG/ML   D DIMER   Result Value Ref Range    D-dimer 0.56 0.00 - 0.65 mg/L FEU   SARS-COV-2   Result Value Ref Range    SARS-CoV-2 Please find results under separate order     SARS-COV-2   Result Value Ref Range    Specimen source Nasopharyngeal      SARS-CoV-2 Detected (AA) NOTD     CBC WITH AUTOMATED DIFF   Result Value Ref Range    WBC 11.7 (H) 4.1 - 11.1 K/uL    RBC 4.56 4.10 - 5.70 M/uL    HGB 14.0 12.1 - 17.0 g/dL    HCT 39.7 36.6 - 50.3 %    MCV 87.1 80.0 - 99.0 FL    MCH 30.7 26.0 - 34.0 PG    MCHC 35.3 30.0 - 36.5 g/dL    RDW 13.3 11.5 - 14.5 %    PLATELET 988 081 - 091 K/uL    MPV 11.1 8.9 - 12.9 FL    NRBC 0.0 0  WBC    ABSOLUTE NRBC 0.00 0.00 - 0.01 K/uL    NEUTROPHILS 79 (H) 32 - 75 %    LYMPHOCYTES 9 (L) 12 - 49 %    MONOCYTES 11 5 - 13 %    EOSINOPHILS 0 0 - 7 %    BASOPHILS 0 0 - 1 %    IMMATURE GRANULOCYTES 1 (H) 0.0 - 0.5 %    ABS. NEUTROPHILS 9.3 (H) 1.8 - 8.0 K/UL    ABS. LYMPHOCYTES 1.1 0.8 - 3.5 K/UL    ABS. MONOCYTES 1.3 (H) 0.0 - 1.0 K/UL    ABS.  EOSINOPHILS 0.0 0.0 - 0.4 K/UL    ABS. BASOPHILS 0.0 0.0 - 0.1 K/UL    ABS. IMM. GRANS. 0.1 (H) 0.00 - 0.04 K/UL    DF AUTOMATED     METABOLIC PANEL, COMPREHENSIVE   Result Value Ref Range    Sodium 135 (L) 136 - 145 mmol/L    Potassium 4.5 3.5 - 5.1 mmol/L    Chloride 104 97 - 108 mmol/L    CO2 25 21 - 32 mmol/L    Anion gap 6 5 - 15 mmol/L    Glucose 130 (H) 65 - 100 mg/dL    BUN 16 6 - 20 MG/DL    Creatinine 1.06 0.70 - 1.30 MG/DL    BUN/Creatinine ratio 15 12 - 20      GFR est AA >60 >60 ml/min/1.73m2    GFR est non-AA >60 >60 ml/min/1.73m2    Calcium 8.9 8.5 - 10.1 MG/DL    Bilirubin, total 0.6 0.2 - 1.0 MG/DL    ALT (SGPT) 53 12 - 78 U/L    AST (SGOT) 56 (H) 15 - 37 U/L    Alk. phosphatase 45 45 - 117 U/L    Protein, total 8.3 (H) 6.4 - 8.2 g/dL    Albumin 3.2 (L) 3.5 - 5.0 g/dL    Globulin 5.1 (H) 2.0 - 4.0 g/dL    A-G Ratio 0.6 (L) 1.1 - 2.2     METABOLIC PANEL, COMPREHENSIVE   Result Value Ref Range    Sodium 136 136 - 145 mmol/L    Potassium 4.1 3.5 - 5.1 mmol/L    Chloride 107 97 - 108 mmol/L    CO2 23 21 - 32 mmol/L    Anion gap 6 5 - 15 mmol/L    Glucose 151 (H) 65 - 100 mg/dL    BUN 14 6 - 20 MG/DL    Creatinine 1.04 0.70 - 1.30 MG/DL    BUN/Creatinine ratio 13 12 - 20      GFR est AA >60 >60 ml/min/1.73m2    GFR est non-AA >60 >60 ml/min/1.73m2    Calcium 8.3 (L) 8.5 - 10.1 MG/DL    Bilirubin, total 0.6 0.2 - 1.0 MG/DL    ALT (SGPT) 49 12 - 78 U/L    AST (SGOT) 44 (H) 15 - 37 U/L    Alk.  phosphatase 49 45 - 117 U/L    Protein, total 7.7 6.4 - 8.2 g/dL    Albumin 2.9 (L) 3.5 - 5.0 g/dL    Globulin 4.8 (H) 2.0 - 4.0 g/dL    A-G Ratio 0.6 (L) 1.1 - 2.2     CBC W/O DIFF   Result Value Ref Range    WBC 14.5 (H) 4.1 - 11.1 K/uL    RBC 4.37 4.10 - 5.70 M/uL    HGB 13.4 12.1 - 17.0 g/dL    HCT 37.3 36.6 - 50.3 %    MCV 85.4 80.0 - 99.0 FL    MCH 30.7 26.0 - 34.0 PG    MCHC 35.9 30.0 - 36.5 g/dL    RDW 13.5 11.5 - 14.5 %    PLATELET 616 555 - 350 K/uL    MPV 10.1 8.9 - 12.9 FL    NRBC 0.0 0  WBC    ABSOLUTE NRBC 0.00 0.00 - 0.01 K/uL   CBC WITH AUTOMATED DIFF   Result Value Ref Range    WBC 14.2 (H) 4.1 - 11.1 K/uL    RBC 4.45 4.10 - 5.70 M/uL    HGB 13.6 12.1 - 17.0 g/dL    HCT 38.3 36.6 - 50.3 %    MCV 86.1 80.0 - 99.0 FL    MCH 30.6 26.0 - 34.0 PG    MCHC 35.5 30.0 - 36.5 g/dL    RDW 13.5 11.5 - 14.5 %    PLATELET 196 349 - 530 K/uL    MPV 10.3 8.9 - 12.9 FL    NRBC 0.0 0  WBC    ABSOLUTE NRBC 0.00 0.00 - 0.01 K/uL    NEUTROPHILS 74 32 - 75 %    BAND NEUTROPHILS 5 %    LYMPHOCYTES 15 12 - 49 %    MONOCYTES 6 5 - 13 %    EOSINOPHILS 0 0 - 7 %    BASOPHILS 0 0 - 1 %    IMMATURE GRANULOCYTES 0 0.0 - 0.5 %    ABS. NEUTROPHILS 11.2 (H) 1.8 - 8.0 K/UL    ABS. LYMPHOCYTES 2.1 0.8 - 3.5 K/UL    ABS. MONOCYTES 0.9 0.0 - 1.0 K/UL    ABS. EOSINOPHILS 0.0 0.0 - 0.4 K/UL    ABS. BASOPHILS 0.0 0.0 - 0.1 K/UL    ABS. IMM. GRANS. 0.0 0.00 - 0.04 K/UL    DF MANUAL      RBC COMMENTS OVALOCYTES  PRESENT        WBC COMMENTS REACTIVE LYMPHS     METABOLIC PANEL, COMPREHENSIVE   Result Value Ref Range    Sodium 136 136 - 145 mmol/L    Potassium 4.6 3.5 - 5.1 mmol/L    Chloride 107 97 - 108 mmol/L    CO2 24 21 - 32 mmol/L    Anion gap 5 5 - 15 mmol/L    Glucose 116 (H) 65 - 100 mg/dL    BUN 14 6 - 20 MG/DL    Creatinine 0.97 0.70 - 1.30 MG/DL    BUN/Creatinine ratio 14 12 - 20      GFR est AA >60 >60 ml/min/1.73m2    GFR est non-AA >60 >60 ml/min/1.73m2    Calcium 8.5 8.5 - 10.1 MG/DL    Bilirubin, total 0.7 0.2 - 1.0 MG/DL    ALT (SGPT) 47 12 - 78 U/L    AST (SGOT) 34 15 - 37 U/L    Alk.  phosphatase 51 45 - 117 U/L    Protein, total 7.5 6.4 - 8.2 g/dL    Albumin 2.8 (L) 3.5 - 5.0 g/dL    Globulin 4.7 (H) 2.0 - 4.0 g/dL    A-G Ratio 0.6 (L) 1.1 - 2.2     METABOLIC PANEL, COMPREHENSIVE   Result Value Ref Range    Sodium 134 (L) 136 - 145 mmol/L    Potassium 4.1 3.5 - 5.1 mmol/L    Chloride 105 97 - 108 mmol/L    CO2 21 21 - 32 mmol/L    Anion gap 8 5 - 15 mmol/L    Glucose 187 (H) 65 - 100 mg/dL    BUN 16 6 - 20 MG/DL    Creatinine 1.02 0.70 - 1.30 MG/DL BUN/Creatinine ratio 16 12 - 20      GFR est AA >60 >60 ml/min/1.73m2    GFR est non-AA >60 >60 ml/min/1.73m2    Calcium 8.8 8.5 - 10.1 MG/DL    Bilirubin, total 0.6 0.2 - 1.0 MG/DL    ALT (SGPT) 47 12 - 78 U/L    AST (SGOT) 27 15 - 37 U/L    Alk. phosphatase 51 45 - 117 U/L    Protein, total 7.4 6.4 - 8.2 g/dL    Albumin 2.8 (L) 3.5 - 5.0 g/dL    Globulin 4.6 (H) 2.0 - 4.0 g/dL    A-G Ratio 0.6 (L) 1.1 - 2.2         Assessment/Plan:    ICD-10-CM ICD-9-CM    1. History of COVID-19  Z86.16 V12.09    2. FORREST (dyspnea on exertion)  R06.00 786.09    3. Obesity (BMI 30.0-34. 9)  E66.9 278.00      No orders of the defined types were placed in this encounter.    -pt requests more time to recuperate  -pt declines PT  -pt encouraged to walk daily and remain active  -clinically appears improved from virtual visit 1 month ago    There are no Patient Instructions on file for this visit. Follow-up and Dispositions    · Return in about 4 weeks (around 5/25/2021). I have reviewed with the patient details of the assessment and plan and all questions were answered. Relevent patient education was performed. The most recent lab findings were reviewed with the patient. An After Visit Summary was printed and given to the patient.

## 2021-04-29 DIAGNOSIS — K21.9 GASTROESOPHAGEAL REFLUX DISEASE, UNSPECIFIED WHETHER ESOPHAGITIS PRESENT: Primary | ICD-10-CM

## 2021-04-29 RX ORDER — FAMOTIDINE 40 MG/1
40 TABLET, FILM COATED ORAL
Qty: 30 TAB | Refills: 2 | Status: SHIPPED | OUTPATIENT
Start: 2021-04-29 | End: 2021-06-01

## 2021-06-01 ENCOUNTER — OFFICE VISIT (OUTPATIENT)
Dept: INTERNAL MEDICINE CLINIC | Age: 61
End: 2021-06-01
Payer: COMMERCIAL

## 2021-06-01 VITALS
HEART RATE: 109 BPM | OXYGEN SATURATION: 93 % | WEIGHT: 263 LBS | DIASTOLIC BLOOD PRESSURE: 114 MMHG | RESPIRATION RATE: 19 BRPM | BODY MASS INDEX: 35.62 KG/M2 | HEIGHT: 72 IN | TEMPERATURE: 98.1 F | SYSTOLIC BLOOD PRESSURE: 165 MMHG

## 2021-06-01 DIAGNOSIS — K21.00 GASTROESOPHAGEAL REFLUX DISEASE WITH ESOPHAGITIS WITHOUT HEMORRHAGE: ICD-10-CM

## 2021-06-01 DIAGNOSIS — E66.9 OBESITY (BMI 35.0-39.9 WITHOUT COMORBIDITY): ICD-10-CM

## 2021-06-01 DIAGNOSIS — I10 ESSENTIAL HYPERTENSION: Primary | ICD-10-CM

## 2021-06-01 PROCEDURE — 99214 OFFICE O/P EST MOD 30 MIN: CPT | Performed by: INTERNAL MEDICINE

## 2021-06-01 RX ORDER — AMLODIPINE AND BENAZEPRIL HYDROCHLORIDE 10; 20 MG/1; MG/1
1 CAPSULE ORAL DAILY
Qty: 60 CAPSULE | Refills: 1 | Status: SHIPPED | OUTPATIENT
Start: 2021-06-01 | End: 2022-01-03

## 2021-06-01 RX ORDER — OMEPRAZOLE 20 MG/1
20 CAPSULE, DELAYED RELEASE ORAL DAILY
Qty: 30 CAPSULE | Refills: 2 | Status: SHIPPED | OUTPATIENT
Start: 2021-06-01 | End: 2021-11-12 | Stop reason: SDUPTHER

## 2021-06-01 NOTE — PROGRESS NOTES
Chief Complaint   Patient presents with    Blood Pressure Check     1. Have you been to the ER, urgent care clinic since your last visit? Hospitalized since your last visit? No    2. Have you seen or consulted any other health care providers outside of the [address] [place] [address] since your last visit? Include any pap smears or colon screening.  No

## 2021-06-01 NOTE — PROGRESS NOTES
Dayana Gonzales is a 63 y.o. male and presents with Blood Pressure Check and Request For New Medication (Omeprozole -Famotadine not effective)  . Subjective:        Pt is ready to rtw. He requests rtw letter specify that he works an 8 hour shift. Pts bp has been elevated x yrs as per his chsrt review. He never discussed bp medication w his former PCP Dr. Katty Cardoso    BP Readings from Last 3 Encounters:   06/01/21 (!) 165/114   04/27/21 (!) 142/88   03/22/21 (!) 139/90       Pt reports pepcid does not work for his GERD. He requests ompeprazole. PMH:  Pt was admitted to hospital 3/17-3/22/2021 for covid and pneumonia. Pt was tx w Was tx w steroids, supplemental O2, doxycycline and remdesivir. Pt has been out of work since admission. Pt has a very physical job and relays he does not believe he is capable of returning to work full duty. His job does not offer lt duty. He is requesting return to work 6/1/2021. Pt declines PT referral as he is very active at home, just not able to resume active job just yet. No fever/cough/orthopnea/chest pain/le edema. Pt has gained ~20lbs compared to documented weight upon d/c from hospital.    Review of Systems  Review of systems (12) negative, except noted above.       Past Medical History:   Diagnosis Date    Gastrointestinal disorder     GERDS    GERD (gastroesophageal reflux disease)     Ill-defined condition     MRSA     Past Surgical History:   Procedure Laterality Date    HX APPENDECTOMY      HX HEENT      right eye surgery    HX OTHER SURGICAL      right eye surgery    HX UROLOGICAL      laser surgery on \"my private parts\"     Social History     Socioeconomic History    Marital status:      Spouse name: Not on file    Number of children: Not on file    Years of education: Not on file    Highest education level: Not on file   Tobacco Use    Smoking status: Never Smoker    Smokeless tobacco: Never Used   Substance and Sexual Activity    Alcohol use: Yes     Comment: wine coolers    Drug use: Yes     Types: Marijuana    Sexual activity: Yes     Social Determinants of Health     Financial Resource Strain:     Difficulty of Paying Living Expenses:    Food Insecurity:     Worried About Running Out of Food in the Last Year:     [address] in the Last Year:    Transportation Needs:     Lack of Transportation (Medical):  Lack of Transportation (Non-Medical):    Physical Activity:     Days of Exercise per Week:     Minutes of Exercise per Session:    Stress:     Feeling of Stress :    Social Connections:     Frequency of Communication with Friends and Family:     Frequency of Social Gatherings with Friends and Family:     Attends Buddhism Services:     Active Member of Clubs or Organizations:     Attends Club or Organization Meetings:     Marital Status:      No family history on file. Current Outpatient Medications   Medication Sig Dispense Refill    amLODIPine-benazepril (LOTREL) 10-20 mg per capsule Take 1 Capsule by mouth daily. 60 Capsule 1    omeprazole (PRILOSEC) 20 mg capsule Take 1 Capsule by mouth daily. 30 Capsule 2    naproxen (NAPROSYN) 500 mg tablet Take 1 Tab by mouth two (2) times daily (with meals).  60 Tab 0     No Known Allergies    Objective:  Visit Vitals  BP (!) 165/114 (BP 1 Location: Left upper arm, BP Patient Position: Sitting, BP Cuff Size: Adult long)   Pulse (!) 109   Temp 98.1 °F (36.7 °C) (Temporal)   Resp 19   Ht 6' (1.829 m)   Wt 263 lb (119.3 kg)   SpO2 93%   BMI 35.67 kg/m²     Physical Exam:   General appearance - alert, well appearing, and in no distress  Mental status - alert, oriented to person, place, and time  EYE-EOMI  Neck - supple, no significant adenopathy   Chest - clear to auscultation, no wheezes, rales or rhonchi, symmetric air entry   Heart - normal rate, regular rhythm, normal S1, S2  Abdomen - soft, nontender, obese +bs  Ext-peripheral pulses normal, no pedal edema, no clubbing or cyanosis  Skin-Warm and dry. no hyperpigmentation, vitiligo, or suspicious lesions  Neuro -alert, oriented, normal speech, no focal findings or movement disorder noted      Results for orders placed or performed during the hospital encounter of 03/17/21   COVID-19 RAPID TEST   Result Value Ref Range    Specimen source Nasopharyngeal      COVID-19 rapid test Not detected NOTD     SARS-COV-2   Result Value Ref Range    SARS-CoV-2 Please find results under separate order     CBC WITH AUTOMATED DIFF   Result Value Ref Range    WBC 3.9 (L) 4.1 - 11.1 K/uL    RBC 4.42 4.10 - 5.70 M/uL    HGB 13.3 12.1 - 17.0 g/dL    HCT 37.8 36.6 - 50.3 %    MCV 85.5 80.0 - 99.0 FL    MCH 30.1 26.0 - 34.0 PG    MCHC 35.2 30.0 - 36.5 g/dL    RDW 13.4 11.5 - 14.5 %    PLATELET 570 409 - 300 K/uL    MPV 10.8 8.9 - 12.9 FL    NRBC 0.0 0  WBC    ABSOLUTE NRBC 0.00 0.00 - 0.01 K/uL    NEUTROPHILS 78 (H) 32 - 75 %    LYMPHOCYTES 16 12 - 49 %    MONOCYTES 5 5 - 13 %    EOSINOPHILS 0 0 - 7 %    BASOPHILS 0 0 - 1 %    IMMATURE GRANULOCYTES 1 (H) 0.0 - 0.5 %    ABS. NEUTROPHILS 3.1 1.8 - 8.0 K/UL    ABS. LYMPHOCYTES 0.6 (L) 0.8 - 3.5 K/UL    ABS. MONOCYTES 0.2 0.0 - 1.0 K/UL    ABS. EOSINOPHILS 0.0 0.0 - 0.4 K/UL    ABS. BASOPHILS 0.0 0.0 - 0.1 K/UL    ABS. IMM.  GRANS. 0.0 0.00 - 0.04 K/UL    DF SMEAR SCANNED      RBC COMMENTS OVALOCYTES  PRESENT        RBC COMMENTS POLYCHROMASIA  PRESENT        RBC COMMENTS ANISOCYTOSIS  1+       METABOLIC PANEL, COMPREHENSIVE   Result Value Ref Range    Sodium 132 (L) 136 - 145 mmol/L    Potassium 4.1 3.5 - 5.1 mmol/L    Chloride 104 97 - 108 mmol/L    CO2 21 21 - 32 mmol/L    Anion gap 7 5 - 15 mmol/L    Glucose 184 (H) 65 - 100 mg/dL    BUN 15 6 - 20 MG/DL    Creatinine 1.02 0.70 - 1.30 MG/DL    BUN/Creatinine ratio 15 12 - 20      GFR est AA >60 >60 ml/min/1.73m2    GFR est non-AA >60 >60 ml/min/1.73m2    Calcium 8.8 8.5 - 10.1 MG/DL    Bilirubin, total 0.8 0.2 - 1.0 MG/DL    ALT (SGPT) 58 12 - 78 U/L AST (SGOT) 72 (H) 15 - 37 U/L    Alk. phosphatase 43 (L) 45 - 117 U/L    Protein, total 7.9 6.4 - 8.2 g/dL    Albumin 3.0 (L) 3.5 - 5.0 g/dL    Globulin 4.9 (H) 2.0 - 4.0 g/dL    A-G Ratio 0.6 (L) 1.1 - 2.2     PROCALCITONIN   Result Value Ref Range    Procalcitonin <0.05 ng/mL   FERRITIN   Result Value Ref Range    Ferritin 474 (H) 26 - 388 NG/ML   D DIMER   Result Value Ref Range    D-dimer 0.56 0.00 - 0.65 mg/L FEU   SARS-COV-2   Result Value Ref Range    SARS-CoV-2 Please find results under separate order     SARS-COV-2   Result Value Ref Range    Specimen source Nasopharyngeal      SARS-CoV-2 Detected (AA) NOTD     CBC WITH AUTOMATED DIFF   Result Value Ref Range    WBC 11.7 (H) 4.1 - 11.1 K/uL    RBC 4.56 4.10 - 5.70 M/uL    HGB 14.0 12.1 - 17.0 g/dL    HCT 39.7 36.6 - 50.3 %    MCV 87.1 80.0 - 99.0 FL    MCH 30.7 26.0 - 34.0 PG    MCHC 35.3 30.0 - 36.5 g/dL    RDW 13.3 11.5 - 14.5 %    PLATELET 257 736 - 311 K/uL    MPV 11.1 8.9 - 12.9 FL    NRBC 0.0 0  WBC    ABSOLUTE NRBC 0.00 0.00 - 0.01 K/uL    NEUTROPHILS 79 (H) 32 - 75 %    LYMPHOCYTES 9 (L) 12 - 49 %    MONOCYTES 11 5 - 13 %    EOSINOPHILS 0 0 - 7 %    BASOPHILS 0 0 - 1 %    IMMATURE GRANULOCYTES 1 (H) 0.0 - 0.5 %    ABS. NEUTROPHILS 9.3 (H) 1.8 - 8.0 K/UL    ABS. LYMPHOCYTES 1.1 0.8 - 3.5 K/UL    ABS. MONOCYTES 1.3 (H) 0.0 - 1.0 K/UL    ABS. EOSINOPHILS 0.0 0.0 - 0.4 K/UL    ABS. BASOPHILS 0.0 0.0 - 0.1 K/UL    ABS. IMM.  GRANS. 0.1 (H) 0.00 - 0.04 K/UL    DF AUTOMATED     METABOLIC PANEL, COMPREHENSIVE   Result Value Ref Range    Sodium 135 (L) 136 - 145 mmol/L    Potassium 4.5 3.5 - 5.1 mmol/L    Chloride 104 97 - 108 mmol/L    CO2 25 21 - 32 mmol/L    Anion gap 6 5 - 15 mmol/L    Glucose 130 (H) 65 - 100 mg/dL    BUN 16 6 - 20 MG/DL    Creatinine 1.06 0.70 - 1.30 MG/DL    BUN/Creatinine ratio 15 12 - 20      GFR est AA >60 >60 ml/min/1.73m2    GFR est non-AA >60 >60 ml/min/1.73m2    Calcium 8.9 8.5 - 10.1 MG/DL    Bilirubin, total 0.6 0.2 - 1.0 MG/DL    ALT (SGPT) 53 12 - 78 U/L    AST (SGOT) 56 (H) 15 - 37 U/L    Alk. phosphatase 45 45 - 117 U/L    Protein, total 8.3 (H) 6.4 - 8.2 g/dL    Albumin 3.2 (L) 3.5 - 5.0 g/dL    Globulin 5.1 (H) 2.0 - 4.0 g/dL    A-G Ratio 0.6 (L) 1.1 - 2.2     METABOLIC PANEL, COMPREHENSIVE   Result Value Ref Range    Sodium 136 136 - 145 mmol/L    Potassium 4.1 3.5 - 5.1 mmol/L    Chloride 107 97 - 108 mmol/L    CO2 23 21 - 32 mmol/L    Anion gap 6 5 - 15 mmol/L    Glucose 151 (H) 65 - 100 mg/dL    BUN 14 6 - 20 MG/DL    Creatinine 1.04 0.70 - 1.30 MG/DL    BUN/Creatinine ratio 13 12 - 20      GFR est AA >60 >60 ml/min/1.73m2    GFR est non-AA >60 >60 ml/min/1.73m2    Calcium 8.3 (L) 8.5 - 10.1 MG/DL    Bilirubin, total 0.6 0.2 - 1.0 MG/DL    ALT (SGPT) 49 12 - 78 U/L    AST (SGOT) 44 (H) 15 - 37 U/L    Alk.  phosphatase 49 45 - 117 U/L    Protein, total 7.7 6.4 - 8.2 g/dL    Albumin 2.9 (L) 3.5 - 5.0 g/dL    Globulin 4.8 (H) 2.0 - 4.0 g/dL    A-G Ratio 0.6 (L) 1.1 - 2.2     CBC W/O DIFF   Result Value Ref Range    WBC 14.5 (H) 4.1 - 11.1 K/uL    RBC 4.37 4.10 - 5.70 M/uL    HGB 13.4 12.1 - 17.0 g/dL    HCT 37.3 36.6 - 50.3 %    MCV 85.4 80.0 - 99.0 FL    MCH 30.7 26.0 - 34.0 PG    MCHC 35.9 30.0 - 36.5 g/dL    RDW 13.5 11.5 - 14.5 %    PLATELET 778 115 - 881 K/uL    MPV 10.1 8.9 - 12.9 FL    NRBC 0.0 0  WBC    ABSOLUTE NRBC 0.00 0.00 - 0.01 K/uL   CBC WITH AUTOMATED DIFF   Result Value Ref Range    WBC 14.2 (H) 4.1 - 11.1 K/uL    RBC 4.45 4.10 - 5.70 M/uL    HGB 13.6 12.1 - 17.0 g/dL    HCT 38.3 36.6 - 50.3 %    MCV 86.1 80.0 - 99.0 FL    MCH 30.6 26.0 - 34.0 PG    MCHC 35.5 30.0 - 36.5 g/dL    RDW 13.5 11.5 - 14.5 %    PLATELET 593 077 - 153 K/uL    MPV 10.3 8.9 - 12.9 FL    NRBC 0.0 0  WBC    ABSOLUTE NRBC 0.00 0.00 - 0.01 K/uL    NEUTROPHILS 74 32 - 75 %    BAND NEUTROPHILS 5 %    LYMPHOCYTES 15 12 - 49 %    MONOCYTES 6 5 - 13 %    EOSINOPHILS 0 0 - 7 %    BASOPHILS 0 0 - 1 %    IMMATURE GRANULOCYTES 0 0.0 - 0.5 %    ABS. NEUTROPHILS 11.2 (H) 1.8 - 8.0 K/UL    ABS. LYMPHOCYTES 2.1 0.8 - 3.5 K/UL    ABS. MONOCYTES 0.9 0.0 - 1.0 K/UL    ABS. EOSINOPHILS 0.0 0.0 - 0.4 K/UL    ABS. BASOPHILS 0.0 0.0 - 0.1 K/UL    ABS. IMM. GRANS. 0.0 0.00 - 0.04 K/UL    DF MANUAL      RBC COMMENTS OVALOCYTES  PRESENT        WBC COMMENTS REACTIVE LYMPHS     METABOLIC PANEL, COMPREHENSIVE   Result Value Ref Range    Sodium 136 136 - 145 mmol/L    Potassium 4.6 3.5 - 5.1 mmol/L    Chloride 107 97 - 108 mmol/L    CO2 24 21 - 32 mmol/L    Anion gap 5 5 - 15 mmol/L    Glucose 116 (H) 65 - 100 mg/dL    BUN 14 6 - 20 MG/DL    Creatinine 0.97 0.70 - 1.30 MG/DL    BUN/Creatinine ratio 14 12 - 20      GFR est AA >60 >60 ml/min/1.73m2    GFR est non-AA >60 >60 ml/min/1.73m2    Calcium 8.5 8.5 - 10.1 MG/DL    Bilirubin, total 0.7 0.2 - 1.0 MG/DL    ALT (SGPT) 47 12 - 78 U/L    AST (SGOT) 34 15 - 37 U/L    Alk. phosphatase 51 45 - 117 U/L    Protein, total 7.5 6.4 - 8.2 g/dL    Albumin 2.8 (L) 3.5 - 5.0 g/dL    Globulin 4.7 (H) 2.0 - 4.0 g/dL    A-G Ratio 0.6 (L) 1.1 - 2.2     METABOLIC PANEL, COMPREHENSIVE   Result Value Ref Range    Sodium 134 (L) 136 - 145 mmol/L    Potassium 4.1 3.5 - 5.1 mmol/L    Chloride 105 97 - 108 mmol/L    CO2 21 21 - 32 mmol/L    Anion gap 8 5 - 15 mmol/L    Glucose 187 (H) 65 - 100 mg/dL    BUN 16 6 - 20 MG/DL    Creatinine 1.02 0.70 - 1.30 MG/DL    BUN/Creatinine ratio 16 12 - 20      GFR est AA >60 >60 ml/min/1.73m2    GFR est non-AA >60 >60 ml/min/1.73m2    Calcium 8.8 8.5 - 10.1 MG/DL    Bilirubin, total 0.6 0.2 - 1.0 MG/DL    ALT (SGPT) 47 12 - 78 U/L    AST (SGOT) 27 15 - 37 U/L    Alk. phosphatase 51 45 - 117 U/L    Protein, total 7.4 6.4 - 8.2 g/dL    Albumin 2.8 (L) 3.5 - 5.0 g/dL    Globulin 4.6 (H) 2.0 - 4.0 g/dL    A-G Ratio 0.6 (L) 1.1 - 2.2         Assessment/Plan:    ICD-10-CM ICD-9-CM    1. Essential hypertension  I10 401.9 amLODIPine-benazepril (LOTREL) 10-20 mg per capsule   2.  Gastroesophageal reflux disease with esophagitis without hemorrhage  K21.00 530.81 omeprazole (PRILOSEC) 20 mg capsule     530.10    3. Obesity (BMI 35.0-39.9 without comorbidity)  E66.9 278.00      Orders Placed This Encounter    amLODIPine-benazepril (LOTREL) 10-20 mg per capsule     Sig: Take 1 Capsule by mouth daily. Dispense:  60 Capsule     Refill:  1    omeprazole (PRILOSEC) 20 mg capsule     Sig: Take 1 Capsule by mouth daily. Dispense:  30 Capsule     Refill:  2     1. Essential hypertension  Start med  D/w pt wt loss and low salt diet  - amLODIPine-benazepril (LOTREL) 10-20 mg per capsule; Take 1 Capsule by mouth daily. Dispense: 60 Capsule; Refill: 1    2. Gastroesophageal reflux disease with esophagitis without hemorrhage    - omeprazole (PRILOSEC) 20 mg capsule; Take 1 Capsule by mouth daily. Dispense: 30 Capsule; Refill: 2    3. Obesity (BMI 35.0-39.9 without comorbidity)  D/w pt daily walking (at least 20 minutes), healthy diet w fruits and/or veggies w each meal, portion sizes and weight loss        There are no Patient Instructions on file for this visit. Follow-up and Dispositions    · Return in about 6 weeks (around 7/13/2021) for 30 minute appt. I have reviewed with the patient details of the assessment and plan and all questions were answered. Relevent patient education was performed. The most recent lab findings were reviewed with the patient. An After Visit Summary was printed and given to the patient.

## 2021-06-01 NOTE — LETTER
NOTIFICATION RETURN TO WORK / SCHOOL 
 
6/1/2021 2:19 PM 
 
Mr. Ayad Castro [address] [place] [id number] To Whom It May Concern: 
 
Ayad Castro is currently under the care of Calli Mcneill. He will return to work/school on: 6/7/2021. Pt will need to limit his time to 8 hour days. If there are questions or concerns please have the patient contact our office. Sincerely, Chriss Motley MD

## 2021-07-30 ENCOUNTER — OFFICE VISIT (OUTPATIENT)
Dept: INTERNAL MEDICINE CLINIC | Age: 61
End: 2021-07-30
Payer: COMMERCIAL

## 2021-07-30 VITALS
DIASTOLIC BLOOD PRESSURE: 100 MMHG | HEART RATE: 94 BPM | HEIGHT: 72 IN | OXYGEN SATURATION: 98 % | WEIGHT: 259 LBS | BODY MASS INDEX: 35.08 KG/M2 | RESPIRATION RATE: 19 BRPM | SYSTOLIC BLOOD PRESSURE: 142 MMHG | TEMPERATURE: 98.3 F

## 2021-07-30 DIAGNOSIS — R06.2 WHEEZING: ICD-10-CM

## 2021-07-30 DIAGNOSIS — K21.00 GASTROESOPHAGEAL REFLUX DISEASE WITH ESOPHAGITIS WITHOUT HEMORRHAGE: ICD-10-CM

## 2021-07-30 DIAGNOSIS — I10 ESSENTIAL HYPERTENSION: Primary | ICD-10-CM

## 2021-07-30 DIAGNOSIS — E66.9 OBESITY (BMI 35.0-39.9 WITHOUT COMORBIDITY): ICD-10-CM

## 2021-07-30 DIAGNOSIS — Z86.16 HISTORY OF COVID-19: ICD-10-CM

## 2021-07-30 PROCEDURE — 99214 OFFICE O/P EST MOD 30 MIN: CPT | Performed by: INTERNAL MEDICINE

## 2021-07-30 RX ORDER — ALBUTEROL SULFATE 90 UG/1
1 AEROSOL, METERED RESPIRATORY (INHALATION)
Qty: 1 INHALER | Refills: 5 | Status: SHIPPED | OUTPATIENT
Start: 2021-07-30

## 2021-07-30 RX ORDER — OMEPRAZOLE 20 MG/1
20 CAPSULE, DELAYED RELEASE ORAL DAILY
Qty: 30 CAPSULE | Refills: 2 | Status: CANCELLED | OUTPATIENT
Start: 2021-07-30

## 2021-07-30 NOTE — PROGRESS NOTES
Hailee Reeder is a 63 y.o. male and presents with Hypertension  . Subjective:    Pt admits to non-compliance w medication. He forgets it ~50% of the time. 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 He sometimes wheezes and requests continue to limit 8 hour days. PMH:   HTN-  BP Readings from Last 3 Encounters:   21 (!) 142/100   21 (!) 165/114   21 (!) 142/88      Covid infection and pneumonia 3/2021    PSH- appendectomy    SH-    No tob occas no heroin/cocaine    FH- father  ? ? Mother  80 natural causes   8 siblings 1 sister  aneurysm, 1 brother ESRD due to drug use    HM    Colonoscopy < 10 yrs ago  Immunizations  Eye care  Dental care        Review of Systems  Review of systems (12) negative, except noted above. Past Medical History:   Diagnosis Date    Gastrointestinal disorder     GERDS    GERD (gastroesophageal reflux disease)     Ill-defined condition     MRSA     Past Surgical History:   Procedure Laterality Date    HX APPENDECTOMY      HX HEENT      right eye surgery    HX OTHER SURGICAL      right eye surgery    HX UROLOGICAL      laser surgery on \"my private parts\"     Social History     Socioeconomic History    Marital status:      Spouse name: Not on file    Number of children: Not on file    Years of education: Not on file    Highest education level: Not on file   Tobacco Use    Smoking status: Never Smoker    Smokeless tobacco: Never Used   Vaping Use    Vaping Use: Never used   Substance and Sexual Activity    Alcohol use: Yes     Comment: wine coolers    Drug use: Yes     Types: Marijuana    Sexual activity: Yes     Social Determinants of Health     Financial Resource Strain:     Difficulty of Paying Living Expenses:    Food Insecurity:     Worried About Running Out of Food in the Last Year:     Ran Out of Food in the Last Year:    Transportation Needs:     Lack of Transportation (Medical):      Lack of Transportation (Non-Medical):    Physical Activity:     Days of Exercise per Week:     Minutes of Exercise per Session:    Stress:     Feeling of Stress :    Social Connections:     Frequency of Communication with Friends and Family:     Frequency of Social Gatherings with Friends and Family:     Attends [employer] Services:     Active Member of Clubs or Organizations:     Attends Club or Organization Meetings:     Marital Status:      History reviewed. No pertinent family history. Current Outpatient Medications   Medication Sig Dispense Refill    albuterol (PROVENTIL HFA, VENTOLIN HFA, PROAIR HFA) 90 mcg/actuation inhaler Take 1 Puff by inhalation every six (6) hours as needed for Wheezing. 1 Inhaler 5    amLODIPine-benazepril (LOTREL) 10-20 mg per capsule Take 1 Capsule by mouth daily. 60 Capsule 1    omeprazole (PRILOSEC) 20 mg capsule Take 1 Capsule by mouth daily. 30 Capsule 2    naproxen (NAPROSYN) 500 mg tablet Take 1 Tab by mouth two (2) times daily (with meals). 60 Tab 0     No Known Allergies    Objective:  Visit Vitals  BP (!) 142/100   Pulse 94   Temp 98.3 °F (36.8 °C) (Temporal)   Resp 19   Ht 6' (1.829 m)   Wt 259 lb (117.5 kg)   SpO2 98%   BMI 35.13 kg/m²     Physical Exam:   General appearance - alert, well appearing, and in no distress  Mental status - alert, oriented to person, place, and time  EYE-EOMI  Neck - supple, no significant adenopathy   Chest - clear to auscultation, no wheezes, rales or rhonchi, symmetric air entry   Heart - normal rate, regular rhythm, normal S1, S2  Abdomen - soft, nontender, obese +bs  Ext-peripheral pulses normal, no pedal edema, no clubbing or cyanosis  Skin-Warm and dry.  no hyperpigmentation, vitiligo, or suspicious lesions  Neuro -alert, oriented, normal speech, no focal findings or movement disorder noted      Results for orders placed or performed during the hospital encounter of 03/17/21   COVID-19 RAPID TEST   Result Value Ref Range    Specimen source Nasopharyngeal      COVID-19 rapid test Not detected NOTD     SARS-COV-2   Result Value Ref Range    SARS-CoV-2 Please find results under separate order     CBC WITH AUTOMATED DIFF   Result Value Ref Range    WBC 3.9 (L) 4.1 - 11.1 K/uL    RBC 4.42 4.10 - 5.70 M/uL    HGB 13.3 12.1 - 17.0 g/dL    HCT 37.8 36.6 - 50.3 %    MCV 85.5 80.0 - 99.0 FL    MCH 30.1 26.0 - 34.0 PG    MCHC 35.2 30.0 - 36.5 g/dL    RDW 13.4 11.5 - 14.5 %    PLATELET 632 430 - 281 K/uL    MPV 10.8 8.9 - 12.9 FL    NRBC 0.0 0  WBC    ABSOLUTE NRBC 0.00 0.00 - 0.01 K/uL    NEUTROPHILS 78 (H) 32 - 75 %    LYMPHOCYTES 16 12 - 49 %    MONOCYTES 5 5 - 13 %    EOSINOPHILS 0 0 - 7 %    BASOPHILS 0 0 - 1 %    IMMATURE GRANULOCYTES 1 (H) 0.0 - 0.5 %    ABS. NEUTROPHILS 3.1 1.8 - 8.0 K/UL    ABS. LYMPHOCYTES 0.6 (L) 0.8 - 3.5 K/UL    ABS. MONOCYTES 0.2 0.0 - 1.0 K/UL    ABS. EOSINOPHILS 0.0 0.0 - 0.4 K/UL    ABS. BASOPHILS 0.0 0.0 - 0.1 K/UL    ABS. IMM. GRANS. 0.0 0.00 - 0.04 K/UL    DF SMEAR SCANNED      RBC COMMENTS OVALOCYTES  PRESENT        RBC COMMENTS POLYCHROMASIA  PRESENT        RBC COMMENTS ANISOCYTOSIS  1+       METABOLIC PANEL, COMPREHENSIVE   Result Value Ref Range    Sodium 132 (L) 136 - 145 mmol/L    Potassium 4.1 3.5 - 5.1 mmol/L    Chloride 104 97 - 108 mmol/L    CO2 21 21 - 32 mmol/L    Anion gap 7 5 - 15 mmol/L    Glucose 184 (H) 65 - 100 mg/dL    BUN 15 6 - 20 MG/DL    Creatinine 1.02 0.70 - 1.30 MG/DL    BUN/Creatinine ratio 15 12 - 20      GFR est AA >60 >60 ml/min/1.73m2    GFR est non-AA >60 >60 ml/min/1.73m2    Calcium 8.8 8.5 - 10.1 MG/DL    Bilirubin, total 0.8 0.2 - 1.0 MG/DL    ALT (SGPT) 58 12 - 78 U/L    AST (SGOT) 72 (H) 15 - 37 U/L    Alk.  phosphatase 43 (L) 45 - 117 U/L    Protein, total 7.9 6.4 - 8.2 g/dL    Albumin 3.0 (L) 3.5 - 5.0 g/dL    Globulin 4.9 (H) 2.0 - 4.0 g/dL    A-G Ratio 0.6 (L) 1.1 - 2.2     PROCALCITONIN   Result Value Ref Range    Procalcitonin <0.05 ng/mL   FERRITIN   Result Value Ref Range    Ferritin 474 (H) 26 - 388 NG/ML   D DIMER   Result Value Ref Range    D-dimer 0.56 0.00 - 0.65 mg/L FEU   SARS-COV-2   Result Value Ref Range    SARS-CoV-2 Please find results under separate order     SARS-COV-2   Result Value Ref Range    Specimen source Nasopharyngeal      SARS-CoV-2 Detected (AA) NOTD     CBC WITH AUTOMATED DIFF   Result Value Ref Range    WBC 11.7 (H) 4.1 - 11.1 K/uL    RBC 4.56 4.10 - 5.70 M/uL    HGB 14.0 12.1 - 17.0 g/dL    HCT 39.7 36.6 - 50.3 %    MCV 87.1 80.0 - 99.0 FL    MCH 30.7 26.0 - 34.0 PG    MCHC 35.3 30.0 - 36.5 g/dL    RDW 13.3 11.5 - 14.5 %    PLATELET 117 440 - 353 K/uL    MPV 11.1 8.9 - 12.9 FL    NRBC 0.0 0  WBC    ABSOLUTE NRBC 0.00 0.00 - 0.01 K/uL    NEUTROPHILS 79 (H) 32 - 75 %    LYMPHOCYTES 9 (L) 12 - 49 %    MONOCYTES 11 5 - 13 %    EOSINOPHILS 0 0 - 7 %    BASOPHILS 0 0 - 1 %    IMMATURE GRANULOCYTES 1 (H) 0.0 - 0.5 %    ABS. NEUTROPHILS 9.3 (H) 1.8 - 8.0 K/UL    ABS. LYMPHOCYTES 1.1 0.8 - 3.5 K/UL    ABS. MONOCYTES 1.3 (H) 0.0 - 1.0 K/UL    ABS. EOSINOPHILS 0.0 0.0 - 0.4 K/UL    ABS. BASOPHILS 0.0 0.0 - 0.1 K/UL    ABS. IMM. GRANS. 0.1 (H) 0.00 - 0.04 K/UL    DF AUTOMATED     METABOLIC PANEL, COMPREHENSIVE   Result Value Ref Range    Sodium 135 (L) 136 - 145 mmol/L    Potassium 4.5 3.5 - 5.1 mmol/L    Chloride 104 97 - 108 mmol/L    CO2 25 21 - 32 mmol/L    Anion gap 6 5 - 15 mmol/L    Glucose 130 (H) 65 - 100 mg/dL    BUN 16 6 - 20 MG/DL    Creatinine 1.06 0.70 - 1.30 MG/DL    BUN/Creatinine ratio 15 12 - 20      GFR est AA >60 >60 ml/min/1.73m2    GFR est non-AA >60 >60 ml/min/1.73m2    Calcium 8.9 8.5 - 10.1 MG/DL    Bilirubin, total 0.6 0.2 - 1.0 MG/DL    ALT (SGPT) 53 12 - 78 U/L    AST (SGOT) 56 (H) 15 - 37 U/L    Alk.  phosphatase 45 45 - 117 U/L    Protein, total 8.3 (H) 6.4 - 8.2 g/dL    Albumin 3.2 (L) 3.5 - 5.0 g/dL    Globulin 5.1 (H) 2.0 - 4.0 g/dL    A-G Ratio 0.6 (L) 1.1 - 2.2     METABOLIC PANEL, COMPREHENSIVE   Result Value Ref Range Sodium 136 136 - 145 mmol/L    Potassium 4.1 3.5 - 5.1 mmol/L    Chloride 107 97 - 108 mmol/L    CO2 23 21 - 32 mmol/L    Anion gap 6 5 - 15 mmol/L    Glucose 151 (H) 65 - 100 mg/dL    BUN 14 6 - 20 MG/DL    Creatinine 1.04 0.70 - 1.30 MG/DL    BUN/Creatinine ratio 13 12 - 20      GFR est AA >60 >60 ml/min/1.73m2    GFR est non-AA >60 >60 ml/min/1.73m2    Calcium 8.3 (L) 8.5 - 10.1 MG/DL    Bilirubin, total 0.6 0.2 - 1.0 MG/DL    ALT (SGPT) 49 12 - 78 U/L    AST (SGOT) 44 (H) 15 - 37 U/L    Alk. phosphatase 49 45 - 117 U/L    Protein, total 7.7 6.4 - 8.2 g/dL    Albumin 2.9 (L) 3.5 - 5.0 g/dL    Globulin 4.8 (H) 2.0 - 4.0 g/dL    A-G Ratio 0.6 (L) 1.1 - 2.2     CBC W/O DIFF   Result Value Ref Range    WBC 14.5 (H) 4.1 - 11.1 K/uL    RBC 4.37 4.10 - 5.70 M/uL    HGB 13.4 12.1 - 17.0 g/dL    HCT 37.3 36.6 - 50.3 %    MCV 85.4 80.0 - 99.0 FL    MCH 30.7 26.0 - 34.0 PG    MCHC 35.9 30.0 - 36.5 g/dL    RDW 13.5 11.5 - 14.5 %    PLATELET 344 374 - 725 K/uL    MPV 10.1 8.9 - 12.9 FL    NRBC 0.0 0  WBC    ABSOLUTE NRBC 0.00 0.00 - 0.01 K/uL   CBC WITH AUTOMATED DIFF   Result Value Ref Range    WBC 14.2 (H) 4.1 - 11.1 K/uL    RBC 4.45 4.10 - 5.70 M/uL    HGB 13.6 12.1 - 17.0 g/dL    HCT 38.3 36.6 - 50.3 %    MCV 86.1 80.0 - 99.0 FL    MCH 30.6 26.0 - 34.0 PG    MCHC 35.5 30.0 - 36.5 g/dL    RDW 13.5 11.5 - 14.5 %    PLATELET 612 833 - 141 K/uL    MPV 10.3 8.9 - 12.9 FL    NRBC 0.0 0  WBC    ABSOLUTE NRBC 0.00 0.00 - 0.01 K/uL    NEUTROPHILS 74 32 - 75 %    BAND NEUTROPHILS 5 %    LYMPHOCYTES 15 12 - 49 %    MONOCYTES 6 5 - 13 %    EOSINOPHILS 0 0 - 7 %    BASOPHILS 0 0 - 1 %    IMMATURE GRANULOCYTES 0 0.0 - 0.5 %    ABS. NEUTROPHILS 11.2 (H) 1.8 - 8.0 K/UL    ABS. LYMPHOCYTES 2.1 0.8 - 3.5 K/UL    ABS. MONOCYTES 0.9 0.0 - 1.0 K/UL    ABS. EOSINOPHILS 0.0 0.0 - 0.4 K/UL    ABS. BASOPHILS 0.0 0.0 - 0.1 K/UL    ABS. IMM.  GRANS. 0.0 0.00 - 0.04 K/UL    DF MANUAL      RBC COMMENTS OVALOCYTES  PRESENT        WBC COMMENTS REACTIVE LYMPHS     METABOLIC PANEL, COMPREHENSIVE   Result Value Ref Range    Sodium 136 136 - 145 mmol/L    Potassium 4.6 3.5 - 5.1 mmol/L    Chloride 107 97 - 108 mmol/L    CO2 24 21 - 32 mmol/L    Anion gap 5 5 - 15 mmol/L    Glucose 116 (H) 65 - 100 mg/dL    BUN 14 6 - 20 MG/DL    Creatinine 0.97 0.70 - 1.30 MG/DL    BUN/Creatinine ratio 14 12 - 20      GFR est AA >60 >60 ml/min/1.73m2    GFR est non-AA >60 >60 ml/min/1.73m2    Calcium 8.5 8.5 - 10.1 MG/DL    Bilirubin, total 0.7 0.2 - 1.0 MG/DL    ALT (SGPT) 47 12 - 78 U/L    AST (SGOT) 34 15 - 37 U/L    Alk. phosphatase 51 45 - 117 U/L    Protein, total 7.5 6.4 - 8.2 g/dL    Albumin 2.8 (L) 3.5 - 5.0 g/dL    Globulin 4.7 (H) 2.0 - 4.0 g/dL    A-G Ratio 0.6 (L) 1.1 - 2.2     METABOLIC PANEL, COMPREHENSIVE   Result Value Ref Range    Sodium 134 (L) 136 - 145 mmol/L    Potassium 4.1 3.5 - 5.1 mmol/L    Chloride 105 97 - 108 mmol/L    CO2 21 21 - 32 mmol/L    Anion gap 8 5 - 15 mmol/L    Glucose 187 (H) 65 - 100 mg/dL    BUN 16 6 - 20 MG/DL    Creatinine 1.02 0.70 - 1.30 MG/DL    BUN/Creatinine ratio 16 12 - 20      GFR est AA >60 >60 ml/min/1.73m2    GFR est non-AA >60 >60 ml/min/1.73m2    Calcium 8.8 8.5 - 10.1 MG/DL    Bilirubin, total 0.6 0.2 - 1.0 MG/DL    ALT (SGPT) 47 12 - 78 U/L    AST (SGOT) 27 15 - 37 U/L    Alk. phosphatase 51 45 - 117 U/L    Protein, total 7.4 6.4 - 8.2 g/dL    Albumin 2.8 (L) 3.5 - 5.0 g/dL    Globulin 4.6 (H) 2.0 - 4.0 g/dL    A-G Ratio 0.6 (L) 1.1 - 2.2         Assessment/Plan:    ICD-10-CM ICD-9-CM    1. Essential hypertension  I10 401.9    2. History of COVID-19  Z86.16 V12.09 albuterol (PROVENTIL HFA, VENTOLIN HFA, PROAIR HFA) 90 mcg/actuation inhaler      REFERRAL TO PULMONARY DISEASE   3. Gastroesophageal reflux disease with esophagitis without hemorrhage  K21.00 530.81      530.10    4.  Wheezing  R06.2 786.07 albuterol (PROVENTIL HFA, VENTOLIN HFA, PROAIR HFA) 90 mcg/actuation inhaler      REFERRAL TO PULMONARY DISEASE   5. Obesity (BMI 35.0-39.9 without comorbidity)  E66.9 278.00      Orders Placed This Encounter    REFERRAL TO PULMONARY DISEASE     Referral Priority:   Routine     Referral Type:   Consultation     Referral Reason:   Specialty Services Required     Requested Specialty:   Pulmonary Disease     Number of Visits Requested:   1    albuterol (PROVENTIL HFA, VENTOLIN HFA, PROAIR HFA) 90 mcg/actuation inhaler     Sig: Take 1 Puff by inhalation every six (6) hours as needed for Wheezing. Dispense:  1 Inhaler     Refill:  5       1. Essential hypertension  D/w pt compliance w bp med and risks of end organ damage  Short term f/u and add 2nd agent prn    2. History of COVID-19    - albuterol (PROVENTIL HFA, VENTOLIN HFA, PROAIR HFA) 90 mcg/actuation inhaler; Take 1 Puff by inhalation every six (6) hours as needed for Wheezing. Dispense: 1 Inhaler; Refill: 5  - REFERRAL TO PULMONARY DISEASE    3. Gastroesophageal reflux disease with esophagitis without hemorrhage  On PPI    4. Wheezing    - albuterol (PROVENTIL HFA, VENTOLIN HFA, PROAIR HFA) 90 mcg/actuation inhaler; Take 1 Puff by inhalation every six (6) hours as needed for Wheezing. Dispense: 1 Inhaler; Refill: 5  - REFERRAL TO PULMONARY DISEASE    5. Obesity (BMI 35.0-39.9 without comorbidity)  D/w pt daily walking (at least 20 minutes), healthy diet w fruits and/or veggies w each meal, portion sizes and weight loss      There are no Patient Instructions on file for this visit. Follow-up and Dispositions    · Return in about 4 weeks (around 8/27/2021) for take bp meds DAILY. I have reviewed with the patient details of the assessment and plan and all questions were answered. Relevent patient education was performed. The most recent lab findings were reviewed with the patient. An After Visit Summary was printed and given to the patient.

## 2021-07-30 NOTE — PROGRESS NOTES
Chief Complaint   Patient presents with    Hypertension     1. Have you been to the ER, urgent care clinic since your last visit? Hospitalized since your last visit? No    2. Have you seen or consulted any other health care providers outside of the [address] [place] [address] since your last visit? Include any pap smears or colon screening.  No

## 2021-07-30 NOTE — LETTER
7/30/2021 1:59 PM    Mr. Aicha Mitchell  [address]  [place] [address]    Continue to limit patients work day to no more than 8 hours. Feel free to contact me with any questions or concerns.             Sincerely,      Nati Morfin MD

## 2021-08-17 DIAGNOSIS — U07.1 COVID-19: Primary | ICD-10-CM

## 2021-08-23 ENCOUNTER — TRANSCRIBE ORDER (OUTPATIENT)
Dept: REGISTRATION | Age: 61
End: 2021-08-23

## 2021-08-23 ENCOUNTER — HOSPITAL ENCOUNTER (OUTPATIENT)
Dept: GENERAL RADIOLOGY | Age: 61
Discharge: HOME | End: 2021-08-23
Payer: COMMERCIAL

## 2021-08-23 DIAGNOSIS — R06.02 SHORTNESS OF BREATH: Primary | ICD-10-CM

## 2021-08-23 DIAGNOSIS — R06.02 SHORTNESS OF BREATH: ICD-10-CM

## 2021-08-23 PROCEDURE — 71046 X-RAY EXAM CHEST 2 VIEWS: CPT

## 2021-11-12 ENCOUNTER — OFFICE VISIT (OUTPATIENT)
Dept: INTERNAL MEDICINE CLINIC | Age: 61
End: 2021-11-12
Payer: COMMERCIAL

## 2021-11-12 VITALS
WEIGHT: 254 LBS | BODY MASS INDEX: 34.4 KG/M2 | RESPIRATION RATE: 19 BRPM | HEIGHT: 72 IN | TEMPERATURE: 98.1 F | SYSTOLIC BLOOD PRESSURE: 117 MMHG | DIASTOLIC BLOOD PRESSURE: 72 MMHG | OXYGEN SATURATION: 96 % | HEART RATE: 90 BPM

## 2021-11-12 DIAGNOSIS — K21.00 GASTROESOPHAGEAL REFLUX DISEASE WITH ESOPHAGITIS WITHOUT HEMORRHAGE: ICD-10-CM

## 2021-11-12 DIAGNOSIS — Z23 NEEDS FLU SHOT: ICD-10-CM

## 2021-11-12 DIAGNOSIS — I10 ESSENTIAL HYPERTENSION: ICD-10-CM

## 2021-11-12 DIAGNOSIS — L03.311 CELLULITIS OF ABDOMINAL WALL: Primary | ICD-10-CM

## 2021-11-12 PROCEDURE — 90471 IMMUNIZATION ADMIN: CPT | Performed by: INTERNAL MEDICINE

## 2021-11-12 PROCEDURE — 90686 IIV4 VACC NO PRSV 0.5 ML IM: CPT | Performed by: INTERNAL MEDICINE

## 2021-11-12 PROCEDURE — 99214 OFFICE O/P EST MOD 30 MIN: CPT | Performed by: INTERNAL MEDICINE

## 2021-11-12 RX ORDER — SULFAMETHOXAZOLE AND TRIMETHOPRIM 800; 160 MG/1; MG/1
1 TABLET ORAL 2 TIMES DAILY
Qty: 20 TABLET | Refills: 0 | Status: SHIPPED | OUTPATIENT
Start: 2021-11-12 | End: 2022-01-05 | Stop reason: ALTCHOICE

## 2021-11-12 RX ORDER — OMEPRAZOLE 20 MG/1
20 CAPSULE, DELAYED RELEASE ORAL DAILY
Qty: 30 CAPSULE | Refills: 2 | Status: SHIPPED | OUTPATIENT
Start: 2021-11-12

## 2021-11-12 NOTE — PROGRESS NOTES
Chief Complaint   Patient presents with    Skin Problem     near [facility]      1. Have you been to the ER, urgent care clinic since your last visit? Hospitalized since your last visit? No    2. Have you seen or consulted any other health care providers outside of the [address] [place] [address] since your last visit? Include any pap smears or colon screening.  No

## 2021-11-12 NOTE — LETTER
NOTIFICATION RETURN TO WORK / SCHOOL    11/12/2021 11:39 AM    Mr. Diana Sharpe  [address] [place]  [place] [address]      To Whom It May Concern:    Diana Sharpe is currently under the care of Calli Mcneill. He will return to work/school on: 11/13/2021    If there are questions or concerns please have the patient contact our office.         Sincerely,      Dustin Zarate MD

## 2021-11-12 NOTE — PROGRESS NOTES
Otto Braun is a 63 y.o. male and presents with Skin Problem (near [facility] )  . Subjective:    Pt with c/o rash on abd area x 2 days- red, swollen, tender. Pt denies fever  PMH:   HTN-  BP Readings from Last 3 Encounters:   21 117/72   21 (!) 142/100   21 (!) 165/114      Covid infection and pneumonia 3/2021    PSH- appendectomy    SH-    No tob occas no heroin/cocaine    FH- father  ? ? Mother  80 natural causes   8 siblings 1 sister  aneurysm, 1 brother ESRD due to drug use    HM    Colonoscopy < 10 yrs ago  Immunizations  Eye care  Dental care        Review of Systems  Review of systems (12) negative, except noted above. Past Medical History:   Diagnosis Date    Gastrointestinal disorder     GERDS    GERD (gastroesophageal reflux disease)     Ill-defined condition     MRSA     Past Surgical History:   Procedure Laterality Date    HX APPENDECTOMY      HX HEENT      right eye surgery    HX OTHER SURGICAL      right eye surgery    HX UROLOGICAL      laser surgery on \"my private parts\"     Social History     Socioeconomic History    Marital status:    Tobacco Use    Smoking status: Never Smoker    Smokeless tobacco: Never Used   Vaping Use    Vaping Use: Never used   Substance and Sexual Activity    Alcohol use: Yes     Comment: wine coolers    Drug use: Yes     Types: Marijuana    Sexual activity: Yes     History reviewed. No pertinent family history. Current Outpatient Medications   Medication Sig Dispense Refill    omeprazole (PRILOSEC) 20 mg capsule Take 1 Capsule by mouth daily. 30 Capsule 2    trimethoprim-sulfamethoxazole (BACTRIM DS, SEPTRA DS) 160-800 mg per tablet Take 1 Tablet by mouth two (2) times a day. 20 Tablet 0    albuterol (PROVENTIL HFA, VENTOLIN HFA, PROAIR HFA) 90 mcg/actuation inhaler Take 1 Puff by inhalation every six (6) hours as needed for Wheezing.  1 Inhaler 5    amLODIPine-benazepril (LOTREL) 10-20 mg per capsule Take 1 Capsule by mouth daily. 60 Capsule 1    naproxen (NAPROSYN) 500 mg tablet Take 1 Tab by mouth two (2) times daily (with meals). 60 Tab 0     No Known Allergies    Objective:  Visit Vitals  /72 (BP 1 Location: Left upper arm, BP Patient Position: Sitting, BP Cuff Size: Adult)   Pulse 90   Temp 98.1 °F (36.7 °C) (Temporal)   Resp 19   Ht 6' (1.829 m)   Wt 254 lb (115.2 kg)   SpO2 96%   BMI 34.45 kg/m²     Physical Exam:   General appearance - alert, well appearing, and in no distress  Mental status - alert, oriented to person, place, and time  EYE-EOMI  Neck - supple, no significant adenopathy   Chest - clear to auscultation, no wheezes, rales or rhonchi, symmetric air entry   Heart - normal rate, regular rhythm, normal S1, S2  Abdomen - soft, nontender, obese +bs  Ext-peripheral pulses normal, no pedal edema, no clubbing or cyanosis  Skin-Warm and dry. + indurated, red, papules suprapubic area of abdomen  Neuro -alert, oriented, normal speech, no focal findings or movement disorder noted      Results for orders placed or performed during the hospital encounter of 03/17/21   COVID-19 RAPID TEST   Result Value Ref Range    Specimen source Nasopharyngeal      COVID-19 rapid test Not detected NOTD     SARS-COV-2   Result Value Ref Range    SARS-CoV-2 Please find results under separate order     CBC WITH AUTOMATED DIFF   Result Value Ref Range    WBC 3.9 (L) 4.1 - 11.1 K/uL    RBC 4.42 4.10 - 5.70 M/uL    HGB 13.3 12.1 - 17.0 g/dL    HCT 37.8 36.6 - 50.3 %    MCV 85.5 80.0 - 99.0 FL    MCH 30.1 26.0 - 34.0 PG    MCHC 35.2 30.0 - 36.5 g/dL    RDW 13.4 11.5 - 14.5 %    PLATELET 063 168 - 603 K/uL    MPV 10.8 8.9 - 12.9 FL    NRBC 0.0 0  WBC    ABSOLUTE NRBC 0.00 0.00 - 0.01 K/uL    NEUTROPHILS 78 (H) 32 - 75 %    LYMPHOCYTES 16 12 - 49 %    MONOCYTES 5 5 - 13 %    EOSINOPHILS 0 0 - 7 %    BASOPHILS 0 0 - 1 %    IMMATURE GRANULOCYTES 1 (H) 0.0 - 0.5 %    ABS.  NEUTROPHILS 3.1 1.8 - 8.0 K/UL ABS. LYMPHOCYTES 0.6 (L) 0.8 - 3.5 K/UL    ABS. MONOCYTES 0.2 0.0 - 1.0 K/UL    ABS. EOSINOPHILS 0.0 0.0 - 0.4 K/UL    ABS. BASOPHILS 0.0 0.0 - 0.1 K/UL    ABS. IMM. GRANS. 0.0 0.00 - 0.04 K/UL    DF SMEAR SCANNED      RBC COMMENTS OVALOCYTES  PRESENT        RBC COMMENTS POLYCHROMASIA  PRESENT        RBC COMMENTS ANISOCYTOSIS  1+       METABOLIC PANEL, COMPREHENSIVE   Result Value Ref Range    Sodium 132 (L) 136 - 145 mmol/L    Potassium 4.1 3.5 - 5.1 mmol/L    Chloride 104 97 - 108 mmol/L    CO2 21 21 - 32 mmol/L    Anion gap 7 5 - 15 mmol/L    Glucose 184 (H) 65 - 100 mg/dL    BUN 15 6 - 20 MG/DL    Creatinine 1.02 0.70 - 1.30 MG/DL    BUN/Creatinine ratio 15 12 - 20      GFR est AA >60 >60 ml/min/1.73m2    GFR est non-AA >60 >60 ml/min/1.73m2    Calcium 8.8 8.5 - 10.1 MG/DL    Bilirubin, total 0.8 0.2 - 1.0 MG/DL    ALT (SGPT) 58 12 - 78 U/L    AST (SGOT) 72 (H) 15 - 37 U/L    Alk.  phosphatase 43 (L) 45 - 117 U/L    Protein, total 7.9 6.4 - 8.2 g/dL    Albumin 3.0 (L) 3.5 - 5.0 g/dL    Globulin 4.9 (H) 2.0 - 4.0 g/dL    A-G Ratio 0.6 (L) 1.1 - 2.2     PROCALCITONIN   Result Value Ref Range    Procalcitonin <0.05 ng/mL   FERRITIN   Result Value Ref Range    Ferritin 474 (H) 26 - 388 NG/ML   D DIMER   Result Value Ref Range    D-dimer 0.56 0.00 - 0.65 mg/L FEU   SARS-COV-2   Result Value Ref Range    SARS-CoV-2 Please find results under separate order     SARS-COV-2   Result Value Ref Range    Specimen source Nasopharyngeal      SARS-CoV-2 Detected (AA) NOTD     CBC WITH AUTOMATED DIFF   Result Value Ref Range    WBC 11.7 (H) 4.1 - 11.1 K/uL    RBC 4.56 4.10 - 5.70 M/uL    HGB 14.0 12.1 - 17.0 g/dL    HCT 39.7 36.6 - 50.3 %    MCV 87.1 80.0 - 99.0 FL    MCH 30.7 26.0 - 34.0 PG    MCHC 35.3 30.0 - 36.5 g/dL    RDW 13.3 11.5 - 14.5 %    PLATELET 891 439 - 244 K/uL    MPV 11.1 8.9 - 12.9 FL    NRBC 0.0 0  WBC    ABSOLUTE NRBC 0.00 0.00 - 0.01 K/uL    NEUTROPHILS 79 (H) 32 - 75 %    LYMPHOCYTES 9 (L) 12 - 49 % MONOCYTES 11 5 - 13 %    EOSINOPHILS 0 0 - 7 %    BASOPHILS 0 0 - 1 %    IMMATURE GRANULOCYTES 1 (H) 0.0 - 0.5 %    ABS. NEUTROPHILS 9.3 (H) 1.8 - 8.0 K/UL    ABS. LYMPHOCYTES 1.1 0.8 - 3.5 K/UL    ABS. MONOCYTES 1.3 (H) 0.0 - 1.0 K/UL    ABS. EOSINOPHILS 0.0 0.0 - 0.4 K/UL    ABS. BASOPHILS 0.0 0.0 - 0.1 K/UL    ABS. IMM. GRANS. 0.1 (H) 0.00 - 0.04 K/UL    DF AUTOMATED     METABOLIC PANEL, COMPREHENSIVE   Result Value Ref Range    Sodium 135 (L) 136 - 145 mmol/L    Potassium 4.5 3.5 - 5.1 mmol/L    Chloride 104 97 - 108 mmol/L    CO2 25 21 - 32 mmol/L    Anion gap 6 5 - 15 mmol/L    Glucose 130 (H) 65 - 100 mg/dL    BUN 16 6 - 20 MG/DL    Creatinine 1.06 0.70 - 1.30 MG/DL    BUN/Creatinine ratio 15 12 - 20      GFR est AA >60 >60 ml/min/1.73m2    GFR est non-AA >60 >60 ml/min/1.73m2    Calcium 8.9 8.5 - 10.1 MG/DL    Bilirubin, total 0.6 0.2 - 1.0 MG/DL    ALT (SGPT) 53 12 - 78 U/L    AST (SGOT) 56 (H) 15 - 37 U/L    Alk. phosphatase 45 45 - 117 U/L    Protein, total 8.3 (H) 6.4 - 8.2 g/dL    Albumin 3.2 (L) 3.5 - 5.0 g/dL    Globulin 5.1 (H) 2.0 - 4.0 g/dL    A-G Ratio 0.6 (L) 1.1 - 2.2     METABOLIC PANEL, COMPREHENSIVE   Result Value Ref Range    Sodium 136 136 - 145 mmol/L    Potassium 4.1 3.5 - 5.1 mmol/L    Chloride 107 97 - 108 mmol/L    CO2 23 21 - 32 mmol/L    Anion gap 6 5 - 15 mmol/L    Glucose 151 (H) 65 - 100 mg/dL    BUN 14 6 - 20 MG/DL    Creatinine 1.04 0.70 - 1.30 MG/DL    BUN/Creatinine ratio 13 12 - 20      GFR est AA >60 >60 ml/min/1.73m2    GFR est non-AA >60 >60 ml/min/1.73m2    Calcium 8.3 (L) 8.5 - 10.1 MG/DL    Bilirubin, total 0.6 0.2 - 1.0 MG/DL    ALT (SGPT) 49 12 - 78 U/L    AST (SGOT) 44 (H) 15 - 37 U/L    Alk.  phosphatase 49 45 - 117 U/L    Protein, total 7.7 6.4 - 8.2 g/dL    Albumin 2.9 (L) 3.5 - 5.0 g/dL    Globulin 4.8 (H) 2.0 - 4.0 g/dL    A-G Ratio 0.6 (L) 1.1 - 2.2     CBC W/O DIFF   Result Value Ref Range    WBC 14.5 (H) 4.1 - 11.1 K/uL    RBC 4.37 4.10 - 5.70 M/uL    HGB 13.4 12.1 - 17.0 g/dL    HCT 37.3 36.6 - 50.3 %    MCV 85.4 80.0 - 99.0 FL    MCH 30.7 26.0 - 34.0 PG    MCHC 35.9 30.0 - 36.5 g/dL    RDW 13.5 11.5 - 14.5 %    PLATELET 216 456 - 950 K/uL    MPV 10.1 8.9 - 12.9 FL    NRBC 0.0 0  WBC    ABSOLUTE NRBC 0.00 0.00 - 0.01 K/uL   CBC WITH AUTOMATED DIFF   Result Value Ref Range    WBC 14.2 (H) 4.1 - 11.1 K/uL    RBC 4.45 4.10 - 5.70 M/uL    HGB 13.6 12.1 - 17.0 g/dL    HCT 38.3 36.6 - 50.3 %    MCV 86.1 80.0 - 99.0 FL    MCH 30.6 26.0 - 34.0 PG    MCHC 35.5 30.0 - 36.5 g/dL    RDW 13.5 11.5 - 14.5 %    PLATELET 865 009 - 180 K/uL    MPV 10.3 8.9 - 12.9 FL    NRBC 0.0 0  WBC    ABSOLUTE NRBC 0.00 0.00 - 0.01 K/uL    NEUTROPHILS 74 32 - 75 %    BAND NEUTROPHILS 5 %    LYMPHOCYTES 15 12 - 49 %    MONOCYTES 6 5 - 13 %    EOSINOPHILS 0 0 - 7 %    BASOPHILS 0 0 - 1 %    IMMATURE GRANULOCYTES 0 0.0 - 0.5 %    ABS. NEUTROPHILS 11.2 (H) 1.8 - 8.0 K/UL    ABS. LYMPHOCYTES 2.1 0.8 - 3.5 K/UL    ABS. MONOCYTES 0.9 0.0 - 1.0 K/UL    ABS. EOSINOPHILS 0.0 0.0 - 0.4 K/UL    ABS. BASOPHILS 0.0 0.0 - 0.1 K/UL    ABS. IMM. GRANS. 0.0 0.00 - 0.04 K/UL    DF MANUAL      RBC COMMENTS OVALOCYTES  PRESENT        WBC COMMENTS REACTIVE LYMPHS     METABOLIC PANEL, COMPREHENSIVE   Result Value Ref Range    Sodium 136 136 - 145 mmol/L    Potassium 4.6 3.5 - 5.1 mmol/L    Chloride 107 97 - 108 mmol/L    CO2 24 21 - 32 mmol/L    Anion gap 5 5 - 15 mmol/L    Glucose 116 (H) 65 - 100 mg/dL    BUN 14 6 - 20 MG/DL    Creatinine 0.97 0.70 - 1.30 MG/DL    BUN/Creatinine ratio 14 12 - 20      GFR est AA >60 >60 ml/min/1.73m2    GFR est non-AA >60 >60 ml/min/1.73m2    Calcium 8.5 8.5 - 10.1 MG/DL    Bilirubin, total 0.7 0.2 - 1.0 MG/DL    ALT (SGPT) 47 12 - 78 U/L    AST (SGOT) 34 15 - 37 U/L    Alk.  phosphatase 51 45 - 117 U/L    Protein, total 7.5 6.4 - 8.2 g/dL    Albumin 2.8 (L) 3.5 - 5.0 g/dL    Globulin 4.7 (H) 2.0 - 4.0 g/dL    A-G Ratio 0.6 (L) 1.1 - 2.2     METABOLIC PANEL, COMPREHENSIVE Result Value Ref Range    Sodium 134 (L) 136 - 145 mmol/L    Potassium 4.1 3.5 - 5.1 mmol/L    Chloride 105 97 - 108 mmol/L    CO2 21 21 - 32 mmol/L    Anion gap 8 5 - 15 mmol/L    Glucose 187 (H) 65 - 100 mg/dL    BUN 16 6 - 20 MG/DL    Creatinine 1.02 0.70 - 1.30 MG/DL    BUN/Creatinine ratio 16 12 - 20      GFR est AA >60 >60 ml/min/1.73m2    GFR est non-AA >60 >60 ml/min/1.73m2    Calcium 8.8 8.5 - 10.1 MG/DL    Bilirubin, total 0.6 0.2 - 1.0 MG/DL    ALT (SGPT) 47 12 - 78 U/L    AST (SGOT) 27 15 - 37 U/L    Alk. phosphatase 51 45 - 117 U/L    Protein, total 7.4 6.4 - 8.2 g/dL    Albumin 2.8 (L) 3.5 - 5.0 g/dL    Globulin 4.6 (H) 2.0 - 4.0 g/dL    A-G Ratio 0.6 (L) 1.1 - 2.2         Assessment/Plan:    ICD-10-CM ICD-9-CM    1. Cellulitis of abdominal wall  L03.311 682.2 trimethoprim-sulfamethoxazole (BACTRIM DS, SEPTRA DS) 160-800 mg per tablet   2. Essential hypertension  I10 401.9    3. Gastroesophageal reflux disease with esophagitis without hemorrhage  K21.00 530.81 omeprazole (PRILOSEC) 20 mg capsule     530.10      Orders Placed This Encounter    omeprazole (PRILOSEC) 20 mg capsule     Sig: Take 1 Capsule by mouth daily. Dispense:  30 Capsule     Refill:  2    trimethoprim-sulfamethoxazole (BACTRIM DS, SEPTRA DS) 160-800 mg per tablet     Sig: Take 1 Tablet by mouth two (2) times a day. Dispense:  20 Tablet     Refill:  0     1. Cellulitis of abdominal wall  Bactrim  Recommend warm compresses    2. Essential hypertension  MUCH mproved    3. Gastroesophageal reflux disease with esophagitis without hemorrhage    - omeprazole (PRILOSEC) 20 mg capsule; Take 1 Capsule by mouth daily. Dispense: 30 Capsule; Refill: 2      There are no Patient Instructions on file for this visit. I have reviewed with the patient details of the assessment and plan and all questions were answered. Relevent patient education was performed. The most recent lab findings were reviewed with the patient.     An After Visit Summary was printed and given to the patient.

## 2021-11-27 ENCOUNTER — APPOINTMENT (OUTPATIENT)
Dept: GENERAL RADIOLOGY | Age: 61
End: 2021-11-27
Attending: NURSE PRACTITIONER
Payer: COMMERCIAL

## 2021-11-27 ENCOUNTER — HOSPITAL ENCOUNTER (EMERGENCY)
Age: 61
Discharge: HOME | End: 2021-11-27
Attending: EMERGENCY MEDICINE
Payer: COMMERCIAL

## 2021-11-27 VITALS
SYSTOLIC BLOOD PRESSURE: 160 MMHG | TEMPERATURE: 98 F | HEIGHT: 72 IN | DIASTOLIC BLOOD PRESSURE: 105 MMHG | OXYGEN SATURATION: 98 % | RESPIRATION RATE: 18 BRPM | BODY MASS INDEX: 34.4 KG/M2 | HEART RATE: 93 BPM | WEIGHT: 254 LBS

## 2021-11-27 DIAGNOSIS — L03.031 CELLULITIS OF TOE OF RIGHT FOOT: Primary | ICD-10-CM

## 2021-11-27 DIAGNOSIS — B35.1 ONYCHOMYCOSIS: ICD-10-CM

## 2021-11-27 PROCEDURE — 99283 EMERGENCY DEPT VISIT LOW MDM: CPT

## 2021-11-27 PROCEDURE — 73630 X-RAY EXAM OF FOOT: CPT

## 2021-11-27 PROCEDURE — 74011250637 HC RX REV CODE- 250/637: Performed by: NURSE PRACTITIONER

## 2021-11-27 RX ORDER — DOXYCYCLINE HYCLATE 100 MG
100 TABLET ORAL 2 TIMES DAILY
Qty: 20 TABLET | Refills: 0 | Status: SHIPPED | OUTPATIENT
Start: 2021-11-27 | End: 2021-12-07

## 2021-11-27 RX ORDER — IBUPROFEN 400 MG/1
800 TABLET ORAL
Status: COMPLETED | OUTPATIENT
Start: 2021-11-27 | End: 2021-11-27

## 2021-11-27 RX ORDER — DICLOFENAC SODIUM 75 MG/1
75 TABLET, DELAYED RELEASE ORAL 2 TIMES DAILY
Qty: 30 TABLET | Refills: 0 | Status: SHIPPED | OUTPATIENT
Start: 2021-11-27 | End: 2022-04-13

## 2021-11-27 RX ADMIN — IBUPROFEN 800 MG: 400 TABLET ORAL at 14:02

## 2021-11-27 NOTE — ED PROVIDER NOTES
EMERGENCY DEPARTMENT HISTORY AND PHYSICAL EXAM    Date: 11/27/2021  Patient Name: Kylie Rosario    History of Presenting Illness     Chief Complaint   Patient presents with    Toe Pain         History Provided By: Patient    Chief Complaint: toe pain  Duration: 2 Weeks  Timing:  Acute  Location: right third toe  Quality: pinching  Severity: 9 out of 10  Modifying Factors: wearing shoes walking worsens pain  Associated Symptoms: dry skin on toe      HPI: Kylie Rosario is a 63 y.o. male with a PMH of GERD who presents with  Right third toe pain acute onset 2 weeks ago. 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 He denies injury. He reports dry skin at the distal end of his right third toe. PCP: Barrett Roman MD    Current Outpatient Medications   Medication Sig Dispense Refill    doxycycline (VIBRA-TABS) 100 mg tablet Take 1 Tablet by mouth two (2) times a day for 10 days. 20 Tablet 0    diclofenac EC (VOLTAREN) 75 mg EC tablet Take 1 Tablet by mouth two (2) times a day. 30 Tablet 0    omeprazole (PRILOSEC) 20 mg capsule Take 1 Capsule by mouth daily. 30 Capsule 2    trimethoprim-sulfamethoxazole (BACTRIM DS, SEPTRA DS) 160-800 mg per tablet Take 1 Tablet by mouth two (2) times a day. 20 Tablet 0    albuterol (PROVENTIL HFA, VENTOLIN HFA, PROAIR HFA) 90 mcg/actuation inhaler Take 1 Puff by inhalation every six (6) hours as needed for Wheezing. 1 Inhaler 5    amLODIPine-benazepril (LOTREL) 10-20 mg per capsule Take 1 Capsule by mouth daily.  60 Capsule 1       Past History     Past Medical History:  Past Medical History:   Diagnosis Date    Gastrointestinal disorder     GERDS    GERD (gastroesophageal reflux disease)     Ill-defined condition     MRSA       Past Surgical History:  Past Surgical History:   Procedure Laterality Date    HX APPENDECTOMY      HX HEENT      right eye surgery    HX OTHER SURGICAL      right eye surgery    HX UROLOGICAL      laser surgery on \"my private parts\"       Family History:  History reviewed. No pertinent family history. Social History:  Social History     Tobacco Use    Smoking status: Never Smoker    Smokeless tobacco: Never Used   Vaping Use    Vaping Use: Never used   Substance Use Topics    Alcohol use: Yes     Comment: wine coolers    Drug use: Yes     Types: Marijuana       Allergies:  No Known Allergies      Review of Systems   Review of Systems   Constitutional: Negative for chills, fatigue and fever. HENT: Negative for congestion and sore throat. Eyes: Negative for redness. Respiratory: Negative for cough, chest tightness and wheezing. Cardiovascular: Negative for chest pain. Gastrointestinal: Negative for abdominal pain. Genitourinary: Negative for dysuria. Musculoskeletal: Negative for arthralgias, back pain, myalgias, neck pain and neck stiffness. Toe pain   Skin: Negative for rash. Neurological: Negative for dizziness, syncope, weakness, light-headedness, numbness and headaches. All other systems reviewed and are negative. Physical Exam     Vitals:    11/27/21 1324   BP: (!) 160/105   Pulse: 93   Resp: 18   Temp: 98 °F (36.7 °C)   SpO2: 98%   Weight: 115.2 kg (254 lb)   Height: 6' (1.829 m)     Physical Exam  Vitals and nursing note reviewed. Constitutional:       Appearance: He is well-developed. HENT:      Head: Normocephalic and atraumatic. Right Ear: External ear normal.      Left Ear: External ear normal.      Nose: Nose normal.      Mouth/Throat:      Mouth: Mucous membranes are moist.   Eyes:      General:         Right eye: No discharge. Left eye: No discharge. Conjunctiva/sclera: Conjunctivae normal.   Cardiovascular:      Rate and Rhythm: Normal rate and regular rhythm. Heart sounds: Normal heart sounds. Pulmonary:      Effort: Pulmonary effort is normal. No respiratory distress. Breath sounds: Normal breath sounds. No wheezing.    Abdominal: General: Bowel sounds are normal.      Palpations: Abdomen is soft. Tenderness: There is no abdominal tenderness. Musculoskeletal:         General: Normal range of motion. Cervical back: Normal range of motion and neck supple. Lymphadenopathy:      Cervical: No cervical adenopathy. Skin:     General: Skin is warm and dry. Comments: Distal end of right third toe noted thick callus and fungal toenail base of toenail is erythematous and warm and tender to touch. DNV is intact   Neurological:      Mental Status: He is alert and oriented to person, place, and time. Cranial Nerves: No cranial nerve deficit. Psychiatric:         Behavior: Behavior normal.         Thought Content: Thought content normal.         Judgment: Judgment normal.           Diagnostic Study Results     Labs -   No results found for this or any previous visit (from the past 12 hour(s)). Radiologic Studies -   XR FOOT RT MIN 3 V   Final Result   No acute abnormality. CT Results  (Last 48 hours)    None        CXR Results  (Last 48 hours)    None            Medical Decision Making   I am the first provider for this patient. I reviewed the vital signs, available nursing notes, past medical history, past surgical history, family history and social history. Vital Signs-Reviewed the patient's vital signs. Records Reviewed: Nursing Notes    Provider Notes (Medical Decision Making):   DDX callous cellulitis fracture tinea pedis          Disposition:  home    DISCHARGE NOTE:           Care plan outlined and precautions discussed. Patient has no new complaints, changes, or physical findings. Results of xray were reviewed with the patient. All medications were reviewed with the patient; will d/c home with doxycycline m. All of pt's questions and concerns were addressed. Patient was instructed and agrees to follow up with podiatry, as well as to return to the ED upon further deterioration.  Patient is ready to go home.    Follow-up Information     Follow up With Specialties Details Why Contact Info    Jacquelin Cruz MD Internal Medicine In 3 days  [address]  [address]      Elida Allen, [facility]   [address]  [address] [phone]            Discharge Medication List as of 11/27/2021  2:10 PM      START taking these medications    Details   doxycycline (VIBRA-TABS) 100 mg tablet Take 1 Tablet by mouth two (2) times a day for 10 days. , Normal, Disp-20 Tablet, R-0      diclofenac EC (VOLTAREN) 75 mg EC tablet Take 1 Tablet by mouth two (2) times a day., Normal, Disp-30 Tablet, R-0         CONTINUE these medications which have NOT CHANGED    Details   omeprazole (PRILOSEC) 20 mg capsule Take 1 Capsule by mouth daily. , Normal, Disp-30 Capsule, R-2      trimethoprim-sulfamethoxazole (BACTRIM DS, SEPTRA DS) 160-800 mg per tablet Take 1 Tablet by mouth two (2) times a day., Normal, Disp-20 Tablet, R-0      albuterol (PROVENTIL HFA, VENTOLIN HFA, PROAIR HFA) 90 mcg/actuation inhaler Take 1 Puff by inhalation every six (6) hours as needed for Wheezing., Normal, Disp-1 Inhaler, R-5      amLODIPine-benazepril (LOTREL) 10-20 mg per capsule Take 1 Capsule by mouth daily. , Normal, Disp-60 Capsule, R-1         STOP taking these medications       naproxen (NAPROSYN) 500 mg tablet Comments:   Reason for Stopping:               Procedures:  Procedures    Please note that this dictation was completed with Dragon, computer voice recognition software. Quite often unanticipated grammatical, syntax, homophones, and other interpretive errors are inadvertently transcribed by the computer software. Please disregard these errors. Additionally, please excuse any errors that have escaped final proofreading. Diagnosis     Clinical Impression:   1. Cellulitis of toe of right foot    2.  Onychomycosis

## 2021-11-27 NOTE — LETTER
[facility] EMERGENCY DEPT  [address] [id number] [phone]    Work/School Note    Date: 11/27/2021    To Whom It May concern:    Marian Lowry was seen and treated today in the emergency room by the following provider(s):  Attending Provider: Mike Sofia MD  Nurse Practitioner: Gemma Neal NP. Marian Lowry may return to work on 11-.     Sincerely,          Lisa Castro NP

## 2022-01-03 DIAGNOSIS — I10 ESSENTIAL HYPERTENSION: ICD-10-CM

## 2022-01-03 RX ORDER — AMLODIPINE AND BENAZEPRIL HYDROCHLORIDE 10; 20 MG/1; MG/1
1 CAPSULE ORAL DAILY
Qty: 90 CAPSULE | Refills: 0 | Status: SHIPPED | OUTPATIENT
Start: 2022-01-03 | End: 2022-04-11

## 2022-01-05 ENCOUNTER — HOSPITAL ENCOUNTER (OUTPATIENT)
Dept: GENERAL RADIOLOGY | Age: 62
Discharge: HOME | End: 2022-01-05
Payer: COMMERCIAL

## 2022-01-05 ENCOUNTER — OFFICE VISIT (OUTPATIENT)
Dept: INTERNAL MEDICINE CLINIC | Age: 62
End: 2022-01-05
Payer: COMMERCIAL

## 2022-01-05 VITALS
HEART RATE: 86 BPM | DIASTOLIC BLOOD PRESSURE: 87 MMHG | WEIGHT: 261 LBS | SYSTOLIC BLOOD PRESSURE: 138 MMHG | HEIGHT: 72 IN | TEMPERATURE: 98.4 F | BODY MASS INDEX: 35.35 KG/M2 | RESPIRATION RATE: 16 BRPM | OXYGEN SATURATION: 97 %

## 2022-01-05 DIAGNOSIS — L81.0 POST-INFLAMMATORY HYPERPIGMENTATION: ICD-10-CM

## 2022-01-05 DIAGNOSIS — R35.0 BENIGN PROSTATIC HYPERPLASIA WITH URINARY FREQUENCY: ICD-10-CM
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
## 2022-01-05 DIAGNOSIS — N40.1 BENIGN PROSTATIC HYPERPLASIA WITH URINARY FREQUENCY: ICD-10-CM

## 2022-01-05 DIAGNOSIS — M54.6 ACUTE LEFT-SIDED THORACIC BACK PAIN: ICD-10-CM

## 2022-01-05 DIAGNOSIS — R21 SKIN RASH: ICD-10-CM
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
## 2022-01-05 PROCEDURE — 99214 OFFICE O/P EST MOD 30 MIN: CPT | Performed by: INTERNAL MEDICINE

## 2022-01-05 PROCEDURE — 71101 X-RAY EXAM UNILAT RIBS/CHEST: CPT

## 2022-01-05 RX ORDER — MUPIROCIN CALCIUM 20 MG/G
CREAM TOPICAL 2 TIMES DAILY
Qty: 15 G | Refills: 0 | Status: SHIPPED | OUTPATIENT
Start: 2022-01-05 | End: 2022-04-06

## 2022-01-05 RX ORDER — TRIAMCINOLONE ACETONIDE 1 MG/G
OINTMENT TOPICAL 2 TIMES DAILY
Qty: 30 G | Refills: 0 | Status: SHIPPED | OUTPATIENT
Start: 2022-01-05 | End: 2022-04-06

## 2022-01-05 RX ORDER — IBUPROFEN 800 MG/1
800 TABLET ORAL
Qty: 30 TABLET | Refills: 1 | Status: SHIPPED | OUTPATIENT
Start: 2022-01-05 | End: 2022-04-13

## 2022-01-05 RX ORDER — CYCLOBENZAPRINE HCL 10 MG
10 TABLET ORAL
Qty: 21 TABLET | Refills: 0 | Status: SHIPPED | OUTPATIENT
Start: 2022-01-05 | End: 2022-04-06

## 2022-01-05 NOTE — PROGRESS NOTES
Demario Blanchard is a 63 y.o. male and presents with Fall (22- left side pain ), Urinary Frequency, and Skin Problem  . Subjective:    Pt with multiple concerns. 1)Pt w c/o left back pain after slip on ice yesterday. Temporary relief w aleve    2)\"Issues with my bladder\"-urinary frequency x 6 weeks. No nocturia/hematuria/stream issues  No results found for this or any previous visit. 3)Discharge from [facility] x few days after vigorously cleaning    4)Dark spot of left lateral lower leg area chronically. A little itchy. No known trauma          PMH:   HTN-on lotrel 10/20  BP Readings from Last 3 Encounters:   22 138/87   21 (!) 160/105   21 117/72      Covid infection and pneumonia 3/2021    PSH- appendectomy    SH-    No tob occas no heroin/cocaine    FH- father  ? ? Mother  80 natural causes   8 siblings 1 sister  aneurysm, 1 brother ESRD due to drug use    HM    Colonoscopy < 10 yrs ago  Immunizations  Eye care  Dental care        Review of Systems  Review of systems (12) negative, except noted above. Past Medical History:   Diagnosis Date    Gastrointestinal disorder     GERDS    GERD (gastroesophageal reflux disease)     Ill-defined condition     MRSA     Past Surgical History:   Procedure Laterality Date    HX APPENDECTOMY      HX HEENT      right eye surgery    HX OTHER SURGICAL      right eye surgery    HX UROLOGICAL      laser surgery on \"my private parts\"     Social History     Socioeconomic History    Marital status:    Tobacco Use    Smoking status: Never Smoker    Smokeless tobacco: Never Used   Vaping Use    Vaping Use: Never used   Substance and Sexual Activity    Alcohol use: Yes     Comment: wine coolers    Drug use: Yes     Types: Marijuana    Sexual activity: Yes     History reviewed. No pertinent family history.   Current Outpatient Medications   Medication Sig Dispense Refill    triamcinolone acetonide (KENALOG) 0.1 % ointment Apply  to affected area two (2) times a day. use thin layer to leg area prn 30 g 0    mupirocin calcium (BACTROBAN) 2 % topical cream Apply  to affected area two (2) times a day. 15 g 0    ibuprofen (MOTRIN) 800 mg tablet Take 1 Tablet by mouth every eight (8) hours as needed for Pain. Take w full meal 30 Tablet 1    cyclobenzaprine (FLEXERIL) 10 mg tablet Take 1 Tablet by mouth three (3) times daily as needed for Muscle Spasm(s). 21 Tablet 0    amLODIPine-benazepril (LOTREL) 10-20 mg per capsule TAKE 1 CAPSULE BY MOUTH DAILY 90 Capsule 0    diclofenac EC (VOLTAREN) 75 mg EC tablet Take 1 Tablet by mouth two (2) times a day. 30 Tablet 0    omeprazole (PRILOSEC) 20 mg capsule Take 1 Capsule by mouth daily. 30 Capsule 2    albuterol (PROVENTIL HFA, VENTOLIN HFA, PROAIR HFA) 90 mcg/actuation inhaler Take 1 Puff by inhalation every six (6) hours as needed for Wheezing.  1 Inhaler 5     No Known Allergies    Objective:  Visit Vitals  /87 (BP 1 Location: Left upper arm, BP Patient Position: Sitting, BP Cuff Size: Adult)   Pulse 86   Temp 98.4 °F (36.9 °C) (Temporal)   Resp 16   Ht 6' (1.829 m)   Wt 261 lb (118.4 kg)   SpO2 97%   BMI 35.40 kg/m²     Physical Exam:   General appearance - alert, well appearing, and in no distress  Mental status - alert, oriented to person, place, and time  EYE-EOMI  Neck - supple, no significant adenopathy   Chest - clear to auscultation, n+ tenderness left flank area over lower rib  Heart - normal rate, regular rhythm, normal S1, S2  Abdomen - soft, nontender, obese +bs + umbil hernia w cracking of skin in superio-lateral fild  Ext-peripheral pulses normal, no pedal edema, no clubbing or cyanosis  Skin-Warm and dry. + hyperpig round raised, dry rash ~ 2 cm diameter  Neuro -alert, oriented, normal speech, no focal findings or movement disorder noted      Results for orders placed or performed during the hospital encounter of 03/17/21   COVID-19 RAPID TEST   Result Value Ref Range    Specimen source Nasopharyngeal      COVID-19 rapid test Not detected NOTD     SARS-COV-2   Result Value Ref Range    SARS-CoV-2 Please find results under separate order     CBC WITH AUTOMATED DIFF   Result Value Ref Range    WBC 3.9 (L) 4.1 - 11.1 K/uL    RBC 4.42 4.10 - 5.70 M/uL    HGB 13.3 12.1 - 17.0 g/dL    HCT 37.8 36.6 - 50.3 %    MCV 85.5 80.0 - 99.0 FL    MCH 30.1 26.0 - 34.0 PG    MCHC 35.2 30.0 - 36.5 g/dL    RDW 13.4 11.5 - 14.5 %    PLATELET 633 328 - 747 K/uL    MPV 10.8 8.9 - 12.9 FL    NRBC 0.0 0  WBC    ABSOLUTE NRBC 0.00 0.00 - 0.01 K/uL    NEUTROPHILS 78 (H) 32 - 75 %    LYMPHOCYTES 16 12 - 49 %    MONOCYTES 5 5 - 13 %    EOSINOPHILS 0 0 - 7 %    BASOPHILS 0 0 - 1 %    IMMATURE GRANULOCYTES 1 (H) 0.0 - 0.5 %    ABS. NEUTROPHILS 3.1 1.8 - 8.0 K/UL    ABS. LYMPHOCYTES 0.6 (L) 0.8 - 3.5 K/UL    ABS. MONOCYTES 0.2 0.0 - 1.0 K/UL    ABS. EOSINOPHILS 0.0 0.0 - 0.4 K/UL    ABS. BASOPHILS 0.0 0.0 - 0.1 K/UL    ABS. IMM. GRANS. 0.0 0.00 - 0.04 K/UL    DF SMEAR SCANNED      RBC COMMENTS OVALOCYTES  PRESENT        RBC COMMENTS POLYCHROMASIA  PRESENT        RBC COMMENTS ANISOCYTOSIS  1+       METABOLIC PANEL, COMPREHENSIVE   Result Value Ref Range    Sodium 132 (L) 136 - 145 mmol/L    Potassium 4.1 3.5 - 5.1 mmol/L    Chloride 104 97 - 108 mmol/L    CO2 21 21 - 32 mmol/L    Anion gap 7 5 - 15 mmol/L    Glucose 184 (H) 65 - 100 mg/dL    BUN 15 6 - 20 MG/DL    Creatinine 1.02 0.70 - 1.30 MG/DL    BUN/Creatinine ratio 15 12 - 20      GFR est AA >60 >60 ml/min/1.73m2    GFR est non-AA >60 >60 ml/min/1.73m2    Calcium 8.8 8.5 - 10.1 MG/DL    Bilirubin, total 0.8 0.2 - 1.0 MG/DL    ALT (SGPT) 58 12 - 78 U/L    AST (SGOT) 72 (H) 15 - 37 U/L    Alk.  phosphatase 43 (L) 45 - 117 U/L    Protein, total 7.9 6.4 - 8.2 g/dL    Albumin 3.0 (L) 3.5 - 5.0 g/dL    Globulin 4.9 (H) 2.0 - 4.0 g/dL    A-G Ratio 0.6 (L) 1.1 - 2.2     PROCALCITONIN   Result Value Ref Range    Procalcitonin <0.05 ng/mL   FERRITIN Result Value Ref Range    Ferritin 474 (H) 26 - 388 NG/ML   D DIMER   Result Value Ref Range    D-dimer 0.56 0.00 - 0.65 mg/L FEU   SARS-COV-2   Result Value Ref Range    SARS-CoV-2 Please find results under separate order     SARS-COV-2   Result Value Ref Range    Specimen source Nasopharyngeal      SARS-CoV-2 Detected (AA) NOTD     CBC WITH AUTOMATED DIFF   Result Value Ref Range    WBC 11.7 (H) 4.1 - 11.1 K/uL    RBC 4.56 4.10 - 5.70 M/uL    HGB 14.0 12.1 - 17.0 g/dL    HCT 39.7 36.6 - 50.3 %    MCV 87.1 80.0 - 99.0 FL    MCH 30.7 26.0 - 34.0 PG    MCHC 35.3 30.0 - 36.5 g/dL    RDW 13.3 11.5 - 14.5 %    PLATELET 691 666 - 118 K/uL    MPV 11.1 8.9 - 12.9 FL    NRBC 0.0 0  WBC    ABSOLUTE NRBC 0.00 0.00 - 0.01 K/uL    NEUTROPHILS 79 (H) 32 - 75 %    LYMPHOCYTES 9 (L) 12 - 49 %    MONOCYTES 11 5 - 13 %    EOSINOPHILS 0 0 - 7 %    BASOPHILS 0 0 - 1 %    IMMATURE GRANULOCYTES 1 (H) 0.0 - 0.5 %    ABS. NEUTROPHILS 9.3 (H) 1.8 - 8.0 K/UL    ABS. LYMPHOCYTES 1.1 0.8 - 3.5 K/UL    ABS. MONOCYTES 1.3 (H) 0.0 - 1.0 K/UL    ABS. EOSINOPHILS 0.0 0.0 - 0.4 K/UL    ABS. BASOPHILS 0.0 0.0 - 0.1 K/UL    ABS. IMM. GRANS. 0.1 (H) 0.00 - 0.04 K/UL    DF AUTOMATED     METABOLIC PANEL, COMPREHENSIVE   Result Value Ref Range    Sodium 135 (L) 136 - 145 mmol/L    Potassium 4.5 3.5 - 5.1 mmol/L    Chloride 104 97 - 108 mmol/L    CO2 25 21 - 32 mmol/L    Anion gap 6 5 - 15 mmol/L    Glucose 130 (H) 65 - 100 mg/dL    BUN 16 6 - 20 MG/DL    Creatinine 1.06 0.70 - 1.30 MG/DL    BUN/Creatinine ratio 15 12 - 20      GFR est AA >60 >60 ml/min/1.73m2    GFR est non-AA >60 >60 ml/min/1.73m2    Calcium 8.9 8.5 - 10.1 MG/DL    Bilirubin, total 0.6 0.2 - 1.0 MG/DL    ALT (SGPT) 53 12 - 78 U/L    AST (SGOT) 56 (H) 15 - 37 U/L    Alk.  phosphatase 45 45 - 117 U/L    Protein, total 8.3 (H) 6.4 - 8.2 g/dL    Albumin 3.2 (L) 3.5 - 5.0 g/dL    Globulin 5.1 (H) 2.0 - 4.0 g/dL    A-G Ratio 0.6 (L) 1.1 - 2.2     METABOLIC PANEL, COMPREHENSIVE Result Value Ref Range    Sodium 136 136 - 145 mmol/L    Potassium 4.1 3.5 - 5.1 mmol/L    Chloride 107 97 - 108 mmol/L    CO2 23 21 - 32 mmol/L    Anion gap 6 5 - 15 mmol/L    Glucose 151 (H) 65 - 100 mg/dL    BUN 14 6 - 20 MG/DL    Creatinine 1.04 0.70 - 1.30 MG/DL    BUN/Creatinine ratio 13 12 - 20      GFR est AA >60 >60 ml/min/1.73m2    GFR est non-AA >60 >60 ml/min/1.73m2    Calcium 8.3 (L) 8.5 - 10.1 MG/DL    Bilirubin, total 0.6 0.2 - 1.0 MG/DL    ALT (SGPT) 49 12 - 78 U/L    AST (SGOT) 44 (H) 15 - 37 U/L    Alk. phosphatase 49 45 - 117 U/L    Protein, total 7.7 6.4 - 8.2 g/dL    Albumin 2.9 (L) 3.5 - 5.0 g/dL    Globulin 4.8 (H) 2.0 - 4.0 g/dL    A-G Ratio 0.6 (L) 1.1 - 2.2     CBC W/O DIFF   Result Value Ref Range    WBC 14.5 (H) 4.1 - 11.1 K/uL    RBC 4.37 4.10 - 5.70 M/uL    HGB 13.4 12.1 - 17.0 g/dL    HCT 37.3 36.6 - 50.3 %    MCV 85.4 80.0 - 99.0 FL    MCH 30.7 26.0 - 34.0 PG    MCHC 35.9 30.0 - 36.5 g/dL    RDW 13.5 11.5 - 14.5 %    PLATELET 964 772 - 450 K/uL    MPV 10.1 8.9 - 12.9 FL    NRBC 0.0 0  WBC    ABSOLUTE NRBC 0.00 0.00 - 0.01 K/uL   CBC WITH AUTOMATED DIFF   Result Value Ref Range    WBC 14.2 (H) 4.1 - 11.1 K/uL    RBC 4.45 4.10 - 5.70 M/uL    HGB 13.6 12.1 - 17.0 g/dL    HCT 38.3 36.6 - 50.3 %    MCV 86.1 80.0 - 99.0 FL    MCH 30.6 26.0 - 34.0 PG    MCHC 35.5 30.0 - 36.5 g/dL    RDW 13.5 11.5 - 14.5 %    PLATELET 268 307 - 892 K/uL    MPV 10.3 8.9 - 12.9 FL    NRBC 0.0 0  WBC    ABSOLUTE NRBC 0.00 0.00 - 0.01 K/uL    NEUTROPHILS 74 32 - 75 %    BAND NEUTROPHILS 5 %    LYMPHOCYTES 15 12 - 49 %    MONOCYTES 6 5 - 13 %    EOSINOPHILS 0 0 - 7 %    BASOPHILS 0 0 - 1 %    IMMATURE GRANULOCYTES 0 0.0 - 0.5 %    ABS. NEUTROPHILS 11.2 (H) 1.8 - 8.0 K/UL    ABS. LYMPHOCYTES 2.1 0.8 - 3.5 K/UL    ABS. MONOCYTES 0.9 0.0 - 1.0 K/UL    ABS. EOSINOPHILS 0.0 0.0 - 0.4 K/UL    ABS. BASOPHILS 0.0 0.0 - 0.1 K/UL    ABS. IMM.  GRANS. 0.0 0.00 - 0.04 K/UL    DF MANUAL      RBC COMMENTS OVALOCYTES  PRESENT        WBC COMMENTS REACTIVE LYMPHS     METABOLIC PANEL, COMPREHENSIVE   Result Value Ref Range    Sodium 136 136 - 145 mmol/L    Potassium 4.6 3.5 - 5.1 mmol/L    Chloride 107 97 - 108 mmol/L    CO2 24 21 - 32 mmol/L    Anion gap 5 5 - 15 mmol/L    Glucose 116 (H) 65 - 100 mg/dL    BUN 14 6 - 20 MG/DL    Creatinine 0.97 0.70 - 1.30 MG/DL    BUN/Creatinine ratio 14 12 - 20      GFR est AA >60 >60 ml/min/1.73m2    GFR est non-AA >60 >60 ml/min/1.73m2    Calcium 8.5 8.5 - 10.1 MG/DL    Bilirubin, total 0.7 0.2 - 1.0 MG/DL    ALT (SGPT) 47 12 - 78 U/L    AST (SGOT) 34 15 - 37 U/L    Alk. phosphatase 51 45 - 117 U/L    Protein, total 7.5 6.4 - 8.2 g/dL    Albumin 2.8 (L) 3.5 - 5.0 g/dL    Globulin 4.7 (H) 2.0 - 4.0 g/dL    A-G Ratio 0.6 (L) 1.1 - 2.2     METABOLIC PANEL, COMPREHENSIVE   Result Value Ref Range    Sodium 134 (L) 136 - 145 mmol/L    Potassium 4.1 3.5 - 5.1 mmol/L    Chloride 105 97 - 108 mmol/L    CO2 21 21 - 32 mmol/L    Anion gap 8 5 - 15 mmol/L    Glucose 187 (H) 65 - 100 mg/dL    BUN 16 6 - 20 MG/DL    Creatinine 1.02 0.70 - 1.30 MG/DL    BUN/Creatinine ratio 16 12 - 20      GFR est AA >60 >60 ml/min/1.73m2    GFR est non-AA >60 >60 ml/min/1.73m2    Calcium 8.8 8.5 - 10.1 MG/DL    Bilirubin, total 0.6 0.2 - 1.0 MG/DL    ALT (SGPT) 47 12 - 78 U/L    AST (SGOT) 27 15 - 37 U/L    Alk. phosphatase 51 45 - 117 U/L    Protein, total 7.4 6.4 - 8.2 g/dL    Albumin 2.8 (L) 3.5 - 5.0 g/dL    Globulin 4.6 (H) 2.0 - 4.0 g/dL    A-G Ratio 0.6 (L) 1.1 - 2.2         Assessment/Plan:    ICD-10-CM ICD-9-CM    1. Fall from ground level  W18.30XA E888.9 XR RIBS LT W PA CXR MIN 3 V      ibuprofen (MOTRIN) 800 mg tablet      cyclobenzaprine (FLEXERIL) 10 mg tablet   2. Acute left-sided thoracic back pain  M54.6 724.1    3. Benign prostatic hyperplasia with urinary frequency  N40.1 600.01 REFERRAL TO UROLOGY    R35.0 788.41    4.  Post-inflammatory hyperpigmentation  L81.0 709.00 triamcinolone acetonide (KENALOG) 0.1 % ointment   5. Skin rash  R21 782.1 mupirocin calcium (BACTROBAN) 2 % topical cream     Orders Placed This Encounter    XR RIBS LT W PA CXR MIN 3 V     Standing Status:   Future     Number of Occurrences:   1     Standing Expiration Date:   2/5/2022    REFERRAL TO UROLOGY     Referral Priority:   Routine     Referral Type:   Consultation     Referral Reason:   Specialty Services Required     Referral Location:   [facility]     Referred to Provider:   Som Cavazso MD     Requested Specialty:   Urology     Number of Visits Requested:   1    triamcinolone acetonide (KENALOG) 0.1 % ointment     Sig: Apply  to affected area two (2) times a day. use thin layer to leg area prn     Dispense:  30 g     Refill:  0    mupirocin calcium (BACTROBAN) 2 % topical cream     Sig: Apply  to affected area two (2) times a day. Dispense:  15 g     Refill:  0    ibuprofen (MOTRIN) 800 mg tablet     Sig: Take 1 Tablet by mouth every eight (8) hours as needed for Pain. Take w full meal     Dispense:  30 Tablet     Refill:  1    cyclobenzaprine (FLEXERIL) 10 mg tablet     Sig: Take 1 Tablet by mouth three (3) times daily as needed for Muscle Spasm(s). Dispense:  21 Tablet     Refill:  0     1. Fall from ground level  R/o rib fx  - XR RIBS LT W PA CXR MIN 3 V; Future  - ibuprofen (MOTRIN) 800 mg tablet; Take 1 Tablet by mouth every eight (8) hours as needed for Pain. Take w full meal  Dispense: 30 Tablet; Refill: 1  - cyclobenzaprine (FLEXERIL) 10 mg tablet; Take 1 Tablet by mouth three (3) times daily as needed for Muscle Spasm(s). Dispense: 21 Tablet; Refill: 0    2. Acute left-sided thoracic back pain  As above    3. Benign prostatic hyperplasia with urinary frequency    - REFERRAL TO UROLOGY    4. Post-inflammatory hyperpigmentation    - triamcinolone acetonide (KENALOG) 0.1 % ointment; Apply  to affected area two (2) times a day. use thin layer to leg area prn  Dispense: 30 g; Refill: 0    5. Skin rash  To navel area  - mupirocin calcium (BACTROBAN) 2 % topical cream; Apply  to affected area two (2) times a day. Dispense: 15 g; Refill: 0      There are no Patient Instructions on file for this visit. Follow-up and Dispositions    · Return in about 3 months (around 4/5/2022) for bp. I have reviewed with the patient details of the assessment and plan and all questions were answered. Relevent patient education was performed. The most recent lab findings were reviewed with the patient. An After Visit Summary was printed and given to the patient.

## 2022-01-05 NOTE — PROGRESS NOTES
Chief Complaint   Patient presents with   [facility] Horton Fall     1/4/22- left side pain     Urinary Frequency    Skin Problem       1. Have you been to the ER, urgent care clinic since your last visit? Hospitalized since your last visit? No    2. Have you seen or consulted any other health care providers outside of the [address] [place] [address] since your last visit? Include any pap smears or colon screening.  No

## 2022-01-05 NOTE — LETTER
NOTIFICATION RETURN TO WORK / SCHOOL    1/5/2022 11:52 AM    Mr. Hiro Morales  [address]      To Whom It May Concern:    Hiro Morales is currently under the care of Calli Mcneill. He will return to work/school on: 1/8/2022. If there are questions or concerns please have the patient contact our office.         Sincerely,      Viktor Weeks MD

## 2022-03-18 PROBLEM — E66.9 OBESITY (BMI 30.0-34.9): Status: ACTIVE | Noted: 2021-04-27

## 2022-03-18 PROBLEM — U07.1 COVID-19: Status: ACTIVE | Noted: 2021-03-17

## 2022-03-18 PROBLEM — E66.811 OBESITY (BMI 30.0-34.9): Status: ACTIVE | Noted: 2021-04-27

## 2022-03-20 PROBLEM — I10 ESSENTIAL HYPERTENSION: Status: ACTIVE | Noted: 2021-07-30

## 2022-04-06 ENCOUNTER — OFFICE VISIT (OUTPATIENT)
Dept: INTERNAL MEDICINE CLINIC | Age: 62
End: 2022-04-06
Payer: COMMERCIAL

## 2022-04-06 VITALS
HEIGHT: 72 IN | HEART RATE: 68 BPM | OXYGEN SATURATION: 99 % | WEIGHT: 253 LBS | DIASTOLIC BLOOD PRESSURE: 81 MMHG | SYSTOLIC BLOOD PRESSURE: 128 MMHG | TEMPERATURE: 98.9 F | RESPIRATION RATE: 18 BRPM | BODY MASS INDEX: 34.27 KG/M2

## 2022-04-06 DIAGNOSIS — I10 ESSENTIAL HYPERTENSION: Primary | ICD-10-CM

## 2022-04-06 PROCEDURE — 99213 OFFICE O/P EST LOW 20 MIN: CPT | Performed by: INTERNAL MEDICINE

## 2022-04-06 NOTE — PROGRESS NOTES
Knady Weeks is a 63 y.o. male and presents with Hypertension (follow up) and Depression  . Subjective:    Pt reports his son is currently incarcerated and he has been feeling stressed. He declines therapist referral    Pt is scheduled for rt foot surgery    PMH:   HTN-on lotrel 10/20  BP Readings from Last 3 Encounters:   22 128/81   22 138/87   21 (!) 160/105      Covid infection and pneumonia 3/2021    PSH- appendectomy    SH-    No tob occas no heroin/cocaine    FH- father  ? ? Mother  80 natural causes   8 siblings 1 sister  aneurysm, 1 brother ESRD due to drug use    HM    Colonoscopy < 10 yrs ago  Immunizations  Eye care  Dental care        Review of Systems  Review of systems (12) negative, except noted above. Past Medical History:   Diagnosis Date    Gastrointestinal disorder     GERDS    GERD (gastroesophageal reflux disease)     Ill-defined condition     MRSA     Past Surgical History:   Procedure Laterality Date    HX APPENDECTOMY      HX HEENT      right eye surgery    HX OTHER SURGICAL      right eye surgery    HX UROLOGICAL      laser surgery on \"my private parts\"     Social History     Socioeconomic History    Marital status:    Tobacco Use    Smoking status: Never Smoker    Smokeless tobacco: Never Used   Vaping Use    Vaping Use: Never used   Substance and Sexual Activity    Alcohol use: Yes     Comment: wine coolers    Drug use: Yes     Types: Marijuana    Sexual activity: Yes     History reviewed. No pertinent family history. Current Outpatient Medications   Medication Sig Dispense Refill    amLODIPine-benazepril (LOTREL) 10-20 mg per capsule TAKE 1 CAPSULE BY MOUTH DAILY 90 Capsule 0    omeprazole (PRILOSEC) 20 mg capsule Take 1 Capsule by mouth daily. 30 Capsule 2    albuterol (PROVENTIL HFA, VENTOLIN HFA, PROAIR HFA) 90 mcg/actuation inhaler Take 1 Puff by inhalation every six (6) hours as needed for Wheezing.  1 Inhaler 5  ibuprofen (MOTRIN) 800 mg tablet Take 1 Tablet by mouth every eight (8) hours as needed for Pain. Take w full meal (Patient not taking: Reported on 4/6/2022) 30 Tablet 1    diclofenac EC (VOLTAREN) 75 mg EC tablet Take 1 Tablet by mouth two (2) times a day.  (Patient not taking: Reported on 4/6/2022) 30 Tablet 0     No Known Allergies    Objective:  Visit Vitals  /81 (BP 1 Location: Right upper arm, BP Patient Position: Sitting, BP Cuff Size: Large adult)   Pulse 68   Temp 98.9 °F (37.2 °C) (Temporal)   Resp 18   Ht 6' (1.829 m)   Wt 253 lb (114.8 kg)   SpO2 99%   BMI 34.31 kg/m²     Physical Exam:   General appearance - alert, well appearing, and in no distress  Mental status - alert, oriented to person, place, and time  EYE-EOMI  Neck - supple, no significant adenopathy   Chest - clear to auscultation, n+ tenderness left flank area over lower rib  Heart - normal rate, regular rhythm, normal S1, S2  Abdomen - soft, nontender, obese +bs + umbil hernia w cracking of skin in superio-lateral fild  Ext-peripheral pulses normal, no pedal edema, no clubbing or cyanosis  Skin-Warm and dry. + hyperpig round raised, dry rash ~ 2 cm diameter  Neuro -alert, oriented, normal speech, no focal findings or movement disorder noted      Results for orders placed or performed during the hospital encounter of 03/17/21   COVID-19 RAPID TEST   Result Value Ref Range    Specimen source Nasopharyngeal      COVID-19 rapid test Not detected NOTD     SARS-COV-2   Result Value Ref Range    SARS-CoV-2 by PCR Please find results under separate order     CBC WITH AUTOMATED DIFF   Result Value Ref Range    WBC 3.9 (L) 4.1 - 11.1 K/uL    RBC 4.42 4.10 - 5.70 M/uL    HGB 13.3 12.1 - 17.0 g/dL    HCT 37.8 36.6 - 50.3 %    MCV 85.5 80.0 - 99.0 FL    MCH 30.1 26.0 - 34.0 PG    MCHC 35.2 30.0 - 36.5 g/dL    RDW 13.4 11.5 - 14.5 %    PLATELET 738 563 - 247 K/uL    MPV 10.8 8.9 - 12.9 FL    NRBC 0.0 0  WBC    ABSOLUTE NRBC 0.00 0.00 - 0.01 K/uL    NEUTROPHILS 78 (H) 32 - 75 %    LYMPHOCYTES 16 12 - 49 %    MONOCYTES 5 5 - 13 %    EOSINOPHILS 0 0 - 7 %    BASOPHILS 0 0 - 1 %    IMMATURE GRANULOCYTES 1 (H) 0.0 - 0.5 %    ABS. NEUTROPHILS 3.1 1.8 - 8.0 K/UL    ABS. LYMPHOCYTES 0.6 (L) 0.8 - 3.5 K/UL    ABS. MONOCYTES 0.2 0.0 - 1.0 K/UL    ABS. EOSINOPHILS 0.0 0.0 - 0.4 K/UL    ABS. BASOPHILS 0.0 0.0 - 0.1 K/UL    ABS. IMM. GRANS. 0.0 0.00 - 0.04 K/UL    DF SMEAR SCANNED      RBC COMMENTS OVALOCYTES  PRESENT        RBC COMMENTS POLYCHROMASIA  PRESENT        RBC COMMENTS ANISOCYTOSIS  1+       METABOLIC PANEL, COMPREHENSIVE   Result Value Ref Range    Sodium 132 (L) 136 - 145 mmol/L    Potassium 4.1 3.5 - 5.1 mmol/L    Chloride 104 97 - 108 mmol/L    CO2 21 21 - 32 mmol/L    Anion gap 7 5 - 15 mmol/L    Glucose 184 (H) 65 - 100 mg/dL    BUN 15 6 - 20 MG/DL    Creatinine 1.02 0.70 - 1.30 MG/DL    BUN/Creatinine ratio 15 12 - 20      GFR est AA >60 >60 ml/min/1.73m2    GFR est non-AA >60 >60 ml/min/1.73m2    Calcium 8.8 8.5 - 10.1 MG/DL    Bilirubin, total 0.8 0.2 - 1.0 MG/DL    ALT (SGPT) 58 12 - 78 U/L    AST (SGOT) 72 (H) 15 - 37 U/L    Alk.  phosphatase 43 (L) 45 - 117 U/L    Protein, total 7.9 6.4 - 8.2 g/dL    Albumin 3.0 (L) 3.5 - 5.0 g/dL    Globulin 4.9 (H) 2.0 - 4.0 g/dL    A-G Ratio 0.6 (L) 1.1 - 2.2     PROCALCITONIN   Result Value Ref Range    Procalcitonin <0.05 ng/mL   FERRITIN   Result Value Ref Range    Ferritin 474 (H) 26 - 388 NG/ML   D DIMER   Result Value Ref Range    D-dimer 0.56 0.00 - 0.65 mg/L FEU   SARS-COV-2   Result Value Ref Range    SARS-CoV-2 by PCR Please find results under separate order     SARS-COV-2   Result Value Ref Range    Specimen source Nasopharyngeal      SARS-CoV-2 Detected (AA) NOTD     CBC WITH AUTOMATED DIFF   Result Value Ref Range    WBC 11.7 (H) 4.1 - 11.1 K/uL    RBC 4.56 4.10 - 5.70 M/uL    HGB 14.0 12.1 - 17.0 g/dL    HCT 39.7 36.6 - 50.3 %    MCV 87.1 80.0 - 99.0 FL    MCH 30.7 26.0 - 34.0 PG    MCHC 35.3 30.0 - 36.5 g/dL    RDW 13.3 11.5 - 14.5 %    PLATELET 241 382 - 054 K/uL    MPV 11.1 8.9 - 12.9 FL    NRBC 0.0 0  WBC    ABSOLUTE NRBC 0.00 0.00 - 0.01 K/uL    NEUTROPHILS 79 (H) 32 - 75 %    LYMPHOCYTES 9 (L) 12 - 49 %    MONOCYTES 11 5 - 13 %    EOSINOPHILS 0 0 - 7 %    BASOPHILS 0 0 - 1 %    IMMATURE GRANULOCYTES 1 (H) 0.0 - 0.5 %    ABS. NEUTROPHILS 9.3 (H) 1.8 - 8.0 K/UL    ABS. LYMPHOCYTES 1.1 0.8 - 3.5 K/UL    ABS. MONOCYTES 1.3 (H) 0.0 - 1.0 K/UL    ABS. EOSINOPHILS 0.0 0.0 - 0.4 K/UL    ABS. BASOPHILS 0.0 0.0 - 0.1 K/UL    ABS. IMM. GRANS. 0.1 (H) 0.00 - 0.04 K/UL    DF AUTOMATED     METABOLIC PANEL, COMPREHENSIVE   Result Value Ref Range    Sodium 135 (L) 136 - 145 mmol/L    Potassium 4.5 3.5 - 5.1 mmol/L    Chloride 104 97 - 108 mmol/L    CO2 25 21 - 32 mmol/L    Anion gap 6 5 - 15 mmol/L    Glucose 130 (H) 65 - 100 mg/dL    BUN 16 6 - 20 MG/DL    Creatinine 1.06 0.70 - 1.30 MG/DL    BUN/Creatinine ratio 15 12 - 20      GFR est AA >60 >60 ml/min/1.73m2    GFR est non-AA >60 >60 ml/min/1.73m2    Calcium 8.9 8.5 - 10.1 MG/DL    Bilirubin, total 0.6 0.2 - 1.0 MG/DL    ALT (SGPT) 53 12 - 78 U/L    AST (SGOT) 56 (H) 15 - 37 U/L    Alk. phosphatase 45 45 - 117 U/L    Protein, total 8.3 (H) 6.4 - 8.2 g/dL    Albumin 3.2 (L) 3.5 - 5.0 g/dL    Globulin 5.1 (H) 2.0 - 4.0 g/dL    A-G Ratio 0.6 (L) 1.1 - 2.2     METABOLIC PANEL, COMPREHENSIVE   Result Value Ref Range    Sodium 136 136 - 145 mmol/L    Potassium 4.1 3.5 - 5.1 mmol/L    Chloride 107 97 - 108 mmol/L    CO2 23 21 - 32 mmol/L    Anion gap 6 5 - 15 mmol/L    Glucose 151 (H) 65 - 100 mg/dL    BUN 14 6 - 20 MG/DL    Creatinine 1.04 0.70 - 1.30 MG/DL    BUN/Creatinine ratio 13 12 - 20      GFR est AA >60 >60 ml/min/1.73m2    GFR est non-AA >60 >60 ml/min/1.73m2    Calcium 8.3 (L) 8.5 - 10.1 MG/DL    Bilirubin, total 0.6 0.2 - 1.0 MG/DL    ALT (SGPT) 49 12 - 78 U/L    AST (SGOT) 44 (H) 15 - 37 U/L    Alk.  phosphatase 49 45 - 117 U/L    Protein, total 7.7 6.4 - 8.2 g/dL    Albumin 2.9 (L) 3.5 - 5.0 g/dL    Globulin 4.8 (H) 2.0 - 4.0 g/dL    A-G Ratio 0.6 (L) 1.1 - 2.2     CBC W/O DIFF   Result Value Ref Range    WBC 14.5 (H) 4.1 - 11.1 K/uL    RBC 4.37 4.10 - 5.70 M/uL    HGB 13.4 12.1 - 17.0 g/dL    HCT 37.3 36.6 - 50.3 %    MCV 85.4 80.0 - 99.0 FL    MCH 30.7 26.0 - 34.0 PG    MCHC 35.9 30.0 - 36.5 g/dL    RDW 13.5 11.5 - 14.5 %    PLATELET 558 045 - 282 K/uL    MPV 10.1 8.9 - 12.9 FL    NRBC 0.0 0  WBC    ABSOLUTE NRBC 0.00 0.00 - 0.01 K/uL   CBC WITH AUTOMATED DIFF   Result Value Ref Range    WBC 14.2 (H) 4.1 - 11.1 K/uL    RBC 4.45 4.10 - 5.70 M/uL    HGB 13.6 12.1 - 17.0 g/dL    HCT 38.3 36.6 - 50.3 %    MCV 86.1 80.0 - 99.0 FL    MCH 30.6 26.0 - 34.0 PG    MCHC 35.5 30.0 - 36.5 g/dL    RDW 13.5 11.5 - 14.5 %    PLATELET 406 134 - 885 K/uL    MPV 10.3 8.9 - 12.9 FL    NRBC 0.0 0  WBC    ABSOLUTE NRBC 0.00 0.00 - 0.01 K/uL    NEUTROPHILS 74 32 - 75 %    BAND NEUTROPHILS 5 %    LYMPHOCYTES 15 12 - 49 %    MONOCYTES 6 5 - 13 %    EOSINOPHILS 0 0 - 7 %    BASOPHILS 0 0 - 1 %    IMMATURE GRANULOCYTES 0 0.0 - 0.5 %    ABS. NEUTROPHILS 11.2 (H) 1.8 - 8.0 K/UL    ABS. LYMPHOCYTES 2.1 0.8 - 3.5 K/UL    ABS. MONOCYTES 0.9 0.0 - 1.0 K/UL    ABS. EOSINOPHILS 0.0 0.0 - 0.4 K/UL    ABS. BASOPHILS 0.0 0.0 - 0.1 K/UL    ABS. IMM.  GRANS. 0.0 0.00 - 0.04 K/UL    DF MANUAL      RBC COMMENTS OVALOCYTES  PRESENT        WBC COMMENTS REACTIVE LYMPHS     METABOLIC PANEL, COMPREHENSIVE   Result Value Ref Range    Sodium 136 136 - 145 mmol/L    Potassium 4.6 3.5 - 5.1 mmol/L    Chloride 107 97 - 108 mmol/L    CO2 24 21 - 32 mmol/L    Anion gap 5 5 - 15 mmol/L    Glucose 116 (H) 65 - 100 mg/dL    BUN 14 6 - 20 MG/DL    Creatinine 0.97 0.70 - 1.30 MG/DL    BUN/Creatinine ratio 14 12 - 20      GFR est AA >60 >60 ml/min/1.73m2    GFR est non-AA >60 >60 ml/min/1.73m2    Calcium 8.5 8.5 - 10.1 MG/DL    Bilirubin, total 0.7 0.2 - 1.0 MG/DL    ALT (SGPT) 47 12 - 78 U/L AST (SGOT) 34 15 - 37 U/L    Alk. phosphatase 51 45 - 117 U/L    Protein, total 7.5 6.4 - 8.2 g/dL    Albumin 2.8 (L) 3.5 - 5.0 g/dL    Globulin 4.7 (H) 2.0 - 4.0 g/dL    A-G Ratio 0.6 (L) 1.1 - 2.2     METABOLIC PANEL, COMPREHENSIVE   Result Value Ref Range    Sodium 134 (L) 136 - 145 mmol/L    Potassium 4.1 3.5 - 5.1 mmol/L    Chloride 105 97 - 108 mmol/L    CO2 21 21 - 32 mmol/L    Anion gap 8 5 - 15 mmol/L    Glucose 187 (H) 65 - 100 mg/dL    BUN 16 6 - 20 MG/DL    Creatinine 1.02 0.70 - 1.30 MG/DL    BUN/Creatinine ratio 16 12 - 20      GFR est AA >60 >60 ml/min/1.73m2    GFR est non-AA >60 >60 ml/min/1.73m2    Calcium 8.8 8.5 - 10.1 MG/DL    Bilirubin, total 0.6 0.2 - 1.0 MG/DL    ALT (SGPT) 47 12 - 78 U/L    AST (SGOT) 27 15 - 37 U/L    Alk. phosphatase 51 45 - 117 U/L    Protein, total 7.4 6.4 - 8.2 g/dL    Albumin 2.8 (L) 3.5 - 5.0 g/dL    Globulin 4.6 (H) 2.0 - 4.0 g/dL    A-G Ratio 0.6 (L) 1.1 - 2.2         Assessment/Plan:  No diagnosis found. No orders of the defined types were placed in this encounter. 1. Essential hypertension  Controlled on current regimen        There are no Patient Instructions on file for this visit. Follow-up and Dispositions    · Return today (on 4/6/2022) for bp check . I have reviewed with the patient details of the assessment and plan and all questions were answered. Relevent patient education was performed. The most recent lab findings were reviewed with the patient. An After Visit Summary was printed and given to the patient.

## 2022-04-06 NOTE — PROGRESS NOTES
3 most recent PHQ Screens 4/6/2022   Little interest or pleasure in doing things Not at all   Feeling down, depressed, irritable, or hopeless Nearly every day   Total Score PHQ 2 3   Trouble falling or staying asleep, or sleeping too much Nearly every day   Feeling tired or having little energy Nearly every day   Poor appetite, weight loss, or overeating Several days   Feeling bad about yourself - or that you are a failure or have let yourself or your family down Not at all   Trouble concentrating on things such as school, work, reading, or watching TV Not at all   Moving or speaking so slowly that other people could have noticed; or the opposite being so fidgety that others notice Not at all   Thoughts of being better off dead, or hurting yourself in some way Not at all   PHQ 9 Score 10   How difficult have these problems made it for you to do your work, take care of your home and get along with others Not difficult at all       Pt is here for   Chief Complaint   Patient presents with    Hypertension     follow up    Depression     7/10 pain of both feet    1. Have you been to the ER, urgent care clinic since your last visit? Hospitalized since your last visit? No    2. Have you seen or consulted any other health care providers outside of the [address] [place] [address] since your last visit? Include any pap smears or colon screening.  No

## 2022-04-09 DIAGNOSIS — I10 ESSENTIAL HYPERTENSION: ICD-10-CM

## 2022-04-11 RX ORDER — AMLODIPINE AND BENAZEPRIL HYDROCHLORIDE 10; 20 MG/1; MG/1
1 CAPSULE ORAL DAILY
Qty: 90 CAPSULE | Refills: 0 | Status: SHIPPED | OUTPATIENT
Start: 2022-04-11 | End: 2022-10-18 | Stop reason: SDUPTHER

## 2022-04-15 ENCOUNTER — ANESTHESIA EVENT (OUTPATIENT)
Dept: SURGERY | Age: 62
End: 2022-04-15
Payer: COMMERCIAL

## 2022-04-18 ENCOUNTER — ANESTHESIA (OUTPATIENT)
Dept: SURGERY | Age: 62
End: 2022-04-18
Payer: COMMERCIAL

## 2022-04-18 ENCOUNTER — HOSPITAL ENCOUNTER (OUTPATIENT)
Age: 62
Setting detail: OUTPATIENT SURGERY
Discharge: HOME | End: 2022-04-18
Attending: PODIATRIST | Admitting: PODIATRIST
Payer: COMMERCIAL

## 2022-04-18 VITALS
SYSTOLIC BLOOD PRESSURE: 126 MMHG | RESPIRATION RATE: 18 BRPM | DIASTOLIC BLOOD PRESSURE: 90 MMHG | OXYGEN SATURATION: 99 % | HEART RATE: 77 BPM | TEMPERATURE: 97.6 F | HEIGHT: 72 IN | WEIGHT: 251 LBS | BODY MASS INDEX: 34 KG/M2

## 2022-04-18 DIAGNOSIS — M20.11 HALLUX VALGUS OF RIGHT FOOT: Primary | ICD-10-CM

## 2022-04-18 PROCEDURE — 77030002916 HC SUT ETHLN J&J -A: Performed by: PODIATRIST

## 2022-04-18 PROCEDURE — 76010000132 HC OR TIME 2.5 TO 3 HR: Performed by: PODIATRIST

## 2022-04-18 PROCEDURE — 2709999900 HC NON-CHARGEABLE SUPPLY: Performed by: PODIATRIST

## 2022-04-18 PROCEDURE — 77030036687 HC SHOE PSTOP S2SG -A

## 2022-04-18 PROCEDURE — 76060000036 HC ANESTHESIA 2.5 TO 3 HR: Performed by: PODIATRIST

## 2022-04-18 PROCEDURE — C1713 ANCHOR/SCREW BN/BN,TIS/BN: HCPCS | Performed by: PODIATRIST

## 2022-04-18 PROCEDURE — 76210000063 HC OR PH I REC FIRST 0.5 HR: Performed by: PODIATRIST

## 2022-04-18 PROCEDURE — 76210000020 HC REC RM PH II FIRST 0.5 HR: Performed by: PODIATRIST

## 2022-04-18 PROCEDURE — 77030000032 HC CUF TRNQT ZIMM -B: Performed by: PODIATRIST

## 2022-04-18 PROCEDURE — 74011000250 HC RX REV CODE- 250: Performed by: PODIATRIST

## 2022-04-18 PROCEDURE — 77030009023 HC CAP PROTCT PIN MCRA -A: Performed by: PODIATRIST

## 2022-04-18 PROCEDURE — 77030002933 HC SUT MCRYL J&J -A: Performed by: PODIATRIST

## 2022-04-18 PROCEDURE — 77030031139 HC SUT VCRL2 J&J -A: Performed by: PODIATRIST

## 2022-04-18 PROCEDURE — 74011250636 HC RX REV CODE- 250/636: Performed by: PODIATRIST

## 2022-04-18 PROCEDURE — 74011250636 HC RX REV CODE- 250/636: Performed by: ANESTHESIOLOGY

## 2022-04-18 PROCEDURE — 77030010396 HC WRE FIX C CNMD -A: Performed by: PODIATRIST

## 2022-04-18 PROCEDURE — 77030006788 HC BLD SAW OSC STRY -B: Performed by: PODIATRIST

## 2022-04-18 DEVICE — C-WIRE PAK DOUBLE ENDED ORTHOPAEDIC WIRE, TROCAR, .062" (1.57 MM)
Type: IMPLANTABLE DEVICE | Status: FUNCTIONAL
Brand: C-WIRE

## 2022-04-18 DEVICE — 2.4MM X 16MM LAG SCREW
Type: IMPLANTABLE DEVICE | Site: FOOT | Status: FUNCTIONAL
Brand: OSTEOMED

## 2022-04-18 DEVICE — C-WIRE PAK DOUBLE ENDED ORTHOPAEDIC WIRE, TROCAR, .045" (1.14 MM)
Type: IMPLANTABLE DEVICE | Status: FUNCTIONAL
Brand: C-WIRE

## 2022-04-18 DEVICE — DYNAFORCE IS A NITINOL SUPERELASTIC BONE STAPLE FOR USE IN SURGERY OF THE HAND AND FOOT FOR BONE FRAGMENT OSTEOTOMY FIXATION AND JOINT ARTHRODESIS
Type: IMPLANTABLE DEVICE | Site: FOOT | Status: FUNCTIONAL
Brand: DYNAFORCE™

## 2022-04-18 RX ORDER — SODIUM CHLORIDE 0.9 % (FLUSH) 0.9 %
5-40 SYRINGE (ML) INJECTION EVERY 8 HOURS
Status: DISCONTINUED | OUTPATIENT
Start: 2022-04-18 | End: 2022-04-18 | Stop reason: HOSPADM

## 2022-04-18 RX ORDER — MIDAZOLAM HYDROCHLORIDE 1 MG/ML
INJECTION, SOLUTION INTRAMUSCULAR; INTRAVENOUS AS NEEDED
Status: DISCONTINUED | OUTPATIENT
Start: 2022-04-18 | End: 2022-04-18 | Stop reason: HOSPADM

## 2022-04-18 RX ORDER — SODIUM CHLORIDE 9 MG/ML
50 INJECTION, SOLUTION INTRAVENOUS CONTINUOUS
Status: DISCONTINUED | OUTPATIENT
Start: 2022-04-18 | End: 2022-04-18 | Stop reason: HOSPADM

## 2022-04-18 RX ORDER — SODIUM CHLORIDE 0.9 % (FLUSH) 0.9 %
5-40 SYRINGE (ML) INJECTION AS NEEDED
Status: DISCONTINUED | OUTPATIENT
Start: 2022-04-18 | End: 2022-04-18 | Stop reason: HOSPADM

## 2022-04-18 RX ORDER — OXYCODONE AND ACETAMINOPHEN 5; 325 MG/1; MG/1
1 TABLET ORAL
Qty: 25 TABLET | Refills: 0 | Status: SHIPPED | OUTPATIENT
Start: 2022-04-18 | End: 2022-04-25

## 2022-04-18 RX ORDER — KETOROLAC TROMETHAMINE 30 MG/ML
INJECTION, SOLUTION INTRAMUSCULAR; INTRAVENOUS AS NEEDED
Status: DISCONTINUED | OUTPATIENT
Start: 2022-04-18 | End: 2022-04-18 | Stop reason: HOSPADM

## 2022-04-18 RX ORDER — PROPOFOL 10 MG/ML
INJECTION, EMULSION INTRAVENOUS AS NEEDED
Status: DISCONTINUED | OUTPATIENT
Start: 2022-04-18 | End: 2022-04-18 | Stop reason: HOSPADM

## 2022-04-18 RX ORDER — CEFAZOLIN SODIUM/WATER 2 G/20 ML
SYRINGE (ML) INTRAVENOUS
Status: DISCONTINUED
Start: 2022-04-18 | End: 2022-04-18 | Stop reason: HOSPADM

## 2022-04-18 RX ORDER — CEFAZOLIN SODIUM 1 G/3ML
INJECTION, POWDER, FOR SOLUTION INTRAMUSCULAR; INTRAVENOUS AS NEEDED
Status: DISCONTINUED | OUTPATIENT
Start: 2022-04-18 | End: 2022-04-18 | Stop reason: HOSPADM

## 2022-04-18 RX ORDER — FENTANYL CITRATE 50 UG/ML
INJECTION, SOLUTION INTRAMUSCULAR; INTRAVENOUS AS NEEDED
Status: DISCONTINUED | OUTPATIENT
Start: 2022-04-18 | End: 2022-04-18 | Stop reason: HOSPADM

## 2022-04-18 RX ORDER — SODIUM CHLORIDE, SODIUM LACTATE, POTASSIUM CHLORIDE, CALCIUM CHLORIDE 600; 310; 30; 20 MG/100ML; MG/100ML; MG/100ML; MG/100ML
INJECTION, SOLUTION INTRAVENOUS
Status: DISCONTINUED | OUTPATIENT
Start: 2022-04-18 | End: 2022-04-18 | Stop reason: HOSPADM

## 2022-04-18 RX ORDER — SODIUM CHLORIDE, SODIUM LACTATE, POTASSIUM CHLORIDE, CALCIUM CHLORIDE 600; 310; 30; 20 MG/100ML; MG/100ML; MG/100ML; MG/100ML
50 INJECTION, SOLUTION INTRAVENOUS CONTINUOUS
Status: DISCONTINUED | OUTPATIENT
Start: 2022-04-18 | End: 2022-04-18 | Stop reason: HOSPADM

## 2022-04-18 RX ORDER — CEFAZOLIN SODIUM/WATER 2 G/20 ML
2 SYRINGE (ML) INTRAVENOUS ONCE
Status: DISCONTINUED | OUTPATIENT
Start: 2022-04-18 | End: 2022-04-18 | Stop reason: HOSPADM

## 2022-04-18 RX ORDER — CEFAZOLIN SODIUM 1 G/3ML
2 INJECTION, POWDER, FOR SOLUTION INTRAMUSCULAR; INTRAVENOUS ONCE
Status: DISCONTINUED | OUTPATIENT
Start: 2022-04-18 | End: 2022-04-18 | Stop reason: SDUPTHER

## 2022-04-18 RX ORDER — PROPOFOL 10 MG/ML
INJECTION, EMULSION INTRAVENOUS
Status: DISCONTINUED | OUTPATIENT
Start: 2022-04-18 | End: 2022-04-18 | Stop reason: HOSPADM

## 2022-04-18 RX ORDER — BUPIVACAINE HYDROCHLORIDE 2.5 MG/ML
INJECTION, SOLUTION EPIDURAL; INFILTRATION; INTRACAUDAL AS NEEDED
Status: DISCONTINUED | OUTPATIENT
Start: 2022-04-18 | End: 2022-04-18 | Stop reason: HOSPADM

## 2022-04-18 RX ORDER — IBUPROFEN 200 MG
600 TABLET ORAL
Qty: 90 TABLET | Refills: 1 | Status: SHIPPED | OUTPATIENT
Start: 2022-04-18 | End: 2022-10-18 | Stop reason: SDUPTHER

## 2022-04-18 RX ADMIN — PROPOFOL 80 MG: 10 INJECTION, EMULSION INTRAVENOUS at 10:07

## 2022-04-18 RX ADMIN — CEFAZOLIN 2 G: 330 INJECTION, POWDER, FOR SOLUTION INTRAMUSCULAR; INTRAVENOUS at 10:06

## 2022-04-18 RX ADMIN — MIDAZOLAM HYDROCHLORIDE 2 MG: 2 INJECTION, SOLUTION INTRAMUSCULAR; INTRAVENOUS at 10:01

## 2022-04-18 RX ADMIN — PROPOFOL 75 MCG/KG/MIN: 10 INJECTION, EMULSION INTRAVENOUS at 10:08

## 2022-04-18 RX ADMIN — FENTANYL CITRATE 100 MCG: 50 INJECTION, SOLUTION INTRAMUSCULAR; INTRAVENOUS at 10:06

## 2022-04-18 RX ADMIN — KETOROLAC TROMETHAMINE 30 MG: 30 INJECTION INTRAMUSCULAR; INTRAVENOUS at 12:18

## 2022-04-18 RX ADMIN — SODIUM CHLORIDE, POTASSIUM CHLORIDE, SODIUM LACTATE AND CALCIUM CHLORIDE: 600; 310; 30; 20 INJECTION, SOLUTION INTRAVENOUS at 09:17

## 2022-04-18 NOTE — ANESTHESIA POSTPROCEDURE EVALUATION
Procedure(s):  BUNIONECTOMY RIGHT FOOT, HAMMERTOE CORRECTION RIGHT TOES SECOND, THIRD AND FOURTH; CORITSONE INJECTION LEFT HEEL.     MAC    Anesthesia Post Evaluation      Multimodal analgesia: multimodal analgesia not used between 6 hours prior to anesthesia start to PACU discharge  Patient location during evaluation: PACU  Patient participation: waiting for patient participation  Level of consciousness: awake  Pain management: adequate  Airway patency: patent  Anesthetic complications: no  Cardiovascular status: acceptable  Respiratory status: acceptable  Hydration status: acceptable  Post anesthesia nausea and vomiting:  none  Final Post Anesthesia Temperature Assessment:  Normothermia (36.0-37.5 degrees C)      INITIAL Post-op Vital signs:   Vitals Value Taken Time   /97 04/18/22 1255   Temp     Pulse 77 04/18/22 1255   Resp 16 04/18/22 1255   SpO2 100 % 04/18/22 1255

## 2022-04-18 NOTE — H&P
History and Physical    Subjective:     Isidra Lorenzo is a 63 y.o.  male who presents with painful bunion right foot and toes and left heel Onset of symptoms was gradual with gradually worsening course since that time. The pain is located right foot and left heel Patient describes the pain as continuous and rated as moderate. Pain has been associated with wearing shoes and walking. Patient denies any type of trauma to the right foot. Symptoms are aggravated by shoes. Previous studies include xray. Past Medical History:   Diagnosis Date    COVID-19 03/2021    Gastrointestinal disorder     GERDS    GERD (gastroesophageal reflux disease)     Hypertension     Ill-defined condition     MRSA      Past Surgical History:   Procedure Laterality Date    HX APPENDECTOMY      HX HEENT      right eye surgery    HX OTHER SURGICAL      right eye surgery    HX UROLOGICAL      laser surgery on \"my private parts\"     History reviewed. No pertinent family history. Social History     Tobacco Use    Smoking status: Never Smoker    Smokeless tobacco: Never Used   Substance Use Topics    Alcohol use: Yes     Comment: occasional       Prior to Admission medications    Medication Sig Start Date End Date Taking? Authorizing Provider   amLODIPine-benazepril (LOTREL) 10-20 mg per capsule TAKE 1 CAPSULE BY MOUTH DAILY 4/11/22  Yes Les Stiles NP   omeprazole (PRILOSEC) 20 mg capsule Take 1 Capsule by mouth daily. 11/12/21  Yes Stanley Royal MD   albuterol (PROVENTIL HFA, VENTOLIN HFA, PROAIR HFA) 90 mcg/actuation inhaler Take 1 Puff by inhalation every six (6) hours as needed for Wheezing. 7/30/21  Yes Stanley Royal MD     No Known Allergies     Review of Systems:  Pertinent items are noted in the History of Present Illness. Objective: Intake and Output:    No intake/output data recorded. No intake/output data recorded.     Physical Exam:   Visit Vitals  BP (!) 140/95 (BP 1 Location: Left upper arm, BP Patient Position: At rest)   Pulse 78   Temp 97.6 °F (36.4 °C)   Resp 16   Ht 6' (1.829 m)   Wt 113.9 kg (251 lb)   SpO2 98%   BMI 34.04 kg/m²     General:  Alert, cooperative, no distress, appears stated age. Head:  Normocephalic, without obvious abnormality, atraumatic. Eyes:  Conjunctivae/corneas clear. PERRL, EOMs intact. Ears:  Normal  external ear canals both ears. Nose: Nares normal. Septum midline. Mucosa normal. No drainage or sinus tenderness. Throat: Lips, mucosa, and tongue normal. Upper partials   Neck: Supple, symmetrical, trachea midline, no adenopathy, thyroid: no enlargement/tenderness/nodules,    Back:   Symmetric, no curvature. ROM normal.    Lungs:   Clear to auscultation bilaterally. Chest wall:  No tenderness or deformity. Heart:  Regular rate and rhythm, S1, S2 normal, no murmur, click, rub or gallop. Abdomen:   Soft, non-tender. Bowel sounds normal. No masses,  No organomegaly. Extremities: Extremities normal, atraumatic, no cyanosis or edema. Pulses: 2+ and symmetric all extremities. Skin: Skin color, texture, turgor normal. No rashes or lesions   Lymph nodes: Cervical, supraclavicular, and axillary nodes normal.   Neurologic: CNII-XII intact. Normal strength, sensation and reflexes throughout. LOWER EXTREMITIES:  Palpable pedal pulses with epicritic sensation intact   Good muscle strength  Pain plantar left arch and heel  Abducted hallux bilateral with pain medial MPJ  Flexion bilateral toes 2,3,4 with painful distal clavi and painful hyperkeratosis dorsal PIPJ     XRAY right foot:  1st IM angle 13 degrees with abducted hallux  Long 2nd toe   Flexion PIPJ and DIPJ toes 2 and 3; flexion PIPJ 4th toe      Data Review:   No results found for this or any previous visit (from the past 24 hour(s)).       Assessment:     Principal Problem:    Hallux valgus of right foot (4/18/2022)      Overview: DX: hallux valgus right foot Hammertoe right toes 2,3,4        Plan:   Scheduled for bunionectomy right foot with hammertoe correction right toes 2,3,4; cortisone injection left heel. Discussed risks, benefits, expected outcome as well as post op course.

## 2022-04-18 NOTE — BRIEF OP NOTE
Brief Postoperative Note    Patient: Michelle Glez  YOB: 1960  MRN: [id number]    Date of Procedure: 4/18/2022     Pre-Op Diagnosis: HALLUX VALGUS RIGHT FOOT, HAMMERTOE RIGHT TOES 2,3 AND 4; plantar fasciitis left foot    Post-Op Diagnosis: Same as preoperative diagnosis. Procedure(s):  BUNIONECTOMY RIGHT FOOT, HAMMERTOE CORRECTION RIGHT TOES SECOND, THIRD AND FOURTH; CORITSONE INJECTION LEFT HEEL    Surgeon(s):  Camron Landa DPM    Surgical Assistant: None    Anesthesia: MAC     Estimated Blood Loss (mL): Minimal    Complications: None    Specimens: * No specimens in log *     Implants:   Implant Name Type Inv.  Item Serial No.  Lot No. LRB No. Used Action   SCREW BNE LAG 2.4X16 MM CARNIO MXLFCL HPS - SN/A  SCREW BNE LAG 2.4X16 MM CARNIO MXLFCL HPS N/A [employer] N/A Right 2 Implanted   IMPL KT [id number] -- DYNAFORCE - SN/A  IMPL KT [id number] -- DYNAFORCE N/A [employer] EXTREM[employer]_WD [id number] Right 1 Implanted       Drains: * No LDAs found *    Findings: enlarged bone    Electronically Signed by Dina Cantor DPM on 4/18/2022 at 12:36 PM Chief Complaint   Patient presents with   • Urinary Catheter Insertion Or Check   • Fever 75 years or more     History Of Present Illness  Edmond is a 70 year old male, patient of Mila Pillai DO, with a past medical history of spondylolisthesis of lumbar region s/p lumbar spinal fusion, Rib Fracture, and probable dementia who presents to the ED with the chief complaint of urinary catheter insertion onset 5/20 when the patent pulled out sparks and began to bleed.    Patient was admitted to the hospital On May 17.  He presented with a mechanical fall with rib fracture and acute on chronic back pain.  At that time he had been evaluated by orthopedics and discharged to rehab.  He also had urinary retention was discharged with Sparks catheter with plans to do voiding trial at rehab.  Patient at rehab pulled out his Sparks and had hematuria associated with it.  Patient in the hospital did have episode of confusion and this was felt to be secondary to morphine yesterday at discharge he was doing better and appeared to be at baseline.    Upon arrival to the ED, the patient was afebrile with , RR 20, SpO2 96, and /96. Lab workup was significant for Glucose 137, BUN 22, and Albumin 3.4. CXR was unremarkable. A CT A/P showed sparks catheter balloon is not located within the urinary bladder.  The Sparks catheter balloon appears oblong shaped and appears to be within the base of the penis. There is a moderate amount of gas throughout the perineum and base of the penis extending to the base of the prostate gland and even near the anterior aspect of the anus. Suspected cyst right kidney. Nephrolithiasis of the left kidney.  No obstructive uropathy. The patient was admitted under observation for further workup and management.       Past Medical History  Past Medical History:   Diagnosis Date   • Compression of spinal cord with myelopathy (CMS/Formerly [facility])    • Fracture 2006    R rib   • Spondylolisthesis    • Thoracic spinal  stenosis         Surgical History  Past Surgical History:   Procedure Laterality Date   • Lumbar fusion          Social History  Social History     Tobacco Use   • Smoking status: Former Smoker     Types: Cigarettes     Start date: 9/16/1966     Last attempt to quit: 7/1/2016     Years since quitting: 3.8   • Smokeless tobacco: Never Used   • Tobacco comment: Smokes seldom.   Substance Use Topics   • Drug use: No       Family History  Family History   Problem Relation Age of Onset   • Heart disease Father         Allergies  ALLERGIES: no known allergies.    Medications  (Not in a hospital admission)      Review of Systems  Review of Systems   All other systems reviewed and are negative.        Last Recorded Vitals  Blood pressure 106/80, pulse 98, temperature 98.4 °F (36.9 °C), resp. rate (!) 11, height 6' (1.829 m), weight 76.5 kg (168 lb 10.4 oz), SpO2 97 %.    Physical Exam  HENT:      Head: Normocephalic and atraumatic.      Mouth/Throat:      Mouth: Mucous membranes are moist.   Eyes:      Conjunctiva/sclera: Conjunctivae normal.   Neck:      Musculoskeletal: Normal range of motion and neck supple.   Cardiovascular:      Rate and Rhythm: Normal rate and regular rhythm.   Pulmonary:      Effort: No respiratory distress.      Breath sounds: Normal breath sounds. No wheezing.   Abdominal:      General: Abdomen is flat. Bowel sounds are normal. There is no distension.      Palpations: Abdomen is soft.      Tenderness: There is no abdominal tenderness.   Neurological:      Mental Status: He is alert. He is disoriented.   Psychiatric:         Mood and Affect: Mood normal.         Behavior: Behavior normal.          Labs     Labs:  Recent Labs   Lab 05/21/20  0410 05/19/20  1328 05/17/20  1310   WBC 6.8 6.6 9.6   HGB 14.7 15.4 15.5   HCT 44.2 46.1 46.8    187 190     Recent Labs   Lab 05/21/20  0410 05/19/20  1328 05/17/20  1310   SODIUM 138 141 142   POTASSIUM 4.0 3.9 4.2   CHLORIDE 105 104 107   CO2 26 28 24    BUN 22* 20 21*   CREATININE 0.74 0.78 0.75   ANIONGAP 11 13 15   CALCIUM 8.8 8.3* 9.1   ALBUMIN 3.4* 3.5* 4.0   BILIRUBIN 0.7 1.5* 1.7*   ALKPT 87 89 97   AST 34 50* 17   GLUCOSE 137* 142* 111*   MG 1.9  --   --    LACTA 1.4  --   --    PT 10.0  --   --    PTT 29  --   --    INR 1.0  --   --          MICROBIOLOGY  No results found for: SDES No results found for: CULT             Imaging  CT ABDOMEN PELVIS WO CONTRAST   Final Result    IMPRESSION:   Lewis catheter balloon is not located within the urinary bladder.  The Lewis catheter balloon appears oblong shaped and appears to be within the base of the penis.  There is a moderate amount of gas throughout the perineum and base of the penis extending    to the base of the prostate gland and even near the anterior aspect of the anus.  Traumatic injury must be considered.  There is a little bit of gas and fluid within the suspected balloon.  It is presumed that some of this gas may have extended from the    balloon of the Lewis and/or traumatic injury.  Infectious component not excluded.  Clinical correlation is advised.  Urethral injury should be considered.      Moderate thickening of the urinary bladder wall.  Some gas within the urinary bladder wall is noted.  Clinical correlation and direct visualization is advised after acute symptoms resolve.      Suspected cyst right kidney.  Nephrolithiasis of the left kidney.  No obstructive uropathy.      Hypodensity adjacent to the gallbladder fossa is nonspecific and may represent cyst or hemangioma.      Small ovoid fluid collection seen inferior to the left hip joint extending medially.  This could be some loculated joint fluid.  Degenerative changes left hip joint are noted.      Dr. Moseley notified of findings by Dr. Morales of radiology at 7:48 AM on 05/21/2020 via telephone.      Electronically Signed by: OLGA MORALES D.O.    Signed on: 5/21/2020 7:55 AM          XR CHEST PA OR AP 1 VIEW   Final Result    Overlying  EKG leads wires are present.  Normal contours the cardiomediastinal silhouette.  No large airspace opacity, pleural effusion or pneumothorax identified.  Post surgical changes of the upper thoracic spine and right shoulder are noted.      Electronically Signed by: OLGA WOODRUFF D.O.    Signed on: 5/21/2020 6:14 AM              ASSESSMENT AND PLAN    Gross Hematuria secondary to urethral trauma from patient pulling out Sparks catheter  - Pt pulled out sparks  - CT A/P (5/21) as above  - Pain control PRN  -Discussed with urology service in detail Dr. Dave    AMS with acute metabolic encephalopathy-etiology is unclear  -Could be related to pain medications, versus underlying infection  -Empirically start IV Rocephin given low-grade temperature  -Check UA  -Blood cultures are pending  -Chest x-ray was reviewed  -CT abdomen pelvis reviewed  -Neurology consulted-Dr. Ramos notified via perfect serve  Head CT done on previous admission reviewed-    Mechanical fall with rib fx and acute on chronic back pain  - XR Ribs done during previous admission c/w above  - Pain control PRN     Acute on chronic back pain with SS thoracic spine, HNP at T2-3  Myofascial pain of Lumbar spine. Myelopathy, s/p T2-3 Thoracic Laminectomy and posterior fusion 9/2019 by JOSIE Tilley 9/2019  - Pain control PRN        Code Status    Code Status: Prior    DVT PPx: SCDs, will hold off on chemical ppx given hematuria on presentation.    DISPOSITION: Pending hospital course    PCP:  Mila Pillai DO updated via epic chat    Wife was contacted and discussed with her in detail.      Charting performed by christopher Gallagher for Dr. Nano Sparks    All medical record entries made by the christopher were at my direction. I have reviewed the chart and agree that the record accurately reflects my personal performance of the history, physical exam, hospital course, and assessment and plan.

## 2022-04-18 NOTE — PERIOP NOTES
Handoff Report from Operating Room to PACU    Report received from Dr. Wendy Smith and Mariely Brannon regarding Dolores Host. Surgeon(s):  Abimael Potter DPM  And Procedure(s) (LRB):  BUNIONECTOMY RIGHT FOOT, HAMMERTOE CORRECTION RIGHT TOES SECOND, THIRD AND FOURTH; CORITSONE INJECTION LEFT HEEL (Bilateral)  confirmed   with allergies discussed. Anesthesia type, drugs, patient history, complications, estimated blood loss, vital signs, intake and output, and lines and temperature were reviewed.

## 2022-04-18 NOTE — BRIEF OP NOTE
Brief Postoperative Note    Patient: Shivam Mark  YOB: 1960  MRN: [id number]    Date of Procedure: 4/18/2022     Pre-Op Diagnosis: HALLUX VALGUS RIGHT FOOT, HAMMERTOE RIGHT TOES 2,3 AND 4, PLANTAR FASCIITIS    Post-Op Diagnosis: Same as preoperative diagnosis. Procedure(s):  BUNIONECTOMY RIGHT FOOT, HAMMERTOE CORRECTION RIGHT TOES SECOND, THIRD AND FOURTH; CORITSONE INJECTION LEFT HEEL    Surgeon(s):  Mercedes Almazan DPM    Surgical Assistant: None    Anesthesia: MAC     Estimated Blood Loss (mL): Minimal    Complications: None    Specimens: * No specimens in log *     Implants:   Implant Name Type Inv.  Item Serial No.  Lot No. LRB No. Used Action   SCREW BNE LAG 2.4X16 MM CARNIO MXLFCL HPS - SN/A  SCREW BNE LAG 2.4X16 MM CARNIO MXLFCL HPS N/A [employer] N/A Right 2 Implanted   IMPL KT [id number] -- DYNAFORCE - SN/A  IMPL KT [id number] -- DYNAFORCE N/A [employer] EXTREM[employer]_WD [id number] Right 1 Implanted   WIRE FIX L5IN [id number] DBL END TRCR 50/EA - [id number]  WIRE FIX L5IN [id number] DBL END TRCR 50/EA  [employer]WD   4 Implanted   WIRE TEMP FIX 15 MM DIAM S STL DBL END SMOOTH DBL SHRP TIP - [id number]  WIRE TEMP FIX 15 MM DIAM S STL DBL END SMOOTH DBL SHRP TIP  [employer]WD   1 Implanted       Drains: * No LDAs found *    Findings: enlarged bone    Electronically Signed by Cooper Carlson DPM on 4/18/2022 at 2:13 PM

## 2022-04-18 NOTE — DISCHARGE INSTRUCTIONS
DISCHARGE SUMMARY from Nurse    PATIENT INSTRUCTIONS:    After general anesthesia or intravenous sedation, for 24 hours or while taking prescription Narcotics:  · Limit your activities  · Do not drive and operate hazardous machinery  · Do not make important personal or business decisions  · Do  not drink alcoholic beverages  · If you have not urinated within 8 hours after discharge, please contact your surgeon on call. Report the following to your surgeon:  · Excessive pain, swelling, redness or odor of or around the surgical area  · Temperature over 100.5  · Nausea and vomiting lasting longer than 4 hours or if unable to take medications  · Any signs of decreased circulation or nerve impairment to extremity: change in color, persistent  numbness, tingling, coldness or increase pain  · Any questions    These are general instructions for a healthy lifestyle:    No smoking/ No tobacco products/ Avoid exposure to second hand smoke  Surgeon General's Warning:  Quitting smoking now greatly reduces serious risk to your health. Obesity, smoking, and sedentary lifestyle greatly increases your risk for illness    A healthy diet, regular physical exercise & weight monitoring are important for maintaining a healthy lifestyle    You may be retaining fluid if you have a history of heart failure or if you experience any of the following symptoms:  Weight gain of 3 pounds or more overnight or 5 pounds in a week, increased swelling in our hands or feet or shortness of breath while lying flat in bed. Please call your doctor as soon as you notice any of these symptoms; do not wait until your next office visit. The discharge information has been reviewed with the {PATIENT PARENT GUARDIAN:45108}. The {PATIENT PARENT GUARDIAN:67399} verbalized understanding.   Discharge medications reviewed with the {Dishcarge meds reviewed DBGO:38495} and appropriate educational materials and side effects teaching were provided. _____Patient Education        Learning About COVID-19 and Flu Symptoms  How can you tell COVID-19 from the flu? COVID-19 and the flu have similar symptoms. The two can be hard to tell apart. The only way to know for sure which illness you have is to be tested. If you have questions about COVID-19 testing, ask your doctor or go to cdc.gov to use the COVID-19 Viral Testing Tool. Since the symptoms are so alike, it makes sense to act as if you have COVID-19 until your test results come back. This means staying home and limiting contact with people in your home. You'll need to wash your hands often and disinfect surfaces that you touch. And be sure to wear a mask when you're around other people. This is also good advice if you think you have the flu. COVID-19 and the flu have these symptoms in common:  · Fever or chills  · Cough  · Shortness of breath  · Fatigue (tiredness)  · Sore throat  · Runny or stuffy nose  · Muscle and body aches  · Headache  · Vomiting and diarrhea (more common in children than adults)  COVID-19 has another symptom that also may occur:  · New loss of taste or smell  COVID-19 symptoms may appear from 2 to 14 days after infection. Flu symptoms usually appear 1 to 4 days after infection. Why should you get a flu shot during the COVID-19 pandemic? It's important to get your yearly flu vaccine. Both the flu and COVID-19 can be active at the same time. You can get sick with both infections at once. And having both may make you more sick than getting just one. The flu vaccine won't protect you from COVID-19. But it can help prevent the flu or reduce its symptoms. If fewer people get very ill with the flu, this will help free up medical resources that are needed for COVID-19 patients. Where can you learn more? Go to http://www.[employer] [facility].[email or web address]/  Enter C123 in the search box to learn more about \"Learning About COVID-19 and Flu Symptoms. \"  Current as of: March 26, 2021               Content Version: 13.2  © 2006-2022 [employer]. Care instructions adapted under license by [employer] (which disclaims liability or warranty for this information). If you have questions about a medical condition or this instruction, always ask your healthcare professional. [address] any warranty or liability for your use of this information.        ______________________________________________________________________________________________________________________________

## 2022-04-18 NOTE — ANESTHESIA PREPROCEDURE EVALUATION
Relevant Problems   CARDIOVASCULAR   (+) Essential hypertension       Anesthetic History   No history of anesthetic complications            Review of Systems / Medical History  Patient summary reviewed and pertinent labs reviewed    Pulmonary            Asthma        Neuro/Psych   Within defined limits           Cardiovascular    Hypertension              Exercise tolerance: >4 METS     GI/Hepatic/Renal     GERD           Endo/Other        Obesity     Other Findings              Physical Exam    Airway  Mallampati: II  TM Distance: 4 - 6 cm  Neck ROM: normal range of motion   Mouth opening: Normal     Cardiovascular    Rhythm: regular  Rate: normal         Dental    Dentition: Upper dentition intact, Lower dentition intact and Upper partial plate     Pulmonary  Breath sounds clear to auscultation               Abdominal  GI exam deferred       Other Findings            Anesthetic Plan    ASA: 2  Anesthesia type: MAC            Anesthetic plan and risks discussed with: Patient

## 2022-04-18 NOTE — PERIOP NOTES
Patient meets discharge criteria. Patient and wife [place] provided with verbal and written discharge instructions. Patient discharged by wheelchair with wife Anisa to transport home.

## 2022-04-19 LAB
BACTERIA SPEC CULT: ABNORMAL
SERVICE CMNT-IMP: ABNORMAL

## 2022-04-19 NOTE — OP NOTES
[address]  OPERATIVE REPORT    Name:  Syeda Allen  MR#:  [id number]  :  1960  ACCOUNT #:  [de-identified]  DATE OF SERVICE:  2022    PREOPERATIVE DIAGNOSES:  Hallux valgus deformity, right foot; hammertoe deformities, toes two, three and four, right foot; plantar fasciitis, left foot. POSTOPERATIVE DIAGNOSES:  Hallux valgus deformity, right foot; hammertoe deformities, toes two, three and four, right foot; plantar fasciitis, left foot. PROCEDURE PERFORMED:  Michael-Remberto bunionectomy, right foot; arthroplasty, toes two and three, right foot; tenotomy and capsulotomy, fourth right toe; cortisone injection, medial aspect, left heel. SURGEON:  Elayne Hernandez DPM    ASSISTANT:  none    ANESTHESIA:  IV sedation with local.    COMPLICATIONS:  none. SPECIMENS REMOVED:  None. IMPLANTS:  Screw and staple fixation    ESTIMATED BLOOD LOSS:  Minimal.    INDICATIONS:  This 72-year-old black male presents with painful toes on his right foot. Most of conservative therapy has failed to alleviate the patient of his symptoms. At this time, surgical intervention has been opted as the treatment of choice by the patient. Medical history and physical has been performed. The patient released to surgery. Physical examination revealed palpable pedal pulses, fairly good muscle strength lower extremities bilateral with epicritic sensation intact. He has an abductor hallux, right foot that is painful medially and with range of motion. Contracted digits two, three and four, right foot with a painful dorsal hyperkeratosis PIPJ second right toe. X-rays taken reveal a first intermetatarsal (IM) angle of 13 degrees with an abduction of the hallux, flexion deformity at the PIPJ and DIPJ, toes two and three; flexion deformity at the PIPJ, fourth right toe. DESCRIPTION OF PROCEDURE:  The patient was brought into the operating room, placed on the operative table in supine position.   Under the influence of IV sedation, anesthesia was achieved to the right foot using 0.05% Marcaine plain. Then, 1 mL of dexamethasone, 1 mL of Solu-Medrol and 3 mL of 0.5% Marcaine plain was infused into the medial aspect of the left heel. Right foot was now prepped and draped in the usual sterile manner and a successful time-out was completed. Next, a pneumatic ankle tourniquet was inflated to 250 mmHg. Using a #15 blade, a dorsomedial incision was made centered over the first right MPJ. A lateral release was performed at the MPJ. Next, the incision was carried down to the bone. All capsular and periosteal structures were reflected. Using a sagittal saw, the medial eminence of first metatarsal head was resected and the cuts were made for a Chevron-type osteotomy. The osteotomy was shifted laterally into the desired position and permanently fixated using two, 2.4 in diameter, 16 mm in length screws. All medial overhanging margins of the metatarsal shaft was resected. Next, the cuts were made using a sagittal saw, the cuts were made in the first proximal phalanx for an Akin osteotomy which was fixated using a 12 mm staple. At this time, the digit appeared to be in proper anatomical alignment. All capsular and superficial fascia were reapproximated using 4-0 Vicryl in a running stitch manner and all skin edges were reapproximated using 5-0 Monocryl in a subcuticular manner. Next, using a #15 blade, a dorsal ellipse of skin was resected centered over the PIPJ of toes two and three, right foot. Using a #15 blade, a transverse incision was made excising the extensor tendon at the PIPJ of the second and third toes and DIPJ of the third toe. Using a sagittal saw, the head of the proximal phalanx, second and third right toes were resected and the head of the middle phalanx of the third right toe. Next, both digits were fixated in a rectus position using a 0.045 K-wire.   A plastic protective pin cap was placed on the distal exit site. All extensor tendons were now reapproximated using 4-0 Vicryl in a running stitch manner and all skin edges were reapproximated using 5-0 nylon with horizontal mattress sutures. Attention was now directed to the fourth toe. Using a #15 blade, a percutaneous tenotomy and capsulotomy was performed at the MPJ followed by a tenotomy from the lateral aspect of the fourth PIPJ tenotomizing the flexor tendon. All incisions on the fourth toe were reapproximated using 5-0 nylon with simple interrupted sutures. At this time, all digits appeared to be in proper anatomical alignment with adequate reduction of the deformities. Betadine-soaked Adaptic followed by a sterile compressive dressing was now placed on the right foot. Pneumatic ankle tourniquet was released and good color return was noted to all digits, one through five, right foot. Preoperatively, he was given 2 g of Ancef IV.       Carmela Reyes DPM      [id number]  D:  04/18/2022 14:09  T:  04/19/2022 2:00  JOB #:  [id number]

## 2022-04-25 PROBLEM — A63.0 GENITAL CONDYLOMA, MALE: Status: ACTIVE | Noted: 2022-04-25

## 2022-05-16 DIAGNOSIS — L81.0 POST-INFLAMMATORY HYPERPIGMENTATION: ICD-10-CM

## 2022-05-16 RX ORDER — TRIAMCINOLONE ACETONIDE 1 MG/G
OINTMENT TOPICAL 2 TIMES DAILY
Qty: 30 G | Refills: 0 | Status: SHIPPED | OUTPATIENT
Start: 2022-05-16 | End: 2022-07-18 | Stop reason: SDUPTHER

## 2022-05-31 NOTE — ED NOTES
Patient  given copy of dc instructions and 3 script(s). Patient  verbalized understanding of instructions and script (s). Patient given a current medication reconciliation form and verbalized understanding of their medications. Patient verbalized understanding of the importance of discussing medications with  his or her physician or clinic they will be following up with. Patient alert and oriented and in no acute distress. Patient discharged home ambulator. [General Appearance - Alert] : alert [General Appearance - In No Acute Distress] : in no acute distress [General Appearance - Well Nourished] : well nourished [General Appearance - Well Developed] : well developed [Sclera] : the sclera and conjunctiva were normal [] : no rash [Motor Exam] : the motor exam was normal [FreeTextEntry1] : There is minimal right GH joint tenderness without palpable swelling. There is minimal pain on extreme ROM of the right hip. There is no other evidence of active synovitis throughout all ther joints examined.

## 2022-07-18 ENCOUNTER — VIRTUAL VISIT (OUTPATIENT)
Dept: INTERNAL MEDICINE CLINIC | Age: 62
End: 2022-07-18

## 2022-07-18 ENCOUNTER — VIRTUAL VISIT (OUTPATIENT)
Dept: INTERNAL MEDICINE CLINIC | Age: 62
End: 2022-07-18
Payer: COMMERCIAL

## 2022-07-18 DIAGNOSIS — L81.0 POST-INFLAMMATORY HYPERPIGMENTATION: ICD-10-CM

## 2022-07-18 DIAGNOSIS — J06.9 VIRAL UPPER RESPIRATORY TRACT INFECTION: Primary | ICD-10-CM

## 2022-07-18 PROCEDURE — 99214 OFFICE O/P EST MOD 30 MIN: CPT | Performed by: INTERNAL MEDICINE

## 2022-07-18 RX ORDER — TRIAMCINOLONE ACETONIDE 1 MG/G
OINTMENT TOPICAL 2 TIMES DAILY
Qty: 60 G | Refills: 1 | Status: SHIPPED | OUTPATIENT
Start: 2022-07-18

## 2022-07-18 RX ORDER — AZITHROMYCIN 250 MG/1
TABLET, FILM COATED ORAL
Qty: 6 TABLET | Refills: 0 | Status: SHIPPED | OUTPATIENT
Start: 2022-07-18 | End: 2022-10-18

## 2022-07-18 RX ORDER — IBUPROFEN 200 MG
600 TABLET ORAL
Qty: 90 TABLET | Refills: 1 | Status: CANCELLED | OUTPATIENT
Start: 2022-07-18

## 2022-07-18 NOTE — PROGRESS NOTES
Chief Complaint   Patient presents with    Cough     x 2 weeks, pt reports cough, congestion- yellow mucous, and sore throat, pt states covid test was negative, has tried DayQuil w/ no relief     Request For New Medication     pt states triamcinolone ointment is not strong enough and would like a stronger dose      Pt denies pain at this time     1. \"Have you been to the ER, urgent care clinic since your last visit? Hospitalized since your last visit? \" No    2. \"Have you seen or consulted any other health care providers outside of the [address] [place] [address] since your last visit? \" No     3. For patients aged 39-70: Has the patient had a colonoscopy / FIT/ Cologuard? No      If the patient is female:    4. For patients aged 41-77: Has the patient had a mammogram within the past 2 years? NA - based on age or sex      11. For patients aged 21-65: Has the patient had a pap smear?  NA - based on age or sex

## 2022-07-18 NOTE — PROGRESS NOTES
Susan Lentz is a 63 y.o. male who was seen by synchronous (real-time) audio-video technology on 7/18/2022 for Cough (x 2 weeks, pt reports cough, congestion- yellow mucous, and sore throat, pt states covid test was negative, has tried DayQuil w/ no relief ) and Request For New Medication (pt states triamcinolone ointment is not strong enough and would like a stronger dose )        Assessment & Plan:   1. Viral upper respiratory tract infection  Rest/increase PO fluids/otc antipyretics prn  F/u if symptoms >10 days or worsening  Go to ED if ACUTELY worsen  Recommend pt covid test ASAP  - azithromycin (ZITHROMAX) 250 mg tablet; Take 2 tablets today, then take 1 tablet daily  Dispense: 6 Tablet; Refill: 0    2. Post-inflammatory hyperpigmentation    - triamcinolone acetonide (KENALOG) 0.1 % ointment; Apply  to affected area two (2) times a day. use thin layer to leg area prn  Dispense: 60 g; Refill: 1    Subjective:     Upper respiratory infection Review:  Susan Lentz is a 63 y.o. male who complains of nasal congestion,sore throat, productive cough, myalgias  for the past 2 weeks. He has NOT taken a covid test in the last 2 weeks- he states he took a test 2 months ago. He denies a history of shortness of breath. Evaluation to date: none. Treatment to date: OTC products. Relevant PMH: No pertinent additional PMH. PMH:   HTN-on lotrel 10/20  BP Readings from Last 3 Encounters:   04/06/22 128/81   01/05/22 138/87   11/27/21 (!) 160/105      Covid infection and pneumonia 3/2021    Prior to Admission medications    Medication Sig Start Date End Date Taking? Authorizing Provider   triamcinolone acetonide (KENALOG) 0.1 % ointment Apply  to affected area two (2) times a day. use thin layer to leg area prn 5/16/22   Joseline Pavon MD   ibuprofen (MOTRIN) 200 mg tablet Take 3 Tablets by mouth three (3) times daily as needed for Pain.  4/18/22   Otto Braun DPM   amLODIPine-benazepril (LOTREL) 10-20 mg per capsule TAKE 1 CAPSULE BY MOUTH DAILY 4/11/22   Summer Acevedo NP   omeprazole (PRILOSEC) 20 mg capsule Take 1 Capsule by mouth daily. 11/12/21   Alexandria Floyd MD   albuterol (PROVENTIL HFA, VENTOLIN HFA, PROAIR HFA) 90 mcg/actuation inhaler Take 1 Puff by inhalation every six (6) hours as needed for Wheezing. 7/30/21   Alexandria Floyd MD         Objective:   No flowsheet data found. General: alert, cooperative, no distress   Mental  status: normal mood, behavior, speech, dress, motor activity, and thought processes, able to follow commands   HENT: NCAT   Neck: no visualized mass   Resp: no respiratory distress   Neuro: no gross deficits   Skin: no discoloration or lesions of concern on visible areas   Psychiatric: normal affect, consistent with stated mood, no evidence of hallucinations     Additional exam findings: We discussed the expected course, resolution and complications of the diagnosis(es) in detail. Medication risks, benefits, costs, interactions, and alternatives were discussed as indicated. I advised him to contact the office if his condition worsens, changes or fails to improve as anticipated. He expressed understanding with the diagnosis(es) and plan. Pankaj Bradenalpesh, was evaluated through a synchronous (real-time) audio-video encounter. The patient (or guardian if applicable) is aware that this is a billable service, which includes applicable co-pays. This Virtual Visit was conducted with patient's (and/or legal guardian's) consent. The visit was conducted pursuant to the emergency declaration under the [facility], [address] [place] [address] authority and the [employer] General Act. Patient identification was verified, and a caregiver was present when appropriate.   The patient was located at: Home: [address], [address]  The provider was located at: Home:         Hina MAC Live Alicea MD

## 2022-07-18 NOTE — PROGRESS NOTES
Chief Complaint   Patient presents with    Cough     x 2 weeks, pt reports cough, congestion- yellow mucous, and sore throat, pt states covid test was negative, has tried DayQuil w/ no relief     Request For New Medication     pt states triamcinolone ointment is not strong enough and would like a stronger dose        1. \"Have you been to the ER, urgent care clinic since your last visit? Hospitalized since your last visit? \" No    2. \"Have you seen or consulted any other health care providers outside of the [address] [place] [address] since your last visit? \" No     3. For patients aged 39-70: Has the patient had a colonoscopy / FIT/ Cologuard? No      If the patient is female:    4. For patients aged 41-77: Has the patient had a mammogram within the past 2 years? NA - based on age or sex      11. For patients aged 21-65: Has the patient had a pap smear?  NA - based on age or sex

## 2022-10-17 ENCOUNTER — NURSE TRIAGE (OUTPATIENT)
Dept: OTHER | Facility: CLINIC | Age: 62
End: 2022-10-17

## 2022-10-17 NOTE — TELEPHONE ENCOUNTER
Location of patient: [place]    Received call from Tyrel Lewis at [facility] with [employer]. Subjective: Caller states \"boil on left calf 3-4 days, painful to touch, it burst and drained but now having swelling and infected\"     Appt set for Thursday. Current Symptoms: Boil approx the size of a nickel that burst with white pus, is now very red, tenderness to touch, swelling from side of calf all the way down to ankle    Onset: 4 days ago; gradual    Associated Symptoms: NA    Pain Severity: 7/10; aching; constant    Temperature: Denies     What has been tried: draining it    LMP: NA Pregnant: NA    Recommended disposition: Go to Office Now    Care advice provided, patient verbalizes understanding; denies any other questions or concerns; instructed to call back for any new or worsening symptoms. Attempted to connect to [place] ([facility]) member for scheduling, extended hold times. Sent message to [place] ([facility]) chat with patient information and scheduling recommendation to reach out to patient for appointment scheduling. Attention Provider: Thank you for allowing me to participate in the care of your patient. The patient was connected to triage in response to information provided to the [facility]. Please do not respond through this encounter as the response is not directed to a shared pool.   Reason for Disposition   SEVERE pain (e.g., excruciating)    Protocols used: Boil (Skin Abscess)-ADULT-[place]

## 2022-10-18 ENCOUNTER — OFFICE VISIT (OUTPATIENT)
Dept: INTERNAL MEDICINE CLINIC | Age: 62
End: 2022-10-18
Payer: COMMERCIAL

## 2022-10-18 VITALS
HEIGHT: 72 IN | OXYGEN SATURATION: 97 % | BODY MASS INDEX: 34.13 KG/M2 | WEIGHT: 252 LBS | RESPIRATION RATE: 18 BRPM | DIASTOLIC BLOOD PRESSURE: 86 MMHG | TEMPERATURE: 98.1 F | SYSTOLIC BLOOD PRESSURE: 130 MMHG | HEART RATE: 87 BPM

## 2022-10-18 DIAGNOSIS — L03.116 CELLULITIS OF LEFT LOWER EXTREMITY: Primary | ICD-10-CM

## 2022-10-18 DIAGNOSIS — I10 ESSENTIAL HYPERTENSION: ICD-10-CM

## 2022-10-18 PROCEDURE — 99214 OFFICE O/P EST MOD 30 MIN: CPT | Performed by: INTERNAL MEDICINE

## 2022-10-18 RX ORDER — IBUPROFEN 600 MG/1
600 TABLET ORAL
Qty: 60 TABLET | Refills: 0 | Status: SHIPPED | OUTPATIENT
Start: 2022-10-18

## 2022-10-18 RX ORDER — SULFAMETHOXAZOLE AND TRIMETHOPRIM 800; 160 MG/1; MG/1
1 TABLET ORAL 2 TIMES DAILY
Qty: 28 TABLET | Refills: 0 | Status: SHIPPED | OUTPATIENT
Start: 2022-10-18

## 2022-10-18 RX ORDER — AMLODIPINE AND BENAZEPRIL HYDROCHLORIDE 10; 20 MG/1; MG/1
1 CAPSULE ORAL DAILY
Qty: 90 CAPSULE | Refills: 0 | Status: SHIPPED | OUTPATIENT
Start: 2022-10-18

## 2022-10-18 NOTE — PROGRESS NOTES
Chief Complaint   Patient presents with    Cyst     Boil on  left calf x 4-5 days, burst and drained, is now having swelling from calf to ankle and is red, pt is concerned for infection        1. \"Have you been to the ER, urgent care clinic since your last visit? Hospitalized since your last visit? \" No    2. \"Have you seen or consulted any other health care providers outside of the [address] [place] [address] since your last visit? \" No     3. For patients aged 39-70: Has the patient had a colonoscopy / FIT/ Cologuard? No      If the patient is female:    4. For patients aged 41-77: Has the patient had a mammogram within the past 2 years? NA - based on age or sex      11. For patients aged 21-65: Has the patient had a pap smear?  NA - based on age or sex

## 2022-10-18 NOTE — PROGRESS NOTES
Simon King is a 63 y.o. male and presents with Cyst (Boil on  left calf x 4-5 days, burst and drained, is now having swelling from calf to ankle and is red, pt is concerned for infection )  . Subjective:    Pt w c/o left lateral calf \"pimple\" that he \"popped\" ~5 days ago. It subsequently became red/swollen and painful. PMH:   HTN-on lotrel 10/20  BP Readings from Last 3 Encounters:   10/18/22 130/86   22 (!) 126/90   22 128/81      Covid infection and pneumonia 3/2021    PSH- appendectomy    SH-    No tob occas no heroin/cocaine    FH- father  ? ? Mother  80 natural causes   8 siblings 1 sister  aneurysm, 1 brother ESRD due to drug use    HM    Colonoscopy < 10 yrs ago  Immunizations  Eye care  Dental care        Review of Systems  Review of systems (12) negative, except noted above. Past Medical History:   Diagnosis Date    COVID-19 2021    Gastrointestinal disorder     GERDS    GERD (gastroesophageal reflux disease)     Hypertension     Ill-defined condition     MRSA     Past Surgical History:   Procedure Laterality Date    HX APPENDECTOMY      HX HEENT      right eye surgery    HX OTHER SURGICAL      right eye surgery    HX UROLOGICAL      laser surgery on \"my private parts\"     Social History     Socioeconomic History    Marital status:    Tobacco Use    Smoking status: Never    Smokeless tobacco: Never   Vaping Use    Vaping Use: Never used   Substance and Sexual Activity    Alcohol use: Yes     Comment: occasional    Drug use: Yes     Types: Marijuana     Comment: occasional    Sexual activity: Yes     No family history on file. Current Outpatient Medications   Medication Sig Dispense Refill    ibuprofen (MOTRIN) 600 mg tablet Take 1 Tablet by mouth three (3) times daily as needed for Pain. 60 Tablet 0    amLODIPine-benazepril (LOTREL) 10-20 mg per capsule Take 1 Capsule by mouth daily.  90 Capsule 0    trimethoprim-sulfamethoxazole (BACTRIM DS, SEPTRA DS) 160-800 mg per tablet Take 1 Tablet by mouth two (2) times a day. 28 Tablet 0    triamcinolone acetonide (KENALOG) 0.1 % ointment Apply  to affected area two (2) times a day. use thin layer to leg area prn 60 g 1    omeprazole (PRILOSEC) 20 mg capsule Take 1 Capsule by mouth daily. 30 Capsule 2    albuterol (PROVENTIL HFA, VENTOLIN HFA, PROAIR HFA) 90 mcg/actuation inhaler Take 1 Puff by inhalation every six (6) hours as needed for Wheezing. (Patient not taking: Reported on 10/18/2022) 1 Inhaler 5     No Known Allergies    Objective:  Visit Vitals  /86 (BP 1 Location: Left upper arm, BP Patient Position: Sitting, BP Cuff Size: Adult)   Pulse 87   Temp 98.1 °F (36.7 °C) (Temporal)   Resp 18   Ht 6' (1.829 m)   Wt 252 lb (114.3 kg)   SpO2 97%   BMI 34.18 kg/m²     Physical Exam:   General appearance - alert, well appearing, and in no distress  Mental status - alert, oriented to person, place, and time  EYE-EOMI  Neck - supple, no significant adenopathy   Chest - clear to auscultation, n+ tenderness left flank area over lower rib  Heart - normal rate, regular rhythm, normal S1, S2  Abdomen - soft, nontender, obese   Ext-peripheral pulses normal, no pedal edema, no clubbing or cyanosis  Skin-Warm and dry. Left lateral calf w round, scabbed lesion ~ 1 cm diameter w large periphery of redness and induration  Neuro -alert, oriented, normal speech, no focal findings or movement disorder noted      Results for orders placed or performed during the hospital encounter of 04/18/22   CULTURE, MRSA    Specimen: Nares; Nasal   Result Value Ref Range    Special Requests: NO SPECIAL REQUESTS      Culture result: MRSA PRESENT (A)      Culture result:        Screening of patient nares for MRSA is for surveillance purposes and, if positive, to facilitate isolation considerations in high risk settings.  It is not intended for automatic decolonization interventions per se as regimens are not sufficiently effective to warrant routine use. Culture result:       (NOTE) MRSA CALLED TO AND READ BACK BY DR Linder Mail 4.19.22 AT 10:10, [id number]       Assessment/Plan:    ICD-10-CM ICD-9-CM    1. Cellulitis of left lower extremity  L03.116 682.6 trimethoprim-sulfamethoxazole (BACTRIM DS, SEPTRA DS) 160-800 mg per tablet      2. Essential hypertension  I10 401.9 amLODIPine-benazepril (LOTREL) 10-20 mg per capsule        Orders Placed This Encounter    ibuprofen (MOTRIN) 600 mg tablet     Sig: Take 1 Tablet by mouth three (3) times daily as needed for Pain. Dispense:  60 Tablet     Refill:  0    amLODIPine-benazepril (LOTREL) 10-20 mg per capsule     Sig: Take 1 Capsule by mouth daily. Dispense:  90 Capsule     Refill:  0    trimethoprim-sulfamethoxazole (BACTRIM DS, SEPTRA DS) 160-800 mg per tablet     Sig: Take 1 Tablet by mouth two (2) times a day. Dispense:  28 Tablet     Refill:  0       1. Cellulitis of left lower extremity  GO TO ED IF REDNESS/PAIN WORSEND  - trimethoprim-sulfamethoxazole (BACTRIM DS, SEPTRA DS) 160-800 mg per tablet; Take 1 Tablet by mouth two (2) times a day. Dispense: 28 Tablet; Refill: 0    2. Essential hypertension  Refill med  - amLODIPine-benazepril (LOTREL) 10-20 mg per capsule; Take 1 Capsule by mouth daily. Dispense: 90 Capsule; Refill: 0      There are no Patient Instructions on file for this visit. Follow-up and Dispositions    Return if symptoms worsen or fail to improve. I have reviewed with the patient details of the assessment and plan and all questions were answered. Relevent patient education was performed. The most recent lab findings were reviewed with the patient. An After Visit Summary was printed and given to the patient.

## 2022-11-01 ENCOUNTER — NURSE TRIAGE (OUTPATIENT)
Dept: OTHER | Facility: CLINIC | Age: 62
End: 2022-11-01

## 2022-11-01 NOTE — TELEPHONE ENCOUNTER
Location of patient: [place] [address] call from Bria Andino at [facility] with [employer]. Subjective: Caller states \"My leg is still swollen\"     Current Symptoms: Caller reports that his L lower leg is still swollen from his office visit on 10/18. Boil on leg has improved. Patient is still taking antibiotic. Patient was told to call back if edema has not improved    Onset: 2 weeks    Associated Symptoms: Edema    Pain Severity: NA    Temperature: NA    What has been tried: Antibiotics, Ibuprofen    LMP: NA Pregnant: NA    Recommended disposition:  Trying to schedule for Friday 11/4 as he is off work    Care advice provided, patient verbalizes understanding; denies any other questions or concerns; instructed to call back for any new or worsening symptoms. Patient/Caller agrees with recommended disposition; writer provided warm transfer to Bria Andino at [facility] for appointment scheduling    Attention Provider: Thank you for allowing me to participate in the care of your patient. The patient was connected to triage in response to information provided to the [facility]. Please do not respond through this encounter as the response is not directed to a shared pool.       Reason for Disposition   Caller has already spoken with the PCP (or office), and has no further questions    Protocols used: No Contact or Duplicate Contact Call-ADULT-[place]

## 2022-11-09 ENCOUNTER — OFFICE VISIT (OUTPATIENT)
Dept: INTERNAL MEDICINE CLINIC | Age: 62
End: 2022-11-09
Payer: COMMERCIAL

## 2022-11-09 VITALS
HEIGHT: 72 IN | OXYGEN SATURATION: 97 % | SYSTOLIC BLOOD PRESSURE: 116 MMHG | BODY MASS INDEX: 36.03 KG/M2 | RESPIRATION RATE: 18 BRPM | HEART RATE: 86 BPM | TEMPERATURE: 97.8 F | DIASTOLIC BLOOD PRESSURE: 72 MMHG | WEIGHT: 266 LBS

## 2022-11-09 DIAGNOSIS — R60.0 BILATERAL LEG EDEMA: Primary | ICD-10-CM

## 2022-11-09 DIAGNOSIS — I10 ESSENTIAL HYPERTENSION: ICD-10-CM

## 2022-11-09 DIAGNOSIS — L81.9 HYPERPIGMENTED SKIN LESION: ICD-10-CM

## 2022-11-09 PROCEDURE — 99214 OFFICE O/P EST MOD 30 MIN: CPT | Performed by: INTERNAL MEDICINE

## 2022-11-09 RX ORDER — CHLORTHALIDONE 25 MG/1
25 TABLET ORAL DAILY
Qty: 30 TABLET | Refills: 2 | Status: SHIPPED | OUTPATIENT
Start: 2022-11-09

## 2022-11-09 NOTE — PROGRESS NOTES
Eden Richards is a 63 y.o. male and presents with Leg Swelling ( )  . Subjective:    Pt comes-in for f/up nino le edema, worse since starting amlodipine    PMH:   HTN-on lotrel 10/20  BP Readings from Last 3 Encounters:   22 116/72   10/18/22 130/86   22 (!) 126/90      Covid infection and pneumonia 3/2021    PSH- appendectomy    SH-    No tob occas no heroin/cocaine    FH- father  ? ? Mother  80 natural causes   8 siblings 1 sister  aneurysm, 1 brother ESRD due to drug use    HM    Colonoscopy < 10 yrs ago  Immunizations  Eye care  Dental care        Review of Systems  Review of systems (12) negative, except noted above. Past Medical History:   Diagnosis Date    COVID-19 2021    Gastrointestinal disorder     GERDS    GERD (gastroesophageal reflux disease)     Hypertension     Ill-defined condition     MRSA     Past Surgical History:   Procedure Laterality Date    HX APPENDECTOMY      HX HEENT      right eye surgery    HX OTHER SURGICAL      right eye surgery    HX UROLOGICAL      laser surgery on \"my private parts\"     Social History     Socioeconomic History    Marital status:    Tobacco Use    Smoking status: Never    Smokeless tobacco: Never   Vaping Use    Vaping Use: Never used   Substance and Sexual Activity    Alcohol use: Yes     Comment: occasional    Drug use: Yes     Types: Marijuana     Comment: occasional    Sexual activity: Yes     No family history on file. Current Outpatient Medications   Medication Sig Dispense Refill    chlorthalidone (HYGROTON) 25 mg tablet Take 1 Tablet by mouth daily. 30 Tablet 2    ibuprofen (MOTRIN) 600 mg tablet Take 1 Tablet by mouth three (3) times daily as needed for Pain. 60 Tablet 0    amLODIPine-benazepril (LOTREL) 10-20 mg per capsule Take 1 Capsule by mouth daily. 90 Capsule 0    triamcinolone acetonide (KENALOG) 0.1 % ointment Apply  to affected area two (2) times a day.  use thin layer to leg area prn 60 g 1 omeprazole (PRILOSEC) 20 mg capsule Take 1 Capsule by mouth daily. 30 Capsule 2    albuterol (PROVENTIL HFA, VENTOLIN HFA, PROAIR HFA) 90 mcg/actuation inhaler Take 1 Puff by inhalation every six (6) hours as needed for Wheezing. (Patient not taking: Reported on 10/18/2022) 1 Inhaler 5     No Known Allergies    Objective:  Visit Vitals  /72 (BP 1 Location: Right upper arm, BP Patient Position: Sitting, BP Cuff Size: Large adult)   Pulse 86   Temp 97.8 °F (36.6 °C) (Temporal)   Resp 18   Ht 6' (1.829 m)   Wt 266 lb (120.7 kg)   SpO2 97%   BMI 36.08 kg/m²     Physical Exam:   General appearance - alert, well appearing, and in no distress  Mental status - alert, oriented to person, place, and time  EYE-EOMI  Neck - supple, no significant adenopathy   Chest - clear to auscultation  Heart - normal rate, regular rhythm, normal S1, S2  Abdomen - soft, nontender, obese   Ext-peripheral pulses normal, no pedal edema, no clubbing or cyanosis  Skin-Warm and dry. left>rt +2 colin edema hyperpig raised lesions of varying sizes anterior shin area nino  Neuro -alert, oriented, normal speech, no focal findings or movement disorder noted      Results for orders placed or performed in visit on 94/76/01   METABOLIC PANEL, COMPREHENSIVE   Result Value Ref Range    Glucose 98 70 - 99 mg/dL    BUN 12 8 - 27 mg/dL    Creatinine 0.95 0.76 - 1.27 mg/dL    eGFR 92 >59 mL/min/1.73    BUN/Creatinine ratio 13 10 - 24    Sodium 140 134 - 144 mmol/L    Potassium 4.0 3.5 - 5.2 mmol/L    Chloride 106 96 - 106 mmol/L    CO2 20 20 - 29 mmol/L    Calcium 9.1 8.6 - 10.2 mg/dL    Protein, total 7.7 6.0 - 8.5 g/dL    Albumin 4.4 3.8 - 4.9 g/dL    GLOBULIN, TOTAL 3.3 1.5 - 4.5 g/dL    A-G Ratio 1.3 1.2 - 2.2    Bilirubin, total 0.6 0.0 - 1.2 mg/dL    Alk.  phosphatase 74 44 - 121 IU/L    AST (SGOT) 50 (H) 0 - 40 IU/L    ALT (SGPT) 46 (H) 0 - 44 IU/L   LIPID PANEL   Result Value Ref Range    Cholesterol, total 141 100 - 199 mg/dL    Triglyceride 279 (H) 0 - 149 mg/dL    HDL Cholesterol 28 (L) >39 mg/dL    VLDL, calculated 45 (H) 5 - 40 mg/dL    LDL, calculated 68 0 - 99 mg/dL   CBC WITH AUTOMATED DIFF   Result Value Ref Range    WBC 5.7 3.4 - 10.8 x10E3/uL    RBC 4.39 4.14 - 5.80 x10E6/uL    HGB 13.2 13.0 - 17.7 g/dL    HCT 37.0 (L) 37.5 - 51.0 %    MCV 84 79 - 97 fL    MCH 30.1 26.6 - 33.0 pg    MCHC 35.7 31.5 - 35.7 g/dL    RDW 13.4 11.6 - 15.4 %    PLATELET 391 (L) 204 - 450 x10E3/uL    NEUTROPHILS 45 Not Estab. %    Lymphocytes 41 Not Estab. %    MONOCYTES 10 Not Estab. %    EOSINOPHILS 3 Not Estab. %    BASOPHILS 1 Not Estab. %    ABS. NEUTROPHILS 2.6 1.4 - 7.0 x10E3/uL    Abs Lymphocytes 2.4 0.7 - 3.1 x10E3/uL    ABS. MONOCYTES 0.6 0.1 - 0.9 x10E3/uL    ABS. EOSINOPHILS 0.2 0.0 - 0.4 x10E3/uL    ABS. BASOPHILS 0.0 0.0 - 0.2 x10E3/uL    IMMATURE GRANULOCYTES 0 Not Estab. %    ABS. IMM. GRANS. 0.0 0.0 - 0.1 x10E3/uL   THYROID CASCADE PROFILE   Result Value Ref Range    TSH 1.450 0.450 - 4.500 uIU/mL   CVD REPORT   Result Value Ref Range    INTERPRETATION Note        Assessment/Plan:    ICD-10-CM ICD-9-CM    1. Bilateral leg edema  S14.2 320.9 METABOLIC PANEL, COMPREHENSIVE      LIPID PANEL      CBC WITH AUTOMATED DIFF      THYROID CASCADE PROFILE      METABOLIC PANEL, COMPREHENSIVE      LIPID PANEL      CBC WITH AUTOMATED DIFF      THYROID CASCADE PROFILE      DUPLEX LOWER EXT VENOUS BILAT      2. Essential hypertension  I10 401.9 chlorthalidone (HYGROTON) 25 mg tablet      3.  Hyperpigmented skin lesion  L81.9 709.00 REFERRAL TO DERMATOLOGY          Orders Placed This Encounter    METABOLIC PANEL, COMPREHENSIVE     Standing Status:   Future     Number of Occurrences:   1     Standing Expiration Date:   11/9/2023    LIPID PANEL     Standing Status:   Future     Number of Occurrences:   1     Standing Expiration Date:   11/9/2023    CBC WITH AUTOMATED DIFF     Standing Status:   Future     Number of Occurrences:   1     Standing Expiration Date:   11/9/2023 THYROID CASCADE PROFILE     Standing Status:   Future     Number of Occurrences:   1     Standing Expiration Date:   11/9/2023    CVD REPORT    REFERRAL TO DERMATOLOGY     Referral Priority:   Routine     Referral Type:   Consultation     Referral Reason:   Specialty Services Required     Requested Specialty:   Dermatology Physician     Number of Visits Requested:   1    DUPLEX LOWER EXT VENOUS BILAT     Standing Status:   Future     Number of Occurrences:   1     Standing Expiration Date:   5/9/2023    chlorthalidone (HYGROTON) 25 mg tablet     Sig: Take 1 Tablet by mouth daily. Dispense:  30 Tablet     Refill:  2       1. Bilateral leg edema  D/w pt low salt diet/le elevation  No evidence of cardiac dysfxn  - METABOLIC PANEL, COMPREHENSIVE; Future  - LIPID PANEL; Future  - CBC WITH AUTOMATED DIFF; Future  - THYROID CASCADE PROFILE; Future  - METABOLIC PANEL, COMPREHENSIVE  - LIPID PANEL  - CBC WITH AUTOMATED DIFF  - THYROID CASCADE PROFILE  - DUPLEX LOWER EXT VENOUS BILAT; Future    2. Essential hypertension  Controlled    3. Hyperpigmented skin lesion  ? Lichen   - REFERRAL TO DERMATOLOGY          There are no Patient Instructions on file for this visit. Follow-up and Dispositions    Return in about 6 weeks (around 12/21/2022) for f/u le edema. I have reviewed with the patient details of the assessment and plan and all questions were answered. Relevent patient education was performed. The most recent lab findings were reviewed with the patient. An After Visit Summary was printed and given to the patient.

## 2022-11-09 NOTE — PROGRESS NOTES
Chief Complaint   Patient presents with    Leg Swelling             1. \"Have you been to the ER, urgent care clinic since your last visit? Hospitalized since your last visit? \" No    2. \"Have you seen or consulted any other health care providers outside of the [address] [place] [address] since your last visit? \" No     3. For patients aged 39-70: Has the patient had a colonoscopy / FIT/ Cologuard? No      If the patient is female:    4. For patients aged 41-77: Has the patient had a mammogram within the past 2 years? NA - based on age or sex      11. For patients aged 21-65: Has the patient had a pap smear?  NA - based on age or sex

## 2022-11-10 ENCOUNTER — HOSPITAL ENCOUNTER (OUTPATIENT)
Dept: VASCULAR SURGERY | Age: 62
Discharge: HOME | End: 2022-11-10
Payer: COMMERCIAL

## 2022-11-10 DIAGNOSIS — R60.0 BILATERAL LEG EDEMA: ICD-10-CM

## 2022-11-10 LAB
ALBUMIN SERPL-MCNC: 4.4 G/DL (ref 3.8–4.9)
ALBUMIN/GLOB SERPL: 1.3 {RATIO} (ref 1.2–2.2)
ALP SERPL-CCNC: 74 IU/L (ref 44–121)
ALT SERPL-CCNC: 46 IU/L (ref 0–44)
AST SERPL-CCNC: 50 IU/L (ref 0–40)
BASOPHILS # BLD AUTO: 0 X10E3/UL (ref 0–0.2)
BASOPHILS NFR BLD AUTO: 1 %
BILIRUB SERPL-MCNC: 0.6 MG/DL (ref 0–1.2)
BUN SERPL-MCNC: 12 MG/DL (ref 8–27)
BUN/CREAT SERPL: 13 (ref 10–24)
CALCIUM SERPL-MCNC: 9.1 MG/DL (ref 8.6–10.2)
CHLORIDE SERPL-SCNC: 106 MMOL/L (ref 96–106)
CHOLEST SERPL-MCNC: 141 MG/DL (ref 100–199)
CO2 SERPL-SCNC: 20 MMOL/L (ref 20–29)
CREAT SERPL-MCNC: 0.95 MG/DL (ref 0.76–1.27)
EGFR: 92 ML/MIN/1.73
EOSINOPHIL # BLD AUTO: 0.2 X10E3/UL (ref 0–0.4)
EOSINOPHIL NFR BLD AUTO: 3 %
ERYTHROCYTE [DISTWIDTH] IN BLOOD BY AUTOMATED COUNT: 13.4 % (ref 11.6–15.4)
GLOBULIN SER CALC-MCNC: 3.3 G/DL (ref 1.5–4.5)
GLUCOSE SERPL-MCNC: 98 MG/DL (ref 70–99)
HCT VFR BLD AUTO: 37 % (ref 37.5–51)
HDLC SERPL-MCNC: 28 MG/DL
HGB BLD-MCNC: 13.2 G/DL (ref 13–17.7)
IMM GRANULOCYTES # BLD AUTO: 0 X10E3/UL (ref 0–0.1)
IMM GRANULOCYTES NFR BLD AUTO: 0 %
IMP & REVIEW OF LAB RESULTS: NORMAL
LDLC SERPL CALC-MCNC: 68 MG/DL (ref 0–99)
LYMPHOCYTES # BLD AUTO: 2.4 X10E3/UL (ref 0.7–3.1)
LYMPHOCYTES NFR BLD AUTO: 41 %
MCH RBC QN AUTO: 30.1 PG (ref 26.6–33)
MCHC RBC AUTO-ENTMCNC: 35.7 G/DL (ref 31.5–35.7)
MCV RBC AUTO: 84 FL (ref 79–97)
MONOCYTES # BLD AUTO: 0.6 X10E3/UL (ref 0.1–0.9)
MONOCYTES NFR BLD AUTO: 10 %
NEUTROPHILS # BLD AUTO: 2.6 X10E3/UL (ref 1.4–7)
NEUTROPHILS NFR BLD AUTO: 45 %
PLATELET # BLD AUTO: 147 X10E3/UL (ref 150–450)
POTASSIUM SERPL-SCNC: 4 MMOL/L (ref 3.5–5.2)
PROT SERPL-MCNC: 7.7 G/DL (ref 6–8.5)
RBC # BLD AUTO: 4.39 X10E6/UL (ref 4.14–5.8)
SODIUM SERPL-SCNC: 140 MMOL/L (ref 134–144)
TRIGL SERPL-MCNC: 279 MG/DL (ref 0–149)
TSH SERPL DL<=0.005 MIU/L-ACNC: 1.45 UIU/ML (ref 0.45–4.5)
VLDLC SERPL CALC-MCNC: 45 MG/DL (ref 5–40)
WBC # BLD AUTO: 5.7 X10E3/UL (ref 3.4–10.8)

## 2022-11-10 PROCEDURE — 93970 EXTREMITY STUDY: CPT | Performed by: INTERNAL MEDICINE

## 2022-11-10 PROCEDURE — 93970 EXTREMITY STUDY: CPT

## 2022-12-09 RX ORDER — IBUPROFEN 600 MG/1
600 TABLET ORAL
Qty: 60 TABLET | Refills: 0 | Status: SHIPPED | OUTPATIENT
Start: 2022-12-09

## 2023-01-04 ENCOUNTER — OFFICE VISIT (OUTPATIENT)
Dept: INTERNAL MEDICINE CLINIC | Age: 63
End: 2023-01-04
Payer: COMMERCIAL

## 2023-01-04 VITALS
HEART RATE: 94 BPM | RESPIRATION RATE: 18 BRPM | OXYGEN SATURATION: 96 % | TEMPERATURE: 98.3 F | SYSTOLIC BLOOD PRESSURE: 140 MMHG | BODY MASS INDEX: 36.39 KG/M2 | WEIGHT: 268.7 LBS | HEIGHT: 72 IN | DIASTOLIC BLOOD PRESSURE: 83 MMHG

## 2023-01-04 DIAGNOSIS — Z12.11 COLON CANCER SCREENING: ICD-10-CM

## 2023-01-04 DIAGNOSIS — R73.09 ELEVATED GLUCOSE: ICD-10-CM

## 2023-01-04 DIAGNOSIS — Z12.5 PROSTATE CANCER SCREENING: ICD-10-CM

## 2023-01-04 DIAGNOSIS — L73.9 FOLLICULITIS: ICD-10-CM

## 2023-01-04 DIAGNOSIS — I10 ESSENTIAL HYPERTENSION: Primary | ICD-10-CM

## 2023-01-04 DIAGNOSIS — K21.00 GASTROESOPHAGEAL REFLUX DISEASE WITH ESOPHAGITIS WITHOUT HEMORRHAGE: ICD-10-CM

## 2023-01-04 PROCEDURE — 99214 OFFICE O/P EST MOD 30 MIN: CPT | Performed by: INTERNAL MEDICINE

## 2023-01-04 RX ORDER — OMEPRAZOLE 20 MG/1
20 CAPSULE, DELAYED RELEASE ORAL DAILY
Qty: 30 CAPSULE | Refills: 2 | Status: SHIPPED | OUTPATIENT
Start: 2023-01-04

## 2023-01-04 RX ORDER — SULFAMETHOXAZOLE AND TRIMETHOPRIM 800; 160 MG/1; MG/1
1 TABLET ORAL 2 TIMES DAILY
Qty: 14 TABLET | Refills: 0 | Status: SHIPPED | OUTPATIENT
Start: 2023-01-04

## 2023-01-04 RX ORDER — AMLODIPINE AND BENAZEPRIL HYDROCHLORIDE 10; 20 MG/1; MG/1
1 CAPSULE ORAL DAILY
Qty: 90 CAPSULE | Refills: 1 | Status: SHIPPED | OUTPATIENT
Start: 2023-01-04

## 2023-01-04 RX ORDER — CHLORTHALIDONE 25 MG/1
25 TABLET ORAL DAILY
Qty: 90 TABLET | Refills: 1 | Status: SHIPPED | OUTPATIENT
Start: 2023-01-04

## 2023-01-04 NOTE — PROGRESS NOTES
Susan Lentz is a 63 y.o. male and presents with Follow-up  . Subjective:    Pt stopped talking his amlodipine/benzepril bc he \"ran out\"    Pt w c/o 2 recurrent boils rle    PMH:   HTN-on lotrel 10/20 and hctz  BP Readings from Last 3 Encounters:   23 (!) 140/83   22 116/72   10/18/22 130/86      Covid infection and pneumonia 3/2021    PSH- appendectomy    SH-    No tob occas no heroin/cocaine    FH- father  ? ? Mother  80 natural causes   8 siblings 1 sister  aneurysm, 1 brother ESRD due to drug use    HM    Colonoscopy < 10 yrs ago  Immunizations  Eye care  Dental care        Review of Systems  Review of systems (12) negative, except noted above. Past Medical History:   Diagnosis Date    COVID-19 2021    Gastrointestinal disorder     GERDS    GERD (gastroesophageal reflux disease)     Hypertension     Ill-defined condition     MRSA     Past Surgical History:   Procedure Laterality Date    HX APPENDECTOMY      HX HEENT      right eye surgery    HX OTHER SURGICAL      right eye surgery    HX UROLOGICAL      laser surgery on \"my private parts\"     Social History     Socioeconomic History    Marital status:    Tobacco Use    Smoking status: Never    Smokeless tobacco: Never   Vaping Use    Vaping Use: Never used   Substance and Sexual Activity    Alcohol use: Yes     Comment: occasional    Drug use: Yes     Types: Marijuana     Comment: occasional    Sexual activity: Yes     History reviewed. No pertinent family history. Current Outpatient Medications   Medication Sig Dispense Refill    amLODIPine-benazepril (LOTREL) 10-20 mg per capsule Take 1 Capsule by mouth daily. 90 Capsule 1    omeprazole (PRILOSEC) 20 mg capsule Take 1 Capsule by mouth daily. 30 Capsule 2    chlorthalidone (HYGROTON) 25 mg tablet Take 1 Tablet by mouth daily. 90 Tablet 1    trimethoprim-sulfamethoxazole (BACTRIM DS, SEPTRA DS) 160-800 mg per tablet Take 1 Tablet by mouth two (2) times a day. 14 Tablet 0    ibuprofen (MOTRIN) 600 mg tablet Take 1 Tablet by mouth three (3) times daily as needed for Pain. 60 Tablet 0    triamcinolone acetonide (KENALOG) 0.1 % ointment Apply  to affected area two (2) times a day. use thin layer to leg area prn 60 g 1    albuterol (PROVENTIL HFA, VENTOLIN HFA, PROAIR HFA) 90 mcg/actuation inhaler Take 1 Puff by inhalation every six (6) hours as needed for Wheezing. (Patient not taking: No sig reported) 1 Inhaler 5     No Known Allergies    Objective:  Visit Vitals  BP (!) 140/83   Pulse 94   Temp 98.3 °F (36.8 °C) (Temporal)   Resp 18   Ht 6' (1.829 m)   Wt 268 lb 11.2 oz (121.9 kg)   SpO2 96%   BMI 36.44 kg/m²     Physical Exam:   General appearance - alert, well appearing, and in no distress  Mental status - alert, oriented to person, place, and time  EYE-EOMI  Neck - supple, no significant adenopathy   Chest - clear to auscultation  Heart - normal rate, regular rhythm, normal S1, S2  Abdomen - soft, nontender, obese   Ext-peripheral pulses normal, no pedal edema, no clubbing or cyanosis  Skin-Warm and dry. left>rt +2 colin edema hyperpig raised lesions of varying sizes anterior shin area nino  Neuro -alert, oriented, normal speech, no focal findings or movement disorder noted      Results for orders placed or performed in visit on 33/36/70   METABOLIC PANEL, COMPREHENSIVE   Result Value Ref Range    Glucose 98 70 - 99 mg/dL    BUN 12 8 - 27 mg/dL    Creatinine 0.95 0.76 - 1.27 mg/dL    eGFR 92 >59 mL/min/1.73    BUN/Creatinine ratio 13 10 - 24    Sodium 140 134 - 144 mmol/L    Potassium 4.0 3.5 - 5.2 mmol/L    Chloride 106 96 - 106 mmol/L    CO2 20 20 - 29 mmol/L    Calcium 9.1 8.6 - 10.2 mg/dL    Protein, total 7.7 6.0 - 8.5 g/dL    Albumin 4.4 3.8 - 4.9 g/dL    GLOBULIN, TOTAL 3.3 1.5 - 4.5 g/dL    A-G Ratio 1.3 1.2 - 2.2    Bilirubin, total 0.6 0.0 - 1.2 mg/dL    Alk.  phosphatase 74 44 - 121 IU/L    AST (SGOT) 50 (H) 0 - 40 IU/L    ALT (SGPT) 46 (H) 0 - 44 IU/L   LIPID PANEL   Result Value Ref Range    Cholesterol, total 141 100 - 199 mg/dL    Triglyceride 279 (H) 0 - 149 mg/dL    HDL Cholesterol 28 (L) >39 mg/dL    VLDL, calculated 45 (H) 5 - 40 mg/dL    LDL, calculated 68 0 - 99 mg/dL   CBC WITH AUTOMATED DIFF   Result Value Ref Range    WBC 5.7 3.4 - 10.8 x10E3/uL    RBC 4.39 4.14 - 5.80 x10E6/uL    HGB 13.2 13.0 - 17.7 g/dL    HCT 37.0 (L) 37.5 - 51.0 %    MCV 84 79 - 97 fL    MCH 30.1 26.6 - 33.0 pg    MCHC 35.7 31.5 - 35.7 g/dL    RDW 13.4 11.6 - 15.4 %    PLATELET 092 (L) 697 - 450 x10E3/uL    NEUTROPHILS 45 Not Estab. %    Lymphocytes 41 Not Estab. %    MONOCYTES 10 Not Estab. %    EOSINOPHILS 3 Not Estab. %    BASOPHILS 1 Not Estab. %    ABS. NEUTROPHILS 2.6 1.4 - 7.0 x10E3/uL    Abs Lymphocytes 2.4 0.7 - 3.1 x10E3/uL    ABS. MONOCYTES 0.6 0.1 - 0.9 x10E3/uL    ABS. EOSINOPHILS 0.2 0.0 - 0.4 x10E3/uL    ABS. BASOPHILS 0.0 0.0 - 0.2 x10E3/uL    IMMATURE GRANULOCYTES 0 Not Estab. %    ABS. IMM. GRANS. 0.0 0.0 - 0.1 x10E3/uL   THYROID CASCADE PROFILE   Result Value Ref Range    TSH 1.450 0.450 - 4.500 uIU/mL   CVD REPORT   Result Value Ref Range    INTERPRETATION Note        Assessment/Plan:    ICD-10-CM ICD-9-CM    1. Colon cancer screening  Z12.11 V76.51 REFERRAL TO GASTROENTEROLOGY      2. Essential hypertension  I10 401.9 amLODIPine-benazepril (LOTREL) 10-20 mg per capsule      chlorthalidone (HYGROTON) 25 mg tablet      3. Gastroesophageal reflux disease with esophagitis without hemorrhage  K21.00 530.81 omeprazole (PRILOSEC) 20 mg capsule     530.10       4.  Prostate cancer screening  Z12.5 V76.44 PSA SCREENING (SCREENING)      5. Elevated glucose  R73.09 790.29 HEMOGLOBIN A1C WITH EAG          Orders Placed This Encounter    PSA SCREENING (SCREENING)     Standing Status:   Future     Standing Expiration Date:   1/4/2024    HEMOGLOBIN A1C WITH EAG     Standing Status:   Future     Standing Expiration Date:   1/4/2024    [facility] – [place], [place]     Referral Priority: Routine     Referral Type:   Consultation     Referral Reason:   Specialty Services Required     Referred to Provider:   Yolanda Sosa MD     Number of Visits Requested:   1    amLODIPine-benazepril (LOTREL) 10-20 mg per capsule     Sig: Take 1 Capsule by mouth daily. Dispense:  90 Capsule     Refill:  1    omeprazole (PRILOSEC) 20 mg capsule     Sig: Take 1 Capsule by mouth daily. Dispense:  30 Capsule     Refill:  2    chlorthalidone (HYGROTON) 25 mg tablet     Sig: Take 1 Tablet by mouth daily. Dispense:  90 Tablet     Refill:  1    trimethoprim-sulfamethoxazole (BACTRIM DS, SEPTRA DS) 160-800 mg per tablet     Sig: Take 1 Tablet by mouth two (2) times a day. Dispense:  14 Tablet     Refill:  0       1. Essential hypertension  Re-rx med and d/w pt he has to take bOTH daily  D/w pt avoidance of salt  - amLODIPine-benazepril (LOTREL) 10-20 mg per capsule; Take 1 Capsule by mouth daily. Dispense: 90 Capsule; Refill: 1  - chlorthalidone (HYGROTON) 25 mg tablet; Take 1 Tablet by mouth daily. Dispense: 90 Tablet; Refill: 1    2. Gastroesophageal reflux disease with esophagitis without hemorrhage    - omeprazole (PRILOSEC) 20 mg capsule; Take 1 Capsule by mouth daily. Dispense: 30 Capsule; Refill: 2    3. Folliculitis  Bactrim  F/up derm    4. Colon cancer screening    - REFERRAL TO GASTROENTEROLOGY    5. Prostate cancer screening    - PSA SCREENING (SCREENING); Future    6. Elevated glucose    - HEMOGLOBIN A1C WITH EAG; Future            There are no Patient Instructions on file for this visit. Follow-up and Dispositions    Return in about 3 months (around 4/4/2023) for bp check. I have reviewed with the patient details of the assessment and plan and all questions were answered. Relevent patient education was performed. The most recent lab findings were reviewed with the patient. An After Visit Summary was printed and given to the patient.

## 2023-01-04 NOTE — PROGRESS NOTES
Debora Chun is a 63 y.o. male  HIPAA verified by two patient identifiers. Health Maintenance Due   Topic    Hepatitis C Screening     DTaP/Tdap/Td series (1 - Tdap)    Colorectal Cancer Screening Combo     Shingles Vaccine (1 of 2)    COVID-19 Vaccine (4 - Booster for [employer] series)    Flu Vaccine (1)     Chief Complaint   Patient presents with    Follow-up     Visit Vitals  BP (!) 140/83   Pulse 94   Temp 98.3 °F (36.8 °C) (Temporal)   Resp 18   Ht 6' (1.829 m)   Wt 268 lb 11.2 oz (121.9 kg)   SpO2 96%   BMI 36.44 kg/m²       Pain Scale: 0 - No pain/10  Pain Location:   1. Have you been to the ER, urgent care clinic since your last visit? Hospitalized since your last visit? No    2. Have you seen or consulted any other health care providers outside of the [address] [place] [address] since your last visit? Include any pap smears or colon screening.  No

## 2023-03-20 ENCOUNTER — HOSPITAL ENCOUNTER (EMERGENCY)
Age: 63
Discharge: HOME | End: 2023-03-20
Attending: EMERGENCY MEDICINE
Payer: COMMERCIAL

## 2023-03-20 VITALS
SYSTOLIC BLOOD PRESSURE: 127 MMHG | HEART RATE: 86 BPM | HEIGHT: 72 IN | WEIGHT: 265 LBS | OXYGEN SATURATION: 97 % | RESPIRATION RATE: 16 BRPM | BODY MASS INDEX: 35.89 KG/M2 | TEMPERATURE: 97.5 F | DIASTOLIC BLOOD PRESSURE: 86 MMHG

## 2023-03-20 DIAGNOSIS — H10.33 ACUTE BACTERIAL CONJUNCTIVITIS OF BOTH EYES: Primary | ICD-10-CM

## 2023-03-20 PROCEDURE — 99283 EMERGENCY DEPT VISIT LOW MDM: CPT

## 2023-03-20 RX ORDER — POLYMYXIN B SULFATE AND TRIMETHOPRIM 1; 10000 MG/ML; [USP'U]/ML
1 SOLUTION OPHTHALMIC EVERY 4 HOURS
Qty: 10 ML | Refills: 0 | Status: SHIPPED | OUTPATIENT
Start: 2023-03-20 | End: 2023-03-27

## 2023-03-20 NOTE — ED PROVIDER NOTES
EMERGENCY DEPARTMENT HISTORY AND PHYSICAL EXAM      Patient Name: Vivian De La Torre  MRN: [id number]  YOB: 1960    Provider: Isabel Ferrer MD  PCP: Summer Hernandez MD    Time/Date of evaluation: 7:40 AM 3/20/23    History of Presenting Illness     Chief Complaint   Patient presents with    Eye Pain     History Provided By: Patient     History Sarah Blancas):   Vivian De La Torre is a 63 y.o. male with a PMHx of hypertension and GERD  who presents to the emergency department by POV C/O bilateral eye pain, redness, and a burning pain for the past 4 days. Patient denies any foreign body sensation. He tells me he has some dry crusting discharge on his eyes each of the last 3 mornings. He denies any blurry vision or visual changes. He also denies any contact or corrective lens use. Past History     Past Medical History:  Past Medical History:   Diagnosis Date    COVID-19 03/2021    Gastrointestinal disorder     GERDS    GERD (gastroesophageal reflux disease)     Hypertension     Ill-defined condition     MRSA       Past Surgical History:  Past Surgical History:   Procedure Laterality Date    HX APPENDECTOMY      HX HEENT      right eye surgery    HX OTHER SURGICAL      right eye surgery    HX UROLOGICAL      laser surgery on \"my private parts\"       Family History:  History reviewed. No pertinent family history. Social History:  Social History     Tobacco Use    Smoking status: Never    Smokeless tobacco: Never   Vaping Use    Vaping Use: Never used   Substance Use Topics    Alcohol use: Yes     Comment: occasional    Drug use: Yes     Types: Marijuana     Comment: occasional       Medications:  Current Outpatient Medications   Medication Sig Dispense Refill    trimethoprim-polymyxin b (POLYTRIM) ophthalmic solution Administer 1 Drop to both eyes every four (4) hours for 7 days. 10 mL 0    amLODIPine-benazepril (LOTREL) 10-20 mg per capsule Take 1 Capsule by mouth daily.  90 Capsule 1    omeprazole (PRILOSEC) 20 mg capsule Take 1 Capsule by mouth daily. 30 Capsule 2    chlorthalidone (HYGROTON) 25 mg tablet Take 1 Tablet by mouth daily. (Patient not taking: Reported on 3/20/2023) 90 Tablet 1    trimethoprim-sulfamethoxazole (BACTRIM DS, SEPTRA DS) 160-800 mg per tablet Take 1 Tablet by mouth two (2) times a day. (Patient not taking: Reported on 3/20/2023) 14 Tablet 0    ibuprofen (MOTRIN) 600 mg tablet Take 1 Tablet by mouth three (3) times daily as needed for Pain. (Patient not taking: Reported on 3/20/2023) 60 Tablet 0    triamcinolone acetonide (KENALOG) 0.1 % ointment Apply  to affected area two (2) times a day. use thin layer to leg area prn (Patient not taking: Reported on 3/20/2023) 60 g 1    albuterol (PROVENTIL HFA, VENTOLIN HFA, PROAIR HFA) 90 mcg/actuation inhaler Take 1 Puff by inhalation every six (6) hours as needed for Wheezing. (Patient not taking: No sig reported) 1 Inhaler 5       Allergies:  No Known Allergies    Social Determinants of Health:  Social Determinants of Health     Tobacco Use: Low Risk     Smoking Tobacco Use: Never    Smokeless Tobacco Use: Never    Passive Exposure: Not on file   Alcohol Use: Not on file   Financial Resource Strain: Not on file   Food Insecurity: Not on file   Transportation Needs: Not on file   Physical Activity: Not on file   Stress: Not on file   Social Connections: Not on file   Intimate Partner Violence: Not on file   Depression: Not at risk    PHQ-2 Score: 0   Housing Stability: Not on file       Review of Systems     Review of Systems   Eyes:  Positive for pain, discharge, redness and itching. All other systems reviewed and are negative. Physical Exam     Patient Vitals for the past 24 hrs:   Temp Pulse Resp BP SpO2   03/20/23 0659 97.5 °F (36.4 °C) 86 16 127/86 97 %       Physical Exam  Vitals and nursing note reviewed. Constitutional:       Appearance: Normal appearance. He is well-developed. HENT:      Head: Normocephalic and atraumatic. Eyes:      General: Vision grossly intact. Gaze aligned appropriately. Conjunctiva/sclera:      Right eye: Right conjunctiva is injected. Left eye: Left conjunctiva is injected. Cardiovascular:      Rate and Rhythm: Normal rate and regular rhythm. Pulses: Normal pulses. Heart sounds: Normal heart sounds. Pulmonary:      Effort: Pulmonary effort is normal. No respiratory distress. Breath sounds: Normal breath sounds. Chest:      Chest wall: No tenderness. Abdominal:      General: Bowel sounds are normal.      Palpations: Abdomen is soft. Tenderness: There is no abdominal tenderness. There is no guarding or rebound. Musculoskeletal:      Cervical back: Full passive range of motion without pain, normal range of motion and neck supple. Skin:     General: Skin is warm and dry. Findings: No erythema or rash. Neurological:      Mental Status: He is alert and oriented to person, place, and time. Psychiatric:         Speech: Speech normal.         Behavior: Behavior normal.         Thought Content: Thought content normal.         Judgment: Judgment normal.       ED Course     7:40 AM I JUSTINA Helm am the first provider for this patient. Initial assessment performed. I reviewed the vital signs, available nursing notes, past medical history, past surgical history, family history and social history. The patients presenting problems have been discussed, and they are in agreement with the care plan formulated and outlined with them. I have encouraged them to ask questions as they arise throughout their visit. Records Reviewed: Nursing Notes and Old Medical Records    Pulse Oximetry Analysis - 97% on RA    MEDICATIONS ADMINISTERED IN THE ED:  Medications - No data to display    Medical Decision Making     DDX: Bacterial conjunctivitis, allergic conjunctivitis, scleritis    DISCUSSION:  This appears to be a moderate condition.   This appears to be an acute condition. 63 y.o. male presents with bilateral eye redness, discharge, and pain for the past 4 days. Patient states this started with 1 eye, but has since spread to both eyes. Would be unlikely to have a bacterial infection of both of the eyes simultaneously, but since it started in 1 eye and spread to the other, bacterial conjunctivitis is possible. Also discussed with patient that this could be seasonal allergies/allergic conjunctivitis. With the yellow crusting discharge, will treat with antibiotic drops and have him follow-up with outpatient primary care and ophthalmology. Discussed plan of care with patient who agrees. The patient agrees with the plan of discharge. Additional Considerations:  None    Diagnosis and Disposition     DISCHARGE NOTE:  Madeleine Alicea's results have been reviewed with him. He has been counseled regarding his diagnosis, treatment, and plan. He verbally conveys understanding and agreement of the signs, symptoms, diagnosis, treatment and prognosis and additionally agrees to follow up as discussed. He also agrees with the care-plan and conveys that all of his questions have been answered. I have also provided discharge instructions for him that include: educational information regarding their diagnosis and treatment, and list of reasons why they would want to return to the ED prior to their follow-up appointment, should his condition change. He has been provided with education for proper emergency department utilization. CLINICAL IMPRESSION:    1. Acute bacterial conjunctivitis of both eyes        PLAN:  1. D/C Home  2. Discharge Medication List as of 3/20/2023  7:44 AM        START taking these medications    Details   trimethoprim-polymyxin b (POLYTRIM) ophthalmic solution Administer 1 Drop to both eyes every four (4) hours for 7 days. , Normal, Disp-10 mL, R-0           CONTINUE these medications which have NOT CHANGED    Details   amLODIPine-benazepril (LOTREL) 10-20 mg per capsule Take 1 Capsule by mouth daily. , Normal, Disp-90 Capsule, R-1      omeprazole (PRILOSEC) 20 mg capsule Take 1 Capsule by mouth daily. , Normal, Disp-30 Capsule, R-2      chlorthalidone (HYGROTON) 25 mg tablet Take 1 Tablet by mouth daily. , Normal, Disp-90 Tablet, R-1      trimethoprim-sulfamethoxazole (BACTRIM DS, SEPTRA DS) 160-800 mg per tablet Take 1 Tablet by mouth two (2) times a day., Normal, Disp-14 Tablet, R-0      ibuprofen (MOTRIN) 600 mg tablet Take 1 Tablet by mouth three (3) times daily as needed for Pain., Normal, Disp-60 Tablet, R-0      triamcinolone acetonide (KENALOG) 0.1 % ointment Apply  to affected area two (2) times a day. use thin layer to leg area prn, Normal, Disp-60 g, R-1      albuterol (PROVENTIL HFA, VENTOLIN HFA, PROAIR HFA) 90 mcg/actuation inhaler Take 1 Puff by inhalation every six (6) hours as needed for Wheezing., Normal, Disp-1 Inhaler, R-5           3. Follow-up Information       Follow up With Specialties Details Why Contact Info    Tommy Mercado MD Internal Medicine Physician Schedule an appointment as soon as possible for a visit   [address] [place] [address]  [address] [facility] [place]      Thor Robert MD  Schedule an appointment as soon as possible for a visit   Address: [address] [place] [address]  Phone: [phone]    [address] [place] [address] EMERGENCY DEPT Emergency Medicine  As needed, If symptoms worsen [address] [facility]          I Jeffrey Barrios MD am the primary clinician of record. Dragon Disclaimer     Please note that this dictation was completed with [employer], the computer voice recognition software. Quite often unanticipated grammatical, syntax, homophones, and other interpretive errors are inadvertently transcribed by the computer software. Please disregard these errors. Please excuse any errors that have escaped final proofreading.     Jeffrey Barrios MD

## 2023-03-20 NOTE — Clinical Note
[facility] EMERGENCY DEPT  [address] [facility] [id number] [phone]    Work/School Note    Date: 3/20/2023    To Whom It May concern:    Sarbjit Kenyon was seen and treated today in the emergency room by the following provider(s):  Attending Provider: Damian Cueto MD.      Sarbjit Kenyon is excused from work/school on 03/20/23 and 03/21/23. He is medically clear to return to work/school on 3/22/2023.        Sincerely,          Christina Hilario MD

## 2023-03-20 NOTE — ED NOTES
Pt presents ambulatory to ED complaining of bilateral eye pain x4 days. Pt states having redness,itching,burning, and eye pain. Pt is alert and oriented x 4, RR even and unlabored, skin is warm and dry. Assesment completed and pt updated on plan of care. Emergency Department Nursing Plan of Care       The Nursing Plan of Care is developed from the Nursing assessment and Emergency Department Attending provider initial evaluation. The plan of care may be reviewed in the ED Provider note.     The Plan of Care was developed with the following considerations:   Patient / Family readiness to learn indicated by:verbalized understanding  Persons(s) to be included in education: patient  Barriers to Learning/Limitations:No    Signed     Lizbeth Nicholas RN    3/20/2023   7:16 AM

## 2023-03-20 NOTE — ED TRIAGE NOTES
Pt presents to the ED c/o bilateral eye pain, redness, blurriness, watering, and itchiness x4 days PTA. Pt reports he wakes up with crusted eyes. Pt reports he had tear duct surgery 15 years PTA.

## 2023-04-04 ENCOUNTER — OFFICE VISIT (OUTPATIENT)
Dept: INTERNAL MEDICINE CLINIC | Age: 63
End: 2023-04-04
Payer: COMMERCIAL

## 2023-04-04 PROCEDURE — 99213 OFFICE O/P EST LOW 20 MIN: CPT | Performed by: INTERNAL MEDICINE

## 2023-04-04 RX ORDER — AMLODIPINE AND BENAZEPRIL HYDROCHLORIDE 10; 20 MG/1; MG/1
1 CAPSULE ORAL DAILY
Qty: 90 CAPSULE | Refills: 1 | Status: SHIPPED
Start: 2023-04-04

## 2023-04-04 NOTE — PROGRESS NOTES
Belia Franklin is a 63 y.o. male and presents with Hypertension  . Subjective:      PMH:   HTN-on lotrel 10/20 and chlorthalidone  BP Readings from Last 3 Encounters:   23 117/71   23 127/86   23 (!) 140/83      Covid infection and pneumonia 3/2021    PSH- appendectomy    SH-    No tob occas no heroin/cocaine    FH- father  ? ? Mother  80 natural causes   8 siblings 1 sister  aneurysm, 1 brother ESRD due to drug use        Colonoscopy appt pending  Immunizations  Eye care  Dental care        Review of Systems  Review of systems (12) negative, except noted above. Past Medical History:   Diagnosis Date    COVID-19 2021    Gastrointestinal disorder     GERDS    GERD (gastroesophageal reflux disease)     Hypertension     Ill-defined condition     MRSA     Past Surgical History:   Procedure Laterality Date    HX APPENDECTOMY      HX HEENT      right eye surgery    HX OTHER SURGICAL      right eye surgery    HX UROLOGICAL      laser surgery on \"my private parts\"     Social History     Socioeconomic History    Marital status:    Tobacco Use    Smoking status: Never    Smokeless tobacco: Never   Vaping Use    Vaping Use: Never used   Substance and Sexual Activity    Alcohol use: Yes     Comment: occasional    Drug use: Yes     Types: Marijuana     Comment: occasional    Sexual activity: Yes     Social Determinants of Health     Financial Resource Strain: Low Risk     Difficulty of Paying Living Expenses: Not hard at all   Food Insecurity: No Food Insecurity    Worried About Running Out of Food in the Last Year: Never true    Ran Out of Food in the Last Year: Never true     No family history on file. Current Outpatient Medications   Medication Sig Dispense Refill    amLODIPine-benazepril (LOTREL) 10-20 mg per capsule Take 1 Capsule by mouth daily. 90 Capsule 1    omeprazole (PRILOSEC) 20 mg capsule Take 1 Capsule by mouth daily.  30 Capsule 2    chlorthalidone (HYGROTON) 25 mg tablet Take 1 Tablet by mouth daily. 90 Tablet 1    ibuprofen (MOTRIN) 600 mg tablet Take 1 Tablet by mouth three (3) times daily as needed for Pain. 60 Tablet 0    triamcinolone acetonide (KENALOG) 0.1 % ointment Apply  to affected area two (2) times a day. use thin layer to leg area prn (Patient not taking: No sig reported) 60 g 1    albuterol (PROVENTIL HFA, VENTOLIN HFA, PROAIR HFA) 90 mcg/actuation inhaler Take 1 Puff by inhalation every six (6) hours as needed for Wheezing. (Patient not taking: No sig reported) 1 Inhaler 5     No Known Allergies    Objective:  Visit Vitals  /71   Pulse 97   Temp 97.8 °F (36.6 °C) (Temporal)   Resp 18   Ht 6' (1.829 m)   Wt 265 lb (120.2 kg)   SpO2 98%   BMI 35.94 kg/m²       Physical Exam:   General appearance - alert, well appearing, and in no distress  Mental status - alert, oriented to person, place, and time  EYE-EOMI  Neck - supple, no significant adenopathy   Chest - clear to auscultation  Heart - normal rate, regular rhythm, normal S1, S2  Abdomen - soft, nontender, obese   Ext-peripheral pulses normal, no pedal edema, no clubbing or cyanosis  Skin-Warm and dry. left>rt +2 colin edema hyperpig raised lesions of varying sizes anterior shin area nino  Neuro -alert, oriented, normal speech, no focal findings or movement disorder noted      Results for orders placed or performed in visit on 38/87/52   METABOLIC PANEL, COMPREHENSIVE   Result Value Ref Range    Glucose 98 70 - 99 mg/dL    BUN 12 8 - 27 mg/dL    Creatinine 0.95 0.76 - 1.27 mg/dL    eGFR 92 >59 mL/min/1.73    BUN/Creatinine ratio 13 10 - 24    Sodium 140 134 - 144 mmol/L    Potassium 4.0 3.5 - 5.2 mmol/L    Chloride 106 96 - 106 mmol/L    CO2 20 20 - 29 mmol/L    Calcium 9.1 8.6 - 10.2 mg/dL    Protein, total 7.7 6.0 - 8.5 g/dL    Albumin 4.4 3.8 - 4.9 g/dL    GLOBULIN, TOTAL 3.3 1.5 - 4.5 g/dL    A-G Ratio 1.3 1.2 - 2.2    Bilirubin, total 0.6 0.0 - 1.2 mg/dL    Alk.  phosphatase 74 44 - 121 IU/L    AST (SGOT) 50 (H) 0 - 40 IU/L    ALT (SGPT) 46 (H) 0 - 44 IU/L   LIPID PANEL   Result Value Ref Range    Cholesterol, total 141 100 - 199 mg/dL    Triglyceride 279 (H) 0 - 149 mg/dL    HDL Cholesterol 28 (L) >39 mg/dL    VLDL, calculated 45 (H) 5 - 40 mg/dL    LDL, calculated 68 0 - 99 mg/dL   CBC WITH AUTOMATED DIFF   Result Value Ref Range    WBC 5.7 3.4 - 10.8 x10E3/uL    RBC 4.39 4.14 - 5.80 x10E6/uL    HGB 13.2 13.0 - 17.7 g/dL    HCT 37.0 (L) 37.5 - 51.0 %    MCV 84 79 - 97 fL    MCH 30.1 26.6 - 33.0 pg    MCHC 35.7 31.5 - 35.7 g/dL    RDW 13.4 11.6 - 15.4 %    PLATELET 522 (L) 901 - 450 x10E3/uL    NEUTROPHILS 45 Not Estab. %    Lymphocytes 41 Not Estab. %    MONOCYTES 10 Not Estab. %    EOSINOPHILS 3 Not Estab. %    BASOPHILS 1 Not Estab. %    ABS. NEUTROPHILS 2.6 1.4 - 7.0 x10E3/uL    Abs Lymphocytes 2.4 0.7 - 3.1 x10E3/uL    ABS. MONOCYTES 0.6 0.1 - 0.9 x10E3/uL    ABS. EOSINOPHILS 0.2 0.0 - 0.4 x10E3/uL    ABS. BASOPHILS 0.0 0.0 - 0.2 x10E3/uL    IMMATURE GRANULOCYTES 0 Not Estab. %    ABS. IMM. GRANS. 0.0 0.0 - 0.1 x10E3/uL   THYROID CASCADE PROFILE   Result Value Ref Range    TSH 1.450 0.450 - 4.500 uIU/mL   CVD REPORT   Result Value Ref Range    INTERPRETATION Note        Assessment/Plan:    ICD-10-CM ICD-9-CM    1. Essential hypertension  I10 401.9 amLODIPine-benazepril (LOTREL) 10-20 mg per capsule            Orders Placed This Encounter    amLODIPine-benazepril (LOTREL) 10-20 mg per capsule     Sig: Take 1 Capsule by mouth daily. Dispense:  90 Capsule     Refill:  1       1. Primary hypertension  Controlled on current regimen  Reiterate importance of low salt diet  - amLODIPine-benazepril (LOTREL) 10-20 mg per capsule; Take 1 Capsule by mouth daily. Dispense: 90 Capsule; Refill: 1        There are no Patient Instructions on file for this visit. Follow-up and Dispositions    Return in about 6 months (around 10/4/2023) for routine f/up.                    I have reviewed with the patient details of the assessment and plan and all questions were answered. Relevent patient education was performed. The most recent lab findings were reviewed with the patient. An After Visit Summary was printed and given to the patient.

## 2023-04-04 NOTE — PROGRESS NOTES
Zac Cochran is a 63 y.o. male  HIPAA verified by two patient identifiers. Health Maintenance Due   Topic    Hepatitis C Screening     DTaP/Tdap/Td series (1 - Tdap)    Colorectal Cancer Screening Combo     Shingles Vaccine (1 of 2)    COVID-19 Vaccine (4 - Booster for [employer] series)     Chief Complaint   Patient presents with    Hypertension     Visit Vitals  /71   Pulse 97   Temp 97.8 °F (36.6 °C) (Temporal)   Resp 18   Ht 6' (1.829 m)   Wt 265 lb (120.2 kg)   SpO2 98%   BMI 35.94 kg/m²       Pain Scale: 0 - No pain/10  Pain Location:   1. Have you been to the ER, urgent care clinic since your last visit? Hospitalized since your last visit? No    2. Have you seen or consulted any other health care providers outside of the [address] [place] [address] since your last visit? Include any pap smears or colon screening.  No

## 2023-06-22 ENCOUNTER — TELEPHONE (OUTPATIENT)
Facility: CLINIC | Age: 63
End: 2023-06-22

## 2023-06-22 NOTE — TELEPHONE ENCOUNTER
Pt called today stating he  lost his job  on May 8 and now has no insurance. He starts his new job in July but will be on 90 day probation. He wants to know if there is any other option for him to get his  Amlodipine and Chlolrthalidone. They cost $400 which he does not have. He already put in an application for medicaid . He wants a a return call please ref to this.   Pt # [phone]

## 2023-08-10 ENCOUNTER — TELEMEDICINE (OUTPATIENT)
Facility: CLINIC | Age: 63
End: 2023-08-10
Payer: MEDICAID

## 2023-08-10 DIAGNOSIS — L02.419 ABSCESS, AXILLA: Primary | ICD-10-CM

## 2023-08-10 DIAGNOSIS — I10 ESSENTIAL HYPERTENSION: ICD-10-CM

## 2023-08-10 PROCEDURE — 99213 OFFICE O/P EST LOW 20 MIN: CPT | Performed by: INTERNAL MEDICINE

## 2023-08-10 RX ORDER — IBUPROFEN 600 MG/1
600 TABLET ORAL 3 TIMES DAILY PRN
Qty: 120 TABLET | Refills: 3 | Status: SHIPPED | OUTPATIENT
Start: 2023-08-10

## 2023-08-10 RX ORDER — AMLODIPINE BESYLATE AND BENAZEPRIL HYDROCHLORIDE 10; 20 MG/1; MG/1
1 CAPSULE ORAL DAILY
Qty: 90 CAPSULE | Refills: 1 | Status: SHIPPED | OUTPATIENT
Start: 2023-08-10

## 2023-08-10 RX ORDER — ALBUTEROL SULFATE 90 UG/1
1 AEROSOL, METERED RESPIRATORY (INHALATION) EVERY 6 HOURS PRN
Qty: 18 G | Refills: 2 | Status: SHIPPED | OUTPATIENT
Start: 2023-08-10

## 2023-08-10 RX ORDER — SULFAMETHOXAZOLE AND TRIMETHOPRIM 800; 160 MG/1; MG/1
1 TABLET ORAL 2 TIMES DAILY
Qty: 10 TABLET | Refills: 0 | Status: SHIPPED | OUTPATIENT
Start: 2023-08-10 | End: 2023-08-15

## 2023-08-10 ASSESSMENT — PATIENT HEALTH QUESTIONNAIRE - PHQ9
SUM OF ALL RESPONSES TO PHQ QUESTIONS 1-9: 1
SUM OF ALL RESPONSES TO PHQ QUESTIONS 1-9: 1
2. FEELING DOWN, DEPRESSED OR HOPELESS: 1
SUM OF ALL RESPONSES TO PHQ QUESTIONS 1-9: 1
1. LITTLE INTEREST OR PLEASURE IN DOING THINGS: 0
SUM OF ALL RESPONSES TO PHQ QUESTIONS 1-9: 1
SUM OF ALL RESPONSES TO PHQ9 QUESTIONS 1 & 2: 1

## 2023-08-10 NOTE — PROGRESS NOTES
Patient identified with two identification factors, Name and Date of Birth. Chief Complaint   Patient presents with    Medication Refill    Follow-up     Boil under right armpit. No fluids present. Present for 3 days. Pt stated size smalller than dime piece        There were no vitals taken for this visit. 1. \"Have you been to the ER, urgent care clinic since your last visit? Hospitalized since your last visit? \" No     2. \"Have you seen or consulted any other health care providers outside of the [address] [place] [address] since your last visit? \" No
practitioner observation)    Blood pressure-  Heart rate-    Respiratory rate-    Temperature-  Pulse oximetry-     Constitutional: [x] Appears well-developed and well-nourished [] No apparent distress      [] Abnormal-   Mental status  [] Alert and awake  [] Oriented to person/place/time []Able to follow commands      Eyes:  EOM    [x]  Normal  [] Abnormal-  Sclera  []  Normal  [] Abnormal -         Discharge []  None visible  [] Abnormal -    HENT:   [x] Normocephalic, atraumatic. [] Abnormal   [] Mouth/Throat: Mucous membranes are moist.     External Ears [] Normal  [] Abnormal-     Neck: [x] No visualized mass     Pulmonary/Chest: [] Respiratory effort normal.  [] No visualized signs of difficulty breathing or respiratory distress        [] Abnormal-      Musculoskeletal:   [] Normal gait with no signs of ataxia         [] Normal range of motion of neck        [] Abnormal-       Neurological:        [] No Facial Asymmetry (Cranial nerve 7 motor function) (limited exam to video visit)          [] No gaze palsy        [] Abnormal-         Skin:        [] No significant exanthematous lesions or discoloration noted on facial skin         [x] Abnormal- ~3cm rt axillart sq appearing mass           Psychiatric:       [] Normal Affect [] No Hallucinations        [] Abnormal-     Other pertinent observable physical exam findings-     ASSESSMENT/PLAN:  1. Abscess, axilla  Warm compresses  Go to UC if worsen on abx  - sulfamethoxazole-trimethoprim (BACTRIM DS;SEPTRA DS) 800-160 MG per tablet; Take 1 tablet by mouth 2 times daily for 5 days  Dispense: 10 tablet; Refill: 0    2. Essential hypertension  Pt requests refill  - amLODIPine-benazepril (LOTREL) 10-20 MG per capsule; Take 1 capsule by mouth daily  Dispense: 90 capsule; Refill: 1      No follow-ups on file. Wilmar Martin, was evaluated through a synchronous (real-time) audio-video encounter.  The patient (or guardian if applicable) is aware that this is a

## 2023-09-05 DIAGNOSIS — I10 ESSENTIAL HYPERTENSION: ICD-10-CM

## 2023-09-05 NOTE — TELEPHONE ENCOUNTER
Pt  is requesting rxs to be filled and sent to [facility] on [address] [place] [address] and [address] [facility].  He needs refills for his inhaler, triamcinolone cream and Amlodipine 10-20  Pt # [phone]

## 2023-09-06 DIAGNOSIS — I10 ESSENTIAL HYPERTENSION: ICD-10-CM

## 2023-09-06 RX ORDER — TRIAMCINOLONE ACETONIDE 1 MG/G
OINTMENT TOPICAL 2 TIMES DAILY
Status: CANCELLED | OUTPATIENT
Start: 2023-09-06

## 2023-09-06 RX ORDER — AMLODIPINE BESYLATE AND BENAZEPRIL HYDROCHLORIDE 10; 20 MG/1; MG/1
1 CAPSULE ORAL DAILY
Qty: 90 CAPSULE | Refills: 1 | Status: CANCELLED | OUTPATIENT
Start: 2023-09-06

## 2023-09-06 RX ORDER — ALBUTEROL SULFATE 90 UG/1
1 AEROSOL, METERED RESPIRATORY (INHALATION) EVERY 6 HOURS PRN
Qty: 18 G | Refills: 2 | Status: CANCELLED | OUTPATIENT
Start: 2023-09-06

## 2023-09-06 NOTE — TELEPHONE ENCOUNTER
Patient switch pharmacy because of insurance now at [facility]    amLODIPine-benazepril (LOTREL) 10-20 MG per capsule    ibuprofen (ADVIL;MOTRIN) 600 MG tablet     albuterol sulfate HFA (PROVENTIL;VENTOLIN;PROAIR) 108 (90 Base) MCG/ACT inhaler    triamcinolone (KENALOG) 0.1 % ointment    omeprazole (PRILOSEC) 20 MG delayed release capsule

## 2023-09-06 NOTE — TELEPHONE ENCOUNTER
Pt states he has not had any bp medicine in 2 days and he needs refill . Also wants refill for Triamcinolone sent to [facility] pharmacy due to ins he cannot use [facility].   Pt # [phone]

## 2023-09-07 DIAGNOSIS — I10 ESSENTIAL HYPERTENSION: ICD-10-CM

## 2023-09-07 RX ORDER — ALBUTEROL SULFATE 90 UG/1
1 AEROSOL, METERED RESPIRATORY (INHALATION) EVERY 6 HOURS PRN
Qty: 18 G | Refills: 2 | Status: SHIPPED | OUTPATIENT
Start: 2023-09-07

## 2023-09-07 RX ORDER — AMLODIPINE BESYLATE AND BENAZEPRIL HYDROCHLORIDE 10; 20 MG/1; MG/1
1 CAPSULE ORAL DAILY
Qty: 90 CAPSULE | Refills: 3 | Status: SHIPPED | OUTPATIENT
Start: 2023-09-07

## 2023-09-07 RX ORDER — OMEPRAZOLE 20 MG/1
20 CAPSULE, DELAYED RELEASE ORAL DAILY PRN
Qty: 30 CAPSULE | Refills: 2 | Status: SHIPPED | OUTPATIENT
Start: 2023-09-07

## 2023-09-07 RX ORDER — IBUPROFEN 600 MG/1
600 TABLET ORAL 3 TIMES DAILY PRN
Qty: 60 TABLET | Refills: 2 | Status: SHIPPED | OUTPATIENT
Start: 2023-09-07

## 2023-09-07 RX ORDER — CHLORTHALIDONE 25 MG/1
25 TABLET ORAL DAILY
Qty: 90 TABLET | Refills: 3 | Status: SHIPPED | OUTPATIENT
Start: 2023-09-07

## 2023-10-10 ENCOUNTER — OFFICE VISIT (OUTPATIENT)
Facility: CLINIC | Age: 63
End: 2023-10-10
Payer: MEDICAID

## 2023-10-10 VITALS
RESPIRATION RATE: 16 BRPM | SYSTOLIC BLOOD PRESSURE: 110 MMHG | BODY MASS INDEX: 33.86 KG/M2 | WEIGHT: 250 LBS | OXYGEN SATURATION: 98 % | DIASTOLIC BLOOD PRESSURE: 64 MMHG | HEIGHT: 72 IN | HEART RATE: 96 BPM | TEMPERATURE: 98.1 F

## 2023-10-10 DIAGNOSIS — N52.9 ERECTILE DYSFUNCTION, UNSPECIFIED ERECTILE DYSFUNCTION TYPE: ICD-10-CM

## 2023-10-10 DIAGNOSIS — Z12.11 COLON CANCER SCREENING: ICD-10-CM

## 2023-10-10 DIAGNOSIS — Z11.59 ENCOUNTER FOR HEPATITIS C SCREENING TEST FOR LOW RISK PATIENT: ICD-10-CM

## 2023-10-10 DIAGNOSIS — Z23 ENCOUNTER FOR IMMUNIZATION: ICD-10-CM

## 2023-10-10 DIAGNOSIS — I10 PRIMARY HYPERTENSION: Primary | ICD-10-CM

## 2023-10-10 DIAGNOSIS — Z12.5 PROSTATE CANCER SCREENING: ICD-10-CM

## 2023-10-10 DIAGNOSIS — R73.9 ELEVATED BLOOD SUGAR: ICD-10-CM

## 2023-10-10 DIAGNOSIS — R20.0 BILATERAL HAND NUMBNESS: ICD-10-CM

## 2023-10-10 PROCEDURE — 90471 IMMUNIZATION ADMIN: CPT | Performed by: INTERNAL MEDICINE

## 2023-10-10 PROCEDURE — 3078F DIAST BP <80 MM HG: CPT | Performed by: INTERNAL MEDICINE

## 2023-10-10 PROCEDURE — 3074F SYST BP LT 130 MM HG: CPT | Performed by: INTERNAL MEDICINE

## 2023-10-10 PROCEDURE — 90674 CCIIV4 VAC NO PRSV 0.5 ML IM: CPT | Performed by: INTERNAL MEDICINE

## 2023-10-10 PROCEDURE — 99214 OFFICE O/P EST MOD 30 MIN: CPT | Performed by: INTERNAL MEDICINE

## 2023-10-10 RX ORDER — SILDENAFIL 100 MG/1
TABLET, FILM COATED ORAL
Qty: 30 TABLET | Refills: 3 | Status: SHIPPED | OUTPATIENT
Start: 2023-10-10

## 2023-10-10 NOTE — PROGRESS NOTES
Melania Monterroso is a 63 y.o. male and presents with   Chief Complaint   Patient presents with    Follow-up     BP       . Subjective:    Pt w multiple concerns:  -josh hand numbness  -foot numbness where he had surgery  -erectile dysfxn    PMH:   HTN-on lotrel 10/20 and chlorthalidone  BP Readings from Last 3 Encounters:   23 117/71   23 127/86   23 (!) 140/83      Covid infection and pneumonia 3/2021    PSH- appendectomy    SH-    No tob occas no heroin/cocaine    FH- father  ? ? Mother  80 natural causes   8 siblings 1 sister  aneurysm, 1 brother ESRD due to drug use    HM    Colonoscopy ? ? Immunizations  Eye care  Dental care        Review of Systems  Review of systems (12) negative, except noted above. Past Medical History:   Diagnosis Date    COVID-19 2021    Gastrointestinal disorder     GERDS    GERD (gastroesophageal reflux disease)     Hypertension     Ill-defined condition     MRSA     Past Surgical History:   Procedure Laterality Date    HX APPENDECTOMY      HX HEENT      right eye surgery    HX OTHER SURGICAL      right eye surgery    HX UROLOGICAL      laser surgery on \"my private parts\"     Social History     Socioeconomic History    Marital status:    Tobacco Use    Smoking status: Never    Smokeless tobacco: Never   Vaping Use    Vaping Use: Never used   Substance and Sexual Activity    Alcohol use: Yes     Comment: occasional    Drug use: Yes     Types: Marijuana     Comment: occasional    Sexual activity: Yes     Social Determinants of Health     Financial Resource Strain: Low Risk     Difficulty of Paying Living Expenses: Not hard at all   Food Insecurity: No Food Insecurity    Worried About Running Out of Food in the Last Year: Never true    Ran Out of Food in the Last Year: Never true     No family history on file.     Current Outpatient Medications:     sildenafil (VIAGRA) 100 MG tablet, 1/2-1 tab 15 minutes -4 hrs prior to intercourse,

## 2023-11-08 ENCOUNTER — PROCEDURE VISIT (OUTPATIENT)
Age: 63
End: 2023-11-08
Payer: MEDICAID

## 2023-11-08 DIAGNOSIS — G56.22 ULNAR NEUROPATHY OF LEFT UPPER EXTREMITY: Primary | ICD-10-CM

## 2023-11-08 DIAGNOSIS — R20.2 PARESTHESIA OF BOTH HANDS: ICD-10-CM

## 2023-11-08 PROCEDURE — 95886 MUSC TEST DONE W/N TEST COMP: CPT | Performed by: PSYCHIATRY & NEUROLOGY

## 2023-11-08 PROCEDURE — 95911 NRV CNDJ TEST 9-10 STUDIES: CPT | Performed by: PSYCHIATRY & NEUROLOGY

## 2023-11-08 NOTE — PROGRESS NOTES
[address] [facility]  [facility]  [address] [place] [facility]  Phone [phone] Fax [phone]  Test Date:  2023    Patient: Js Gabriel : 1960 Physician: Deena Prado. Lawson Moore DO   Sex: Male Height: 6' 0\" Ref Phys: Baltazar Lucero MD   ID#: [id number] Weight: 250 lbs. Technician: Radha Fritz     Patient Complaints:  Distal left upper extremity/hand paresthesias    NCV & EMG Findings:  Evaluation of the left ulnar motor nerve showed reduced amplitude (2.4 mV), decreased conduction velocity (B Elbow-Wrist, 51 m/s), and decreased conduction velocity (A Elbow-B Elbow, 30 m/s). The left ulnar sensory nerve showed no response (Wrist). All remaining nerves  were within normal limits. All F Wave latencies were within normal limits. All F Wave left vs. right side latency differences were within normal limits. Needle evaluation of the left first dorsal interosseous muscle showed increased motor unit amplitude, increased motor unit duration, and rapid recruitment. All remaining muscles (as indicated in the following table) showed no evidence of electrical instability. 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 Chronic motor axon loss changes are identified in the left first dorsal interosseous, mild in degree electrically. No evidence of a left cervical motor radiculopathy or median neuropathy at or distal to the wrist (carpal tunnel syndrome). Recommendations:  Orthopedic/Hand referral    ___________________________  Deena Prado.  Lawson Moore,         Nerve Conduction Studies  Anti Sensory Summary

## 2023-11-20 ENCOUNTER — TELEMEDICINE (OUTPATIENT)
Facility: CLINIC | Age: 63
End: 2023-11-20
Payer: MEDICAID

## 2023-11-20 DIAGNOSIS — J06.9 VIRAL UPPER RESPIRATORY TRACT INFECTION: Primary | ICD-10-CM

## 2023-11-20 PROCEDURE — 99213 OFFICE O/P EST LOW 20 MIN: CPT | Performed by: INTERNAL MEDICINE

## 2023-11-20 NOTE — PROGRESS NOTES
Chief Complaint   Patient presents with    Cough     1. Have you been to the ER, urgent care clinic since your last visit? Hospitalized since your last visit? No    2. Have you seen or consulted any other health care providers outside of the [address] [place] [address] since your last visit? Include any pap smears or colon screening.  No
negative item  -- DELETE ALL ITEMS NOT EXAMINED]  Vital Signs: (As obtained by patient/caregiver or practitioner observation)    Blood pressure-  Heart rate-    Respiratory rate-    Temperature-  Pulse oximetry-     Constitutional: [x] Appears well-developed and well-nourished [] No apparent distress      [] Abnormal-   Mental status  [x] Alert and awake  [] Oriented to person/place/time []Able to follow commands      Eyes:  EOM    [x]  Normal  [] Abnormal-  Sclera  []  Normal  [] Abnormal -         Discharge []  None visible  [] Abnormal -    HENT:   [x] Normocephalic, atraumatic. [] Abnormal   [] Mouth/Throat: Mucous membranes are moist.     External Ears [x] Normal  [] Abnormal-     Neck: [x] No visualized mass     Pulmonary/Chest: [x] Respiratory effort normal.  [] No visualized signs of difficulty breathing or respiratory distress        [] Abnormal-      Musculoskeletal:   [] Normal gait with no signs of ataxia         [] Normal range of motion of neck        [] Abnormal-       Neurological:        [] No Facial Asymmetry (Cranial nerve 7 motor function) (limited exam to video visit)          [] No gaze palsy        [] Abnormal-         Skin:        [] No significant exanthematous lesions or discoloration noted on facial skin         [] Abnormal-            Psychiatric:       [] Normal Affect [] No Hallucinations        [] Abnormal-     Other pertinent observable physical exam findings-     ASSESSMENT/PLAN:  1. Viral upper respiratory tract infection  Pt is encouraged to go to  for covid and flu testing      No follow-ups on file. Jo Ann Vázquez, was evaluated through a synchronous (real-time) audio-video encounter. The patient (or guardian if applicable) is aware that this is a billable service, which includes applicable co-pays. This Virtual Visit was conducted with patient's (and/or legal guardian's) consent. Patient identification was verified, and a caregiver was present when appropriate.    The

## 2023-12-14 RX ORDER — OMEPRAZOLE 20 MG/1
CAPSULE, DELAYED RELEASE ORAL
Qty: 30 CAPSULE | Refills: 0 | Status: SHIPPED | OUTPATIENT
Start: 2023-12-14 | End: 2023-12-15 | Stop reason: SDUPTHER

## 2023-12-14 NOTE — TELEPHONE ENCOUNTER
Pt left a voice message requesting a refill. Pt is also requesting a return call if medication cannot be refilled. [phone]    Last appointment: 11/20/2023 MD Amrit Herr   Next appointment: 12/15/2023 & 04/10/2024 MD Amrit Herr   Previous refill encounter(s):   09/07/2023 Prilosec #30 with 2 refills.      For Pharmacy Admin Tracking Only    Program: Medication Refill  Intervention Detail: New Rx: 1, reason: Patient Preference  Time Spent (min): 5      Requested Prescriptions     Pending Prescriptions Disp Refills    omeprazole (PRILOSEC) 20 MG delayed release capsule [Pharmacy Med Name: OMEPRAZOLE DR 20 MG CAPSULE] 30 capsule 0     Sig: TAKE 1 CAPSULE BY MOUTH EVERY DAY AS NEEDED FOR GERD

## 2023-12-15 ENCOUNTER — OFFICE VISIT (OUTPATIENT)
Facility: CLINIC | Age: 63
End: 2023-12-15
Payer: MEDICAID

## 2023-12-15 VITALS
HEART RATE: 76 BPM | HEIGHT: 72 IN | TEMPERATURE: 98.1 F | DIASTOLIC BLOOD PRESSURE: 79 MMHG | SYSTOLIC BLOOD PRESSURE: 118 MMHG | RESPIRATION RATE: 19 BRPM | OXYGEN SATURATION: 100 % | WEIGHT: 249.4 LBS | BODY MASS INDEX: 33.78 KG/M2

## 2023-12-15 DIAGNOSIS — I10 PRIMARY HYPERTENSION: ICD-10-CM

## 2023-12-15 DIAGNOSIS — K21.00 GASTRO-ESOPHAGEAL REFLUX DISEASE WITH ESOPHAGITIS, WITHOUT BLEEDING: ICD-10-CM

## 2023-12-15 DIAGNOSIS — G56.22 ULNAR NEUROPATHY OF LEFT UPPER EXTREMITY: Primary | ICD-10-CM

## 2023-12-15 DIAGNOSIS — L03.116 CELLULITIS OF LEFT LOWER EXTREMITY: ICD-10-CM

## 2023-12-15 PROCEDURE — 3078F DIAST BP <80 MM HG: CPT | Performed by: INTERNAL MEDICINE

## 2023-12-15 PROCEDURE — 99214 OFFICE O/P EST MOD 30 MIN: CPT | Performed by: INTERNAL MEDICINE

## 2023-12-15 PROCEDURE — 3074F SYST BP LT 130 MM HG: CPT | Performed by: INTERNAL MEDICINE

## 2023-12-15 RX ORDER — OMEPRAZOLE 20 MG/1
CAPSULE, DELAYED RELEASE ORAL
Qty: 30 CAPSULE | Refills: 2 | Status: SHIPPED | OUTPATIENT
Start: 2023-12-15

## 2023-12-15 RX ORDER — SULFAMETHOXAZOLE AND TRIMETHOPRIM 800; 160 MG/1; MG/1
1 TABLET ORAL 2 TIMES DAILY
Qty: 20 TABLET | Refills: 0 | Status: SHIPPED | OUTPATIENT
Start: 2023-12-15 | End: 2023-12-25

## 2023-12-15 NOTE — PROGRESS NOTES
Breanne Sauceda is a 63 y.o. male and presents with   Chief Complaint   Patient presents with    Cyst     Left leg        . Subjective:    Pt w c/o infection left lateral leg. Pt reports he has been scratching and itching the area    Pt LUE EMG demonstrates ulnar neuropathy    Pt request refill of his omeprazole      PMH:   HTN-on lotrel 10/20 and chlorthalidone  BP Readings from Last 3 Encounters:   12/15/23 118/79   10/10/23 110/64   23 117/71       Covid infection and pneumonia 3/2021    PSH- appendectomy    SH-    No tob occas no heroin/cocaine    FH- father  ? ? Mother  80 natural causes   8 siblings 1 sister  aneurysm, 1 brother ESRD due to drug use    HM    Colonoscopy rescheduled for March  Immunizations  Eye care  Dental care        Review of Systems  Review of systems (12) negative, except noted above. Past Medical History:   Diagnosis Date    COVID-19 2021    Gastrointestinal disorder     GERDS    GERD (gastroesophageal reflux disease)     Hypertension     Ill-defined condition     MRSA     Past Surgical History:   Procedure Laterality Date    HX APPENDECTOMY      HX HEENT      right eye surgery    HX OTHER SURGICAL      right eye surgery    HX UROLOGICAL      laser surgery on \"my private parts\"     Social History     Socioeconomic History    Marital status:    Tobacco Use    Smoking status: Never    Smokeless tobacco: Never   Vaping Use    Vaping Use: Never used   Substance and Sexual Activity    Alcohol use: Yes     Comment: occasional    Drug use: Yes     Types: Marijuana     Comment: occasional    Sexual activity: Yes     Social Determinants of Health     Financial Resource Strain: Low Risk     Difficulty of Paying Living Expenses: Not hard at all   Food Insecurity: No Food Insecurity    Worried About Running Out of Food in the Last Year: Never true    Ran Out of Food in the Last Year: Never true     No family history on file.     Current Outpatient

## 2024-02-21 ENCOUNTER — OFFICE VISIT (OUTPATIENT)
Facility: CLINIC | Age: 64
End: 2024-02-21
Payer: MEDICAID

## 2024-02-21 VITALS
BODY MASS INDEX: 33.38 KG/M2 | RESPIRATION RATE: 18 BRPM | HEART RATE: 90 BPM | OXYGEN SATURATION: 96 % | SYSTOLIC BLOOD PRESSURE: 120 MMHG | TEMPERATURE: 97.7 F | HEIGHT: 72 IN | WEIGHT: 246.4 LBS | DIASTOLIC BLOOD PRESSURE: 90 MMHG

## 2024-02-21 DIAGNOSIS — L43.9 LICHEN PLANUS: ICD-10-CM

## 2024-02-21 DIAGNOSIS — N45.4 TESTICULAR ABSCESS: Primary | ICD-10-CM

## 2024-02-21 PROCEDURE — 3080F DIAST BP >= 90 MM HG: CPT | Performed by: INTERNAL MEDICINE

## 2024-02-21 PROCEDURE — 3074F SYST BP LT 130 MM HG: CPT | Performed by: INTERNAL MEDICINE

## 2024-02-21 PROCEDURE — 99213 OFFICE O/P EST LOW 20 MIN: CPT | Performed by: INTERNAL MEDICINE

## 2024-02-21 RX ORDER — TRIAMCINOLONE ACETONIDE 1 MG/G
OINTMENT TOPICAL 2 TIMES DAILY
Qty: 80 G | Refills: 2 | Status: SHIPPED | OUTPATIENT
Start: 2024-02-21

## 2024-02-21 RX ORDER — SULFAMETHOXAZOLE AND TRIMETHOPRIM 800; 160 MG/1; MG/1
1 TABLET ORAL 2 TIMES DAILY
Qty: 20 TABLET | Refills: 0 | Status: SHIPPED | OUTPATIENT
Start: 2024-02-21 | End: 2024-03-02

## 2024-02-21 ASSESSMENT — PATIENT HEALTH QUESTIONNAIRE - PHQ9
1. LITTLE INTEREST OR PLEASURE IN DOING THINGS: 0
SUM OF ALL RESPONSES TO PHQ QUESTIONS 1-9: 0
2. FEELING DOWN, DEPRESSED OR HOPELESS: 0
SUM OF ALL RESPONSES TO PHQ QUESTIONS 1-9: 0
SUM OF ALL RESPONSES TO PHQ9 QUESTIONS 1 & 2: 0
SUM OF ALL RESPONSES TO PHQ QUESTIONS 1-9: 0
SUM OF ALL RESPONSES TO PHQ QUESTIONS 1-9: 0

## 2024-02-21 ASSESSMENT — ANXIETY QUESTIONNAIRES
5. BEING SO RESTLESS THAT IT IS HARD TO SIT STILL: 0
1. FEELING NERVOUS, ANXIOUS, OR ON EDGE: 0
IF YOU CHECKED OFF ANY PROBLEMS ON THIS QUESTIONNAIRE, HOW DIFFICULT HAVE THESE PROBLEMS MADE IT FOR YOU TO DO YOUR WORK, TAKE CARE OF THINGS AT HOME, OR GET ALONG WITH OTHER PEOPLE: NOT DIFFICULT AT ALL
7. FEELING AFRAID AS IF SOMETHING AWFUL MIGHT HAPPEN: 0
3. WORRYING TOO MUCH ABOUT DIFFERENT THINGS: 0
4. TROUBLE RELAXING: 0
2. NOT BEING ABLE TO STOP OR CONTROL WORRYING: 0
6. BECOMING EASILY ANNOYED OR IRRITABLE: 0
GAD7 TOTAL SCORE: 0

## 2024-02-21 NOTE — PROGRESS NOTES
Matthew Cortez is a 63 y.o.  male and presents with   Chief Complaint   Patient presents with    Cyst     Boil in groin area.       .  Subjective:    Pt reports he had a boil rt axillae, but it resolved w warm compresses. Now he feels one in his groin/scrotal area.    PMH:   HTN-on lotrel 10/20 and chlorthalidone  BP Readings from Last 3 Encounters:   24 (!) 120/90   12/15/23 118/79   10/10/23 110/64       Covid infection and pneumonia 3/2021    PSH- appendectomy    SH-    No tob occas no heroin/cocaine    FH- father  ??   Mother  81 natural causes   10 siblings 1 sister  aneurysm, 1 brother ESRD due to drug use    HM    Colonoscopy rescheduled for March  Immunizations  Eye care  Dental care        Review of Systems  Review of systems (12) negative, except noted above.      Past Medical History:   Diagnosis Date    COVID-19 2021    Gastrointestinal disorder     GERDS    GERD (gastroesophageal reflux disease)     Hypertension     Ill-defined condition     MRSA     Past Surgical History:   Procedure Laterality Date    HX APPENDECTOMY      HX HEENT      right eye surgery    HX OTHER SURGICAL      right eye surgery    HX UROLOGICAL      laser surgery on \"my private parts\"     Social History     Socioeconomic History    Marital status:    Tobacco Use    Smoking status: Never    Smokeless tobacco: Never   Vaping Use    Vaping Use: Never used   Substance and Sexual Activity    Alcohol use: Yes     Comment: occasional    Drug use: Yes     Types: Marijuana     Comment: occasional    Sexual activity: Yes     Social Determinants of Health     Financial Resource Strain: Low Risk     Difficulty of Paying Living Expenses: Not hard at all   Food Insecurity: No Food Insecurity    Worried About Running Out of Food in the Last Year: Never true    Ran Out of Food in the Last Year: Never true     No family history on file.    Current Outpatient Medications:     triamcinolone (KENALOG) 0.1 % ointment,

## 2024-02-21 NOTE — PROGRESS NOTES
Chief Complaint   Patient presents with    Cyst     Boil under arm     1. \"Have you been to the ER, urgent care clinic since your last visit?  Hospitalized since your last visit?\" No    2. \"Have you seen or consulted any other health care providers outside of the [facility] since your last visit?\"  No    3. For patients aged 45-75: Has the patient had a colonoscopy / FIT/ Cologuard? No      If the patient is female:    4. For patients aged 40-74: Has the patient had a mammogram within the past 2 years?       5. For patients aged 21-65: Has the patient had a pap smear?     HIPPA confirmed by two patient identifiers.

## 2024-05-30 DIAGNOSIS — K21.00 GASTRO-ESOPHAGEAL REFLUX DISEASE WITH ESOPHAGITIS, WITHOUT BLEEDING: Primary | ICD-10-CM

## 2024-05-31 RX ORDER — OMEPRAZOLE 20 MG/1
CAPSULE, DELAYED RELEASE ORAL
Qty: 30 CAPSULE | Refills: 2 | Status: SHIPPED | OUTPATIENT
Start: 2024-05-31

## 2024-07-01 ENCOUNTER — TELEPHONE (OUTPATIENT)
Facility: CLINIC | Age: 64
End: 2024-07-01

## 2024-07-01 NOTE — TELEPHONE ENCOUNTER
Patient called stating you gave him a referral in the past for eczema and dermatologist.  Patient has a spot on each leg and want to go to [facility]. Spots are not clearing up. Can you do referral or need an appointment?

## 2024-07-02 ENCOUNTER — TELEPHONE (OUTPATIENT)
Facility: CLINIC | Age: 64
End: 2024-07-02

## 2024-07-02 DIAGNOSIS — R21 SKIN RASH: Primary | ICD-10-CM

## 2024-07-02 NOTE — TELEPHONE ENCOUNTER
PSR Lianna states [facility] is not accepting any new patient at this time, and she ask patient to contact his insurance to see what providers are covered.

## 2024-07-09 ENCOUNTER — OFFICE VISIT (OUTPATIENT)
Facility: CLINIC | Age: 64
End: 2024-07-09
Payer: MEDICAID

## 2024-07-09 VITALS
TEMPERATURE: 98.2 F | SYSTOLIC BLOOD PRESSURE: 125 MMHG | RESPIRATION RATE: 19 BRPM | HEART RATE: 86 BPM | OXYGEN SATURATION: 98 % | DIASTOLIC BLOOD PRESSURE: 75 MMHG | BODY MASS INDEX: 33.62 KG/M2 | WEIGHT: 248.2 LBS | HEIGHT: 72 IN

## 2024-07-09 DIAGNOSIS — H57.9 ITCHY, WATERY, AND RED EYE: Primary | ICD-10-CM

## 2024-07-09 DIAGNOSIS — I10 PRIMARY HYPERTENSION: ICD-10-CM

## 2024-07-09 DIAGNOSIS — Z12.11 COLON CANCER SCREENING: ICD-10-CM

## 2024-07-09 PROCEDURE — 99214 OFFICE O/P EST MOD 30 MIN: CPT | Performed by: INTERNAL MEDICINE

## 2024-07-09 PROCEDURE — 3074F SYST BP LT 130 MM HG: CPT | Performed by: INTERNAL MEDICINE

## 2024-07-09 PROCEDURE — 3078F DIAST BP <80 MM HG: CPT | Performed by: INTERNAL MEDICINE

## 2024-07-09 RX ORDER — AMLODIPINE BESYLATE AND BENAZEPRIL HYDROCHLORIDE 10; 20 MG/1; MG/1
1 CAPSULE ORAL DAILY
Qty: 90 CAPSULE | Refills: 3 | Status: SHIPPED | OUTPATIENT
Start: 2024-07-09

## 2024-07-09 RX ORDER — LORATADINE 10 MG/1
10 TABLET ORAL DAILY
Qty: 30 TABLET | Refills: 1 | Status: SHIPPED | OUTPATIENT
Start: 2024-07-09

## 2024-07-09 RX ORDER — TRIAMCINOLONE ACETONIDE 1 MG/G
OINTMENT TOPICAL 2 TIMES DAILY
Qty: 80 G | Refills: 2 | Status: SHIPPED | OUTPATIENT
Start: 2024-07-09

## 2024-07-09 NOTE — PROGRESS NOTES
Chief Complaint   Patient presents with    Eye Problem     \"Have you been to the ER, urgent care clinic since your last visit?  Hospitalized since your last visit?\"    NO    “Have you seen or consulted any other health care providers outside of [facility] since your last visit?”    NO        “Have you had a colorectal cancer screening such as a colonoscopy/FIT/Cologuard?    NO    No colonoscopy on file  No cologuard on file  No FIT/FOBT on file   No flexible sigmoidoscopy on file         Click Here for Release of Records Request  HIPPA confirmed by two patient identifiers.

## 2024-07-09 NOTE — PROGRESS NOTES
Matthew Cortez is a 64 y.o.  male and presents with   Chief Complaint   Patient presents with    Eye Problem     Pt states he believe this is an allergies, his eyelids swell and his eyes turn red.       .  Subjective:    Pt needs a note for work. He was sent home due to \"red eye\". He said his eyes and ears were itchy. Feels better today. He took benadryl.    Pt has not done his labs or rescheduled colonoscopy    PMH:   HTN-on lotrel 10/20 and chlorthalidone  BP Readings from Last 3 Encounters:   24 125/75   24 (!) 120/90   12/15/23 118/79       Covid infection and pneumonia 3/2021    PSH- appendectomy    SH-    No tob, occas alcohol ,no heroin/cocaine    FH- father  ??   Mother  81 natural causes   10 siblings 1 sister  aneurysm, 1 brother ESRD due to drug use    HM    Colonoscopy ???  Immunizations  Eye care  Dental care        Review of Systems  Review of systems (12) negative, except noted above.      Past Medical History:   Diagnosis Date    COVID-19 2021    Gastrointestinal disorder     GERDS    GERD (gastroesophageal reflux disease)     Hypertension     Ill-defined condition     MRSA     Past Surgical History:   Procedure Laterality Date    HX APPENDECTOMY      HX HEENT      right eye surgery    HX OTHER SURGICAL      right eye surgery    HX UROLOGICAL      laser surgery on \"my private parts\"     Social History     Socioeconomic History    Marital status:    Tobacco Use    Smoking status: Never    Smokeless tobacco: Never   Vaping Use    Vaping Use: Never used   Substance and Sexual Activity    Alcohol use: Yes     Comment: occasional    Drug use: Yes     Types: Marijuana     Comment: occasional    Sexual activity: Yes     Social Determinants of Health     Financial Resource Strain: Low Risk     Difficulty of Paying Living Expenses: Not hard at all   Food Insecurity: No Food Insecurity    Worried About Running Out of Food in the Last Year: Never true    Ran Out of Food

## 2024-08-28 DIAGNOSIS — R73.9 ELEVATED BLOOD SUGAR: ICD-10-CM

## 2024-08-28 DIAGNOSIS — Z12.5 PROSTATE CANCER SCREENING: ICD-10-CM

## 2024-08-28 DIAGNOSIS — Z11.59 ENCOUNTER FOR HEPATITIS C SCREENING TEST FOR LOW RISK PATIENT: ICD-10-CM

## 2024-08-28 DIAGNOSIS — I10 PRIMARY HYPERTENSION: ICD-10-CM

## 2024-08-29 LAB
ALBUMIN SERPL-MCNC: 4.1 G/DL (ref 3.5–5)
ALBUMIN/GLOB SERPL: 1 (ref 1.1–2.2)
ALP SERPL-CCNC: 60 U/L (ref 45–117)
ALT SERPL-CCNC: 66 U/L (ref 12–78)
ANION GAP SERPL CALC-SCNC: 7 MMOL/L (ref 5–15)
AST SERPL-CCNC: 50 U/L (ref 15–37)
BASOPHILS # BLD: 0 K/UL (ref 0–0.1)
BASOPHILS NFR BLD: 0 % (ref 0–1)
BILIRUB SERPL-MCNC: 0.6 MG/DL (ref 0.2–1)
BUN SERPL-MCNC: 13 MG/DL (ref 6–20)
BUN/CREAT SERPL: 10 (ref 12–20)
CALCIUM SERPL-MCNC: 9.1 MG/DL (ref 8.5–10.1)
CHLORIDE SERPL-SCNC: 111 MMOL/L (ref 97–108)
CHOLEST SERPL-MCNC: 156 MG/DL
CO2 SERPL-SCNC: 22 MMOL/L (ref 21–32)
CREAT SERPL-MCNC: 1.26 MG/DL (ref 0.7–1.3)
DIFFERENTIAL METHOD BLD: ABNORMAL
EOSINOPHIL # BLD: 0.1 K/UL (ref 0–0.4)
EOSINOPHIL NFR BLD: 2 % (ref 0–7)
ERYTHROCYTE [DISTWIDTH] IN BLOOD BY AUTOMATED COUNT: 16 % (ref 11.5–14.5)
EST. AVERAGE GLUCOSE BLD GHB EST-MCNC: 105 MG/DL
GLOBULIN SER CALC-MCNC: 4.1 G/DL (ref 2–4)
GLUCOSE SERPL-MCNC: 106 MG/DL (ref 65–100)
HBA1C MFR BLD: 5.3 % (ref 4–5.6)
HCT VFR BLD AUTO: 32.3 % (ref 36.6–50.3)
HCV IGG SERPL QL IA: REACTIVE S/CO RATIO
HCV RNA SERPL NAA+PROBE-ACNC: NORMAL IU/ML
HCV RNA SERPL NAA+PROBE-ACNC: NORMAL IU/ML
HCV RNA SERPL NAA+PROBE-LOG IU: 7.19 LOG10 IU/ML
HDLC SERPL-MCNC: 34 MG/DL
HDLC SERPL: 4.6 (ref 0–5)
HGB BLD-MCNC: 10.8 G/DL (ref 12.1–17)
IMM GRANULOCYTES # BLD AUTO: 0 K/UL (ref 0–0.04)
IMM GRANULOCYTES NFR BLD AUTO: 0 % (ref 0–0.5)
INTERPRETATION: NORMAL
LDLC SERPL CALC-MCNC: 102 MG/DL (ref 0–100)
LYMPHOCYTES # BLD: 2.4 K/UL (ref 0.8–3.5)
LYMPHOCYTES NFR BLD: 39 % (ref 12–49)
MCH RBC QN AUTO: 25.2 PG (ref 26–34)
MCHC RBC AUTO-ENTMCNC: 33.4 G/DL (ref 30–36.5)
MCV RBC AUTO: 75.3 FL (ref 80–99)
MONOCYTES # BLD: 0.5 K/UL (ref 0–1)
MONOCYTES NFR BLD: 8 % (ref 5–13)
NEUTS SEG # BLD: 3.1 K/UL (ref 1.8–8)
NEUTS SEG NFR BLD: 51 % (ref 32–75)
NRBC # BLD: 0 K/UL (ref 0–0.01)
NRBC BLD-RTO: 0 PER 100 WBC
PLATELET # BLD AUTO: 179 K/UL (ref 150–400)
PMV BLD AUTO: 11 FL (ref 8.9–12.9)
POTASSIUM SERPL-SCNC: 3.8 MMOL/L (ref 3.5–5.1)
PROT SERPL-MCNC: 8.2 G/DL (ref 6.4–8.2)
PSA SERPL-MCNC: 0.6 NG/ML (ref 0.01–4)
RBC # BLD AUTO: 4.29 M/UL (ref 4.1–5.7)
REF LAB TEST REF RANGE: NORMAL
SODIUM SERPL-SCNC: 140 MMOL/L (ref 136–145)
TRIGL SERPL-MCNC: 100 MG/DL
VLDLC SERPL CALC-MCNC: 20 MG/DL
WBC # BLD AUTO: 6.1 K/UL (ref 4.1–11.1)

## 2024-09-24 ENCOUNTER — TELEPHONE (OUTPATIENT)
Facility: CLINIC | Age: 64
End: 2024-09-24

## 2024-09-24 DIAGNOSIS — B18.2 HEP C W/O COMA, CHRONIC (HCC): ICD-10-CM

## 2024-09-24 DIAGNOSIS — D64.9 ANEMIA, UNSPECIFIED TYPE: Primary | ICD-10-CM

## 2024-10-07 DIAGNOSIS — K21.00 GASTRO-ESOPHAGEAL REFLUX DISEASE WITH ESOPHAGITIS, WITHOUT BLEEDING: ICD-10-CM

## 2024-10-07 DIAGNOSIS — L03.116 CELLULITIS OF LEFT LOWER EXTREMITY: Primary | ICD-10-CM

## 2024-10-07 RX ORDER — TRIAMCINOLONE ACETONIDE 1 MG/G
OINTMENT TOPICAL 2 TIMES DAILY
Qty: 80 G | Refills: 0 | Status: SHIPPED | OUTPATIENT
Start: 2024-10-07

## 2024-10-07 RX ORDER — TRIAMCINOLONE ACETONIDE 1 MG/G
OINTMENT TOPICAL 2 TIMES DAILY
Qty: 80 G | Refills: 2 | Status: CANCELLED | OUTPATIENT
Start: 2024-10-07

## 2024-11-01 ENCOUNTER — OFFICE VISIT (OUTPATIENT)
Facility: CLINIC | Age: 64
End: 2024-11-01
Payer: MEDICAID

## 2024-11-01 VITALS
TEMPERATURE: 98.2 F | DIASTOLIC BLOOD PRESSURE: 85 MMHG | OXYGEN SATURATION: 98 % | WEIGHT: 251.2 LBS | HEART RATE: 96 BPM | SYSTOLIC BLOOD PRESSURE: 138 MMHG | RESPIRATION RATE: 18 BRPM | BODY MASS INDEX: 34.02 KG/M2 | HEIGHT: 72 IN

## 2024-11-01 DIAGNOSIS — L03.115 CELLULITIS OF RIGHT LOWER EXTREMITY: Primary | ICD-10-CM

## 2024-11-01 PROCEDURE — 99213 OFFICE O/P EST LOW 20 MIN: CPT | Performed by: INTERNAL MEDICINE

## 2024-11-01 PROCEDURE — 3079F DIAST BP 80-89 MM HG: CPT | Performed by: INTERNAL MEDICINE

## 2024-11-01 PROCEDURE — 3075F SYST BP GE 130 - 139MM HG: CPT | Performed by: INTERNAL MEDICINE

## 2024-11-01 RX ORDER — SULFAMETHOXAZOLE AND TRIMETHOPRIM 800; 160 MG/1; MG/1
1 TABLET ORAL 2 TIMES DAILY
Qty: 14 TABLET | Refills: 0 | Status: SHIPPED | OUTPATIENT
Start: 2024-11-01 | End: 2024-11-08

## 2024-11-01 SDOH — ECONOMIC STABILITY: FOOD INSECURITY: WITHIN THE PAST 12 MONTHS, THE FOOD YOU BOUGHT JUST DIDN'T LAST AND YOU DIDN'T HAVE MONEY TO GET MORE.: NEVER TRUE

## 2024-11-01 SDOH — ECONOMIC STABILITY: FOOD INSECURITY: WITHIN THE PAST 12 MONTHS, YOU WORRIED THAT YOUR FOOD WOULD RUN OUT BEFORE YOU GOT MONEY TO BUY MORE.: NEVER TRUE

## 2024-11-01 SDOH — ECONOMIC STABILITY: INCOME INSECURITY: HOW HARD IS IT FOR YOU TO PAY FOR THE VERY BASICS LIKE FOOD, HOUSING, MEDICAL CARE, AND HEATING?: NOT HARD AT ALL

## 2024-11-01 ASSESSMENT — ANXIETY QUESTIONNAIRES
2. NOT BEING ABLE TO STOP OR CONTROL WORRYING: NOT AT ALL
IF YOU CHECKED OFF ANY PROBLEMS ON THIS QUESTIONNAIRE, HOW DIFFICULT HAVE THESE PROBLEMS MADE IT FOR YOU TO DO YOUR WORK, TAKE CARE OF THINGS AT HOME, OR GET ALONG WITH OTHER PEOPLE: NOT DIFFICULT AT ALL
5. BEING SO RESTLESS THAT IT IS HARD TO SIT STILL: NOT AT ALL
7. FEELING AFRAID AS IF SOMETHING AWFUL MIGHT HAPPEN: NOT AT ALL
4. TROUBLE RELAXING: NOT AT ALL
6. BECOMING EASILY ANNOYED OR IRRITABLE: NOT AT ALL
GAD7 TOTAL SCORE: 0
3. WORRYING TOO MUCH ABOUT DIFFERENT THINGS: NOT AT ALL
1. FEELING NERVOUS, ANXIOUS, OR ON EDGE: NOT AT ALL

## 2024-11-01 ASSESSMENT — PATIENT HEALTH QUESTIONNAIRE - PHQ9
SUM OF ALL RESPONSES TO PHQ QUESTIONS 1-9: 0
SUM OF ALL RESPONSES TO PHQ QUESTIONS 1-9: 0
1. LITTLE INTEREST OR PLEASURE IN DOING THINGS: NOT AT ALL
SUM OF ALL RESPONSES TO PHQ QUESTIONS 1-9: 0
SUM OF ALL RESPONSES TO PHQ9 QUESTIONS 1 & 2: 0
SUM OF ALL RESPONSES TO PHQ QUESTIONS 1-9: 0
2. FEELING DOWN, DEPRESSED OR HOPELESS: NOT AT ALL

## 2024-11-01 NOTE — PROGRESS NOTES
Chief Complaint   Patient presents with    Boil     Patient states he have boil on his      \"Have you been to the ER, urgent care clinic since your last visit?  Hospitalized since your last visit?\"    NO    “Have you seen or consulted any other health care providers outside our system since your last visit?”    NO      “Have you had a colorectal cancer screening such as a colonoscopy/FIT/Cologuard?    NO Patient states it is scheduled for 11/08/2024    No colonoscopy on file  No cologuard on file  No FIT/FOBT on file   No flexible sigmoidoscopy on file          HIPPA confirmed by two patient identifiers.    
    sulfamethoxazole-trimethoprim (BACTRIM DS;SEPTRA DS) 800-160 MG per tablet, Take 1 tablet by mouth 2 times daily for 7 days, Disp: 14 tablet, Rfl: 0    omeprazole (PRILOSEC) 20 MG delayed release capsule, TAKE 1 CAPSULE BY MOUTH EVERY DAY AS NEEDED FOR GERD, Disp: 30 capsule, Rfl: 0    triamcinolone (KENALOG) 0.1 % ointment, Apply topically 2 times daily, Disp: 80 g, Rfl: 0    amLODIPine-benazepril (LOTREL) 10-20 MG per capsule, Take 1 capsule by mouth daily, Disp: 90 capsule, Rfl: 3    loratadine (CLARITIN) 10 MG tablet, Take 1 tablet by mouth daily, Disp: 30 tablet, Rfl: 1    ibuprofen (ADVIL;MOTRIN) 600 MG tablet, Take 1 tablet by mouth 3 times daily as needed for Pain Take with full meal, Disp: 60 tablet, Rfl: 2    albuterol sulfate HFA (PROVENTIL;VENTOLIN;PROAIR) 108 (90 Base) MCG/ACT inhaler, Inhale 1 puff into the lungs every 6 hours as needed for Wheezing, Disp: 18 g, Rfl: 2    chlorthalidone (HYGROTON) 25 MG tablet, Take 1 tablet by mouth daily, Disp: 90 tablet, Rfl: 3    No Known Allergies    Objective:  Visit Vitals  Vitals:    11/01/24 1419   BP: 138/85   Pulse: 96   Resp: 18   Temp: 98.2 °F (36.8 °C)   SpO2: 98%        Physical Exam:   General appearance - alert, well appearing, and in no distress   Mental status - alert, oriented to person, place, and time  EYE-EOMI  Neck - supple,   Chest - symmetric air entry    Abdomen - obese  Ext-no pedal edema, right midshaft redness, erthema and induration w ~ 7cm radius w papule at center  Skin-Warm and dry. no hyperpigmentation, vitiligo, or suspicious lesions  Neuro -alert, oriented, normal speech, no focal findings or movement disorder noted          Assessment/Plan:    1. Cellulitis of right lower extremity  Go to ED if worsen on med  - sulfamethoxazole-trimethoprim (BACTRIM DS;SEPTRA DS) 800-160 MG per tablet; Take 1 tablet by mouth 2 times daily for 7 days  Dispense: 14 tablet; Refill: 0      Return if symptoms worsen or fail to improve.             I

## 2024-11-04 ENCOUNTER — TELEPHONE (OUTPATIENT)
Facility: CLINIC | Age: 64
End: 2024-11-04

## 2024-11-04 NOTE — TELEPHONE ENCOUNTER
Patient called stating that his leg is still swelling and tight he started on meds Friday, , wants to know if he needs to go to ED

## 2024-11-19 DIAGNOSIS — K21.00 GASTRO-ESOPHAGEAL REFLUX DISEASE WITH ESOPHAGITIS, WITHOUT BLEEDING: ICD-10-CM

## 2024-12-13 ENCOUNTER — OFFICE VISIT (OUTPATIENT)
Age: 64
End: 2024-12-13

## 2024-12-13 VITALS
BODY MASS INDEX: 33.91 KG/M2 | TEMPERATURE: 98.2 F | SYSTOLIC BLOOD PRESSURE: 135 MMHG | WEIGHT: 250 LBS | DIASTOLIC BLOOD PRESSURE: 93 MMHG | OXYGEN SATURATION: 98 % | RESPIRATION RATE: 16 BRPM | HEART RATE: 99 BPM

## 2024-12-13 DIAGNOSIS — J45.20 MILD INTERMITTENT ASTHMA WITHOUT COMPLICATION: ICD-10-CM

## 2024-12-13 DIAGNOSIS — J01.90 ACUTE NON-RECURRENT SINUSITIS, UNSPECIFIED LOCATION: Primary | ICD-10-CM

## 2024-12-13 RX ORDER — PREDNISONE 20 MG/1
TABLET ORAL
Qty: 10 TABLET | Refills: 0 | Status: SHIPPED | OUTPATIENT
Start: 2024-12-13

## 2024-12-13 NOTE — PATIENT INSTRUCTIONS
Discussed with pt concern for bacterial infection has worsened day 5/6 and now present for 7 days, will start antibiotic. Should take medicine with food to help less GI symptoms (nausea, vomiting, stomach pain or stool changes). Also discussed can use daily steroid for next 5 days to help with coughing/chest tightness. Also reviewed using albuterol inhaler every 4 hours while awake next 3 days and then every 4-6 hours as needed for coughing, SOB or wheezing after first 3 days. Make sure to drink plenty of fluids and rest/sleep. If no improvement in next few days or any worsening symptoms please return to urgent care or emergency department.

## 2024-12-13 NOTE — PROGRESS NOTES
Matthew Cortez (:  1960) is a 64 y.o. male,New patient, here for evaluation of the following chief complaint(s):  Cold Symptoms (X 7 days, worse last 24 hours)          ASSESSMENT/PLAN:    Assessment & Plan  Acute non-recurrent sinusitis, unspecified location            Mild intermittent asthma without complication          Discussed with pt concern for bacterial infection has worsened day 5/6 and now present for 7 days, will start antibiotic. Should take medicine with food to help less GI symptoms (nausea, vomiting, stomach pain or stool changes). Also discussed can use daily steroid for next 5 days to help with coughing/chest tightness. Also reviewed using albuterol inhaler every 4 hours while awake next 3 days and then every 4-6 hours as needed for coughing, SOB or wheezing after first 3 days. Make sure to drink plenty of fluids and rest/sleep. If no improvement in next few days or any worsening symptoms please return to urgent care or emergency department.        I have discussed the results, diagnosis and treatment plan with the patient. The patient also understands that early in the process of an illness, an urgent care workup can be falsely reassuring. Routine discharge counseling and specific return precautions discussed with patient and the patient understands that worsening, changing or persistent symptoms should prompt an immediate return to the urgent care or emergency department. Patient/Guardian expressed understanding and agrees with the discharge plan. No further questions at time of discharge.     SUBJECTIVE/OBJECTIVE:  64 y.o. male presents with symptoms of Cold Symptoms (X 7 days, worse last 24 hours)      65 yo male presents to  stating he has nasal congestion, runny nose and coughing last 7 days, but seems to worsen the last 2 days. Pt has hx of asthma, has not been using inhaler prescribed but patient states couhging is hurting chest. Denies fevers/chills/sick contacts/loss of taste

## 2024-12-26 RX ORDER — LORATADINE 10 MG/1
10 TABLET ORAL DAILY
Qty: 30 TABLET | Refills: 1 | Status: SHIPPED | OUTPATIENT
Start: 2024-12-26

## 2024-12-26 RX ORDER — ALBUTEROL SULFATE 90 UG/1
1 INHALANT RESPIRATORY (INHALATION) EVERY 6 HOURS PRN
Qty: 18 EACH | Refills: 2 | Status: SHIPPED | OUTPATIENT
Start: 2024-12-26

## 2024-12-26 RX ORDER — CHLORTHALIDONE 25 MG/1
25 TABLET ORAL DAILY
Qty: 30 TABLET | Refills: 1 | Status: SHIPPED | OUTPATIENT
Start: 2024-12-26

## 2025-02-19 DIAGNOSIS — K21.00 GASTRO-ESOPHAGEAL REFLUX DISEASE WITH ESOPHAGITIS, WITHOUT BLEEDING: ICD-10-CM

## 2025-02-19 RX ORDER — OMEPRAZOLE 20 MG/1
CAPSULE, DELAYED RELEASE ORAL
Qty: 30 CAPSULE | Refills: 2 | Status: SHIPPED | OUTPATIENT
Start: 2025-02-19

## 2025-02-24 RX ORDER — TRIAMCINOLONE ACETONIDE 1 MG/G
OINTMENT TOPICAL 2 TIMES DAILY
Qty: 15 G | Refills: 1 | Status: SHIPPED | OUTPATIENT
Start: 2025-02-24

## 2025-02-27 ENCOUNTER — OFFICE VISIT (OUTPATIENT)
Age: 65
End: 2025-02-27
Payer: MEDICAID

## 2025-02-27 VITALS
OXYGEN SATURATION: 98 % | TEMPERATURE: 98.2 F | SYSTOLIC BLOOD PRESSURE: 169 MMHG | DIASTOLIC BLOOD PRESSURE: 95 MMHG | WEIGHT: 272.4 LBS | BODY MASS INDEX: 36.9 KG/M2 | HEIGHT: 72 IN | HEART RATE: 64 BPM

## 2025-02-27 DIAGNOSIS — B18.2 CHRONIC HEPATITIS C WITHOUT HEPATIC COMA (HCC): Primary | ICD-10-CM

## 2025-02-27 PROBLEM — Z86.16 HISTORY OF COVID-19: Status: ACTIVE | Noted: 2021-03-17

## 2025-02-27 PROCEDURE — 91200 LIVER ELASTOGRAPHY: CPT | Performed by: INTERNAL MEDICINE

## 2025-02-27 PROCEDURE — 99204 OFFICE O/P NEW MOD 45 MIN: CPT | Performed by: INTERNAL MEDICINE

## 2025-02-27 ASSESSMENT — PATIENT HEALTH QUESTIONNAIRE - PHQ9
1. LITTLE INTEREST OR PLEASURE IN DOING THINGS: NOT AT ALL
DEPRESSION UNABLE TO ASSESS: FUNCTIONAL CAPACITY MOTIVATION LIMITS ACCURACY
2. FEELING DOWN, DEPRESSED OR HOPELESS: NOT AT ALL
SUM OF ALL RESPONSES TO PHQ QUESTIONS 1-9: 0

## 2025-02-27 ASSESSMENT — ANXIETY QUESTIONNAIRES
3. WORRYING TOO MUCH ABOUT DIFFERENT THINGS: NOT AT ALL
5. BEING SO RESTLESS THAT IT IS HARD TO SIT STILL: NOT AT ALL
4. TROUBLE RELAXING: NOT AT ALL
IF YOU CHECKED OFF ANY PROBLEMS ON THIS QUESTIONNAIRE, HOW DIFFICULT HAVE THESE PROBLEMS MADE IT FOR YOU TO DO YOUR WORK, TAKE CARE OF THINGS AT HOME, OR GET ALONG WITH OTHER PEOPLE: NOT DIFFICULT AT ALL
2. NOT BEING ABLE TO STOP OR CONTROL WORRYING: NOT AT ALL
7. FEELING AFRAID AS IF SOMETHING AWFUL MIGHT HAPPEN: NOT AT ALL
GAD7 TOTAL SCORE: 0
1. FEELING NERVOUS, ANXIOUS, OR ON EDGE: NOT AT ALL
6. BECOMING EASILY ANNOYED OR IRRITABLE: NOT AT ALL

## 2025-02-27 NOTE — PROGRESS NOTES
Chief Complaint   Patient presents with    New Patient     Vitals:    02/27/25 1036   BP: (!) 169/95   Pulse:    Temp:    SpO2:      \"Have you been to the ER, urgent care clinic since your last visit?  Hospitalized since your last visit?\"    NO    “Have you seen or consulted any other health care providers outside our system since your last visit?”    NO             
MG/DL  14    Creat 0.70 - 1.30 MG/DL  1.24    Na 136 - 145 mmol/L  141    K 3.5 - 5.1 mmol/L  4.2    Cl 97 - 108 mmol/L  110 (H)    CO2 21 - 32 mmol/L  26    Glucose 65 - 100 mg/dL  101 (H)         SEROLOGIES:   Latest Ref Rng 8/28/2024 3/3/2025   [facility] - [facility]      Hep B Surface Ag Index  0.17    Hep B Core Ab, Total Negative    Negative    Hep B Surface Ab mIU/mL  <3.10    Hep B S Ab Interp NONREACTIVE    NONREACTIVE    Hep C RNA Scar IU/mL  7,720,000    Hep C RNA Scar Log log10 IU/mL 7.188  6.888    Hep C Ab Non Reactive s/co ratio Reactive !     Hep C Genotype    1a    Hep C Genotype    Comment         LIVER HISTOLOGY:  2/2025.  FibroScan performed at [facility]. EkPa was 11.2.  IQR/med 31%.  .  The results suggested a fibrosis level of F2-3.  The CAP score suggests there may be hepatic steatosis.      ENDOSCOPIC PROCEDURES:  Not available or performed    RADIOLOGY:  Not available or performed    OTHER TESTING:  Not available or performed    FOLLOW-UP:  All of the issues listed above in the Assessment and Plan were discussed with the patient.  All questions were answered.  The patient expressed a clear understanding of the above.    Follow-up [facility] in 7 months to assess response to HCV treatment, which will include Fibroscan.      Mark Soliman MD  [address], [address]  [place]  [address] [phone]  [facility]

## 2025-03-03 ENCOUNTER — OFFICE VISIT (OUTPATIENT)
Facility: CLINIC | Age: 65
End: 2025-03-03

## 2025-03-03 ENCOUNTER — TELEPHONE (OUTPATIENT)
Age: 65
End: 2025-03-03

## 2025-03-03 VITALS
HEART RATE: 91 BPM | OXYGEN SATURATION: 100 % | HEIGHT: 72 IN | BODY MASS INDEX: 36.75 KG/M2 | SYSTOLIC BLOOD PRESSURE: 128 MMHG | TEMPERATURE: 98.3 F | DIASTOLIC BLOOD PRESSURE: 84 MMHG | RESPIRATION RATE: 18 BRPM | WEIGHT: 271.3 LBS

## 2025-03-03 DIAGNOSIS — B18.2 CHRONIC HEPATITIS C WITHOUT HEPATIC COMA (HCC): ICD-10-CM

## 2025-03-03 DIAGNOSIS — Z23 ENCOUNTER FOR IMMUNIZATION: ICD-10-CM

## 2025-03-03 DIAGNOSIS — I10 PRIMARY HYPERTENSION: Primary | ICD-10-CM

## 2025-03-03 DIAGNOSIS — B18.2 CHRONIC HEPATITIS C WITHOUT HEPATIC COMA (HCC): Primary | ICD-10-CM

## 2025-03-03 RX ORDER — ALBUTEROL SULFATE 90 UG/1
1 INHALANT RESPIRATORY (INHALATION) EVERY 6 HOURS PRN
Qty: 18 EACH | Refills: 2 | Status: SHIPPED | OUTPATIENT
Start: 2025-03-03

## 2025-03-03 RX ORDER — IBUPROFEN 600 MG/1
600 TABLET, FILM COATED ORAL 3 TIMES DAILY PRN
Qty: 60 TABLET | Refills: 2 | Status: SHIPPED | OUTPATIENT
Start: 2025-03-03

## 2025-03-03 RX ORDER — CHLORTHALIDONE 25 MG/1
25 TABLET ORAL DAILY
Qty: 90 TABLET | Refills: 3 | Status: SHIPPED | OUTPATIENT
Start: 2025-03-03

## 2025-03-03 SDOH — ECONOMIC STABILITY: FOOD INSECURITY: WITHIN THE PAST 12 MONTHS, YOU WORRIED THAT YOUR FOOD WOULD RUN OUT BEFORE YOU GOT MONEY TO BUY MORE.: NEVER TRUE

## 2025-03-03 SDOH — ECONOMIC STABILITY: FOOD INSECURITY: WITHIN THE PAST 12 MONTHS, THE FOOD YOU BOUGHT JUST DIDN'T LAST AND YOU DIDN'T HAVE MONEY TO GET MORE.: NEVER TRUE

## 2025-03-03 ASSESSMENT — PATIENT HEALTH QUESTIONNAIRE - PHQ9
SUM OF ALL RESPONSES TO PHQ QUESTIONS 1-9: 0
1. LITTLE INTEREST OR PLEASURE IN DOING THINGS: NOT AT ALL
2. FEELING DOWN, DEPRESSED OR HOPELESS: NOT AT ALL
SUM OF ALL RESPONSES TO PHQ QUESTIONS 1-9: 0

## 2025-03-03 ASSESSMENT — ANXIETY QUESTIONNAIRES
5. BEING SO RESTLESS THAT IT IS HARD TO SIT STILL: NOT AT ALL
6. BECOMING EASILY ANNOYED OR IRRITABLE: NOT AT ALL
IF YOU CHECKED OFF ANY PROBLEMS ON THIS QUESTIONNAIRE, HOW DIFFICULT HAVE THESE PROBLEMS MADE IT FOR YOU TO DO YOUR WORK, TAKE CARE OF THINGS AT HOME, OR GET ALONG WITH OTHER PEOPLE: NOT DIFFICULT AT ALL
7. FEELING AFRAID AS IF SOMETHING AWFUL MIGHT HAPPEN: NOT AT ALL
1. FEELING NERVOUS, ANXIOUS, OR ON EDGE: NOT AT ALL
3. WORRYING TOO MUCH ABOUT DIFFERENT THINGS: NOT AT ALL
4. TROUBLE RELAXING: NOT AT ALL
GAD7 TOTAL SCORE: 0
2. NOT BEING ABLE TO STOP OR CONTROL WORRYING: NOT AT ALL

## 2025-03-03 NOTE — PROGRESS NOTES
Chief Complaint   Patient presents with    Boil     Patient states he have a boil  under his right arm.     \"Have you been to the ER, urgent care clinic since your last visit?  Hospitalized since your last visit?\"    NO    “Have you seen or consulted any other health care providers outside our system since your last visit?”    NO           HIPPA confirmed by two patient identifiers.    
hepatology    3. Encounter for immunization  Administered  - Influenza, FLUCELVAX Trivalent, (age 6 mo+) IM, Preservative Free, 0.5mL        No follow-ups on file.             I have reviewed with the patient details of the assessment and plan and all questions were answered. Relevent patient education was performed.The most recent lab findings were reviewed with the patient.    An After Visit Summary was printed and given to the patient.

## 2025-03-03 NOTE — TELEPHONE ENCOUNTER
----- Message from Dr. Mark Soliman MD sent at 3/2/2025 10:17 AM EST -----  Regarding: FW: Lab Notification: Unable to obtain samples  Please reorder the labs under my name as verbal order.  Call and arrange for him to come in or go to lab coprp to get these.  MLS      3/3/25@0857 Lab re-ordered. Patient at [facility] now getting labs done. Patient will need medication now. Will let  know to order meds. (KF)      ----- Message -----  From: Katherin Andres  Sent: 2025   3:53 PM EST  To: Mark Soliman MD  Subject: FW: Lab Notification: Unable to obtain sampl#    Please see below.  ----- Message -----  From: Radha Reardon  Sent: 2025  12:30 PM EST  To: [facility] Client Services Staff Pool; #  Subject: Lab Notification: Unable to obtain samples       We have been notified that samples could not be obtained for this patient's most recent lab work.  Please place new orders prior to the patient's return.    If additional assistance is required, please contact Client Services at [phone].    Patient:  Matthew Cortez  :  1960  Ordering Provider:  NEIL Soliman MD    Thank you,    [facility] Client Services

## 2025-03-05 ENCOUNTER — TELEPHONE (OUTPATIENT)
Age: 65
End: 2025-03-05

## 2025-03-05 DIAGNOSIS — B18.2 CHRONIC HEPATITIS C WITHOUT HEPATIC COMA (HCC): Primary | ICD-10-CM

## 2025-03-05 LAB
ALBUMIN SERPL-MCNC: 4 G/DL (ref 3.5–5)
ALBUMIN/GLOB SERPL: 0.9 (ref 1.1–2.2)
ALP SERPL-CCNC: 93 U/L (ref 45–117)
ALT SERPL-CCNC: 108 U/L (ref 12–78)
AST SERPL-CCNC: 91 U/L (ref 15–37)
BILIRUB DIRECT SERPL-MCNC: 0.2 MG/DL (ref 0–0.2)
BILIRUB SERPL-MCNC: 0.4 MG/DL (ref 0.2–1)
GLOBULIN SER CALC-MCNC: 4.5 G/DL (ref 2–4)
HBV SURFACE AB SER QL: NONREACTIVE
HBV SURFACE AB SER-ACNC: <3.1 MIU/ML
HBV SURFACE AG SER QL: 0.17 INDEX
HBV SURFACE AG SER QL: NEGATIVE
PROT SERPL-MCNC: 8.5 G/DL (ref 6.4–8.2)

## 2025-03-05 NOTE — TELEPHONE ENCOUNTER
----- Message from Radha AMOS sent at 3/5/2025  3:51 PM EST -----  Regarding: Lab Specimen Problem  Hello,     Please see information below for details regarding a problem with samples received at [facility]:    Patient Name:  Matthew Cortez  Patient :  1960  Test(s) Affected:  CBC & BMP  Description of Problem:  Lab error led to samples being beyond stability for analysis    Please place a new order and Client Services will contact the patient for recollection.    If you have any questions, please call [phone] and a representative will assist you.    Thank you,    [facility] Client Services      3/5/25@0707 Order placed. (KF)

## 2025-03-07 ENCOUNTER — TELEPHONE (OUTPATIENT)
Age: 65
End: 2025-03-07

## 2025-03-07 DIAGNOSIS — B18.2 CHRONIC HEPATITIS C WITHOUT HEPATIC COMA (HCC): ICD-10-CM

## 2025-03-07 LAB
HBV CORE AB SERPL QL IA: NEGATIVE
HCV GENTYP SERPL NAA+PROBE: NORMAL
LABORATORY COMMENT REPORT: NORMAL

## 2025-03-07 NOTE — TELEPHONE ENCOUNTER
Patient called to notify provider that he got the labs done again.  There was a specimen error via the lab.

## 2025-03-08 LAB
ANION GAP SERPL CALC-SCNC: 5 MMOL/L (ref 2–12)
BASOPHILS # BLD: 0.04 K/UL (ref 0–0.1)
BASOPHILS NFR BLD: 0.5 % (ref 0–1)
BUN SERPL-MCNC: 14 MG/DL (ref 6–20)
BUN/CREAT SERPL: 11 (ref 12–20)
CALCIUM SERPL-MCNC: 9.2 MG/DL (ref 8.5–10.1)
CHLORIDE SERPL-SCNC: 110 MMOL/L (ref 97–108)
CO2 SERPL-SCNC: 26 MMOL/L (ref 21–32)
CREAT SERPL-MCNC: 1.24 MG/DL (ref 0.7–1.3)
DIFFERENTIAL METHOD BLD: ABNORMAL
EOSINOPHIL # BLD: 0.49 K/UL (ref 0–0.4)
EOSINOPHIL NFR BLD: 6.1 % (ref 0–7)
ERYTHROCYTE [DISTWIDTH] IN BLOOD BY AUTOMATED COUNT: 18.6 % (ref 11.5–14.5)
GLUCOSE SERPL-MCNC: 101 MG/DL (ref 65–100)
HCT VFR BLD AUTO: 30.5 % (ref 36.6–50.3)
HGB BLD-MCNC: 9.3 G/DL (ref 12.1–17)
IMM GRANULOCYTES # BLD AUTO: 0.02 K/UL (ref 0–0.04)
IMM GRANULOCYTES NFR BLD AUTO: 0.2 % (ref 0–0.5)
LYMPHOCYTES # BLD: 2.54 K/UL (ref 0.8–3.5)
LYMPHOCYTES NFR BLD: 31.8 % (ref 12–49)
MCH RBC QN AUTO: 21.2 PG (ref 26–34)
MCHC RBC AUTO-ENTMCNC: 30.5 G/DL (ref 30–36.5)
MCV RBC AUTO: 69.5 FL (ref 80–99)
MONOCYTES # BLD: 0.84 K/UL (ref 0–1)
MONOCYTES NFR BLD: 10.5 % (ref 5–13)
NEUTS SEG # BLD: 4.07 K/UL (ref 1.8–8)
NEUTS SEG NFR BLD: 50.9 % (ref 32–75)
NRBC # BLD: 0 K/UL (ref 0–0.01)
NRBC BLD-RTO: 0 PER 100 WBC
PLATELET # BLD AUTO: 246 K/UL (ref 150–400)
POTASSIUM SERPL-SCNC: 4.2 MMOL/L (ref 3.5–5.1)
RBC # BLD AUTO: 4.39 M/UL (ref 4.1–5.7)
RBC MORPH BLD: ABNORMAL
SODIUM SERPL-SCNC: 141 MMOL/L (ref 136–145)
WBC # BLD AUTO: 8 K/UL (ref 4.1–11.1)

## 2025-03-09 LAB
HCV GENTYP SERPL NAA+PROBE: NORMAL
HCV GENTYP SERPL NAA+PROBE: NORMAL
HCV RNA SERPL NAA+PROBE-ACNC: NORMAL IU/ML
HCV RNA SERPL NAA+PROBE-LOG IU: 6.89 LOG10 IU/ML
LABORATORY COMMENT REPORT: NORMAL

## 2025-03-09 RX ORDER — VELPATASVIR AND SOFOSBUVIR 100; 400 MG/1; MG/1
1 TABLET, FILM COATED ORAL DAILY
Qty: 28 TABLET | Refills: 2 | Status: SHIPPED | OUTPATIENT
Start: 2025-03-09

## 2025-03-10 ENCOUNTER — RESULTS FOLLOW-UP (OUTPATIENT)
Age: 65
End: 2025-03-10

## 2025-03-10 ENCOUNTER — TELEPHONE (OUTPATIENT)
Age: 65
End: 2025-03-10

## 2025-03-10 NOTE — TELEPHONE ENCOUNTER
----- Message from Dr. Mark Soliman MD sent at 3/9/2025  9:49 AM EDT -----  Regarding: RE: Lab Notification: Unable to obtain samples  Note done.  Meds ordered.  Aksi if he is still taking prednsione.  If so tell him to stop it.    MLS      3/10/258@1018 Spoke w/patient above message relayed and patient verbalize understand now he is now waiting on medication. (KF)    ----- Message -----  From: Tracee Torrez RN  Sent: 3/3/2025   9:02 AM EDT  To: Mark Soliman MD  Subject: RE: Lab Notification: Unable to obtain sampl#    Labs being done as we speak. Medication need to be ordered now. Modesto Easley  ----- Message -----  From: Mark Soliman MD  Sent: 3/2/2025  10:18 AM EST  To: Tracee Torrez RN  Subject: FW: Lab Notification: Unable to obtain sampl#    Please reorder the labs under my name as verbal order.  Call and arrange for him to come in or go to lab coprp to get these.  MLS  ----- Message -----  From: Katherin Andres  Sent: 2025   3:53 PM EST  To: Mark Soliman MD  Subject: FW: Lab Notification: Unable to obtain sampl#    Please see below.  ----- Message -----  From: Radha Reardon  Sent: 2025  12:30 PM EST  To: [facility] Client Services Staff Pool; #  Subject: Lab Notification: Unable to obtain samples       We have been notified that samples could not be obtained for this patient's most recent lab work.  Please place new orders prior to the patient's return.    If additional assistance is required, please contact Client Services at [phone].    Patient:  Matthew Cortez  :  1960  Ordering Provider:  NEIL Soliman MD    Thank you,    [facility] Client Services

## 2025-06-20 RX ORDER — TRIAMCINOLONE ACETONIDE 1 MG/G
OINTMENT TOPICAL 2 TIMES DAILY
Qty: 30 G | Refills: 1 | Status: SHIPPED | OUTPATIENT
Start: 2025-06-20

## 2025-06-30 ENCOUNTER — HOSPITAL ENCOUNTER (EMERGENCY)
Facility: HOSPITAL | Age: 65
Discharge: HOME | End: 2025-06-30
Payer: MEDICAID

## 2025-06-30 ENCOUNTER — APPOINTMENT (OUTPATIENT)
Facility: HOSPITAL | Age: 65
End: 2025-06-30
Payer: MEDICAID

## 2025-06-30 VITALS
OXYGEN SATURATION: 98 % | BODY MASS INDEX: 34.54 KG/M2 | SYSTOLIC BLOOD PRESSURE: 151 MMHG | RESPIRATION RATE: 18 BRPM | WEIGHT: 255 LBS | TEMPERATURE: 97.8 F | DIASTOLIC BLOOD PRESSURE: 90 MMHG | HEART RATE: 91 BPM | HEIGHT: 72 IN

## 2025-06-30 DIAGNOSIS — R19.7 DIARRHEA, UNSPECIFIED TYPE: ICD-10-CM

## 2025-06-30 DIAGNOSIS — R42 INTERMITTENT LIGHTHEADEDNESS: Primary | ICD-10-CM

## 2025-06-30 LAB
ALBUMIN SERPL-MCNC: 3.7 G/DL (ref 3.5–5)
ALBUMIN/GLOB SERPL: 0.9 (ref 1.1–2.2)
ALP SERPL-CCNC: 66 U/L (ref 45–117)
ALT SERPL-CCNC: 24 U/L (ref 12–78)
ANION GAP SERPL CALC-SCNC: 7 MMOL/L (ref 2–12)
AST SERPL-CCNC: 25 U/L (ref 15–37)
BASOPHILS # BLD: 0.03 K/UL (ref 0–0.1)
BASOPHILS NFR BLD: 0.4 % (ref 0–1)
BILIRUB SERPL-MCNC: 0.3 MG/DL (ref 0.2–1)
BUN SERPL-MCNC: 13 MG/DL (ref 6–20)
BUN/CREAT SERPL: 10 (ref 12–20)
CALCIUM SERPL-MCNC: 8.9 MG/DL (ref 8.5–10.1)
CHLORIDE SERPL-SCNC: 104 MMOL/L (ref 97–108)
CO2 SERPL-SCNC: 26 MMOL/L (ref 21–32)
CREAT SERPL-MCNC: 1.3 MG/DL (ref 0.7–1.3)
DIFFERENTIAL METHOD BLD: ABNORMAL
EKG ATRIAL RATE: 94 BPM
EKG DIAGNOSIS: NORMAL
EKG P AXIS: 45 DEGREES
EKG P-R INTERVAL: 192 MS
EKG Q-T INTERVAL: 352 MS
EKG QRS DURATION: 72 MS
EKG QTC CALCULATION (BAZETT): 440 MS
EKG R AXIS: -7 DEGREES
EKG T AXIS: 46 DEGREES
EKG VENTRICULAR RATE: 94 BPM
EOSINOPHIL # BLD: 0.15 K/UL (ref 0–0.4)
EOSINOPHIL NFR BLD: 2.2 % (ref 0–7)
ERYTHROCYTE [DISTWIDTH] IN BLOOD BY AUTOMATED COUNT: 19.5 % (ref 11.5–14.5)
GLOBULIN SER CALC-MCNC: 4.1 G/DL (ref 2–4)
GLUCOSE SERPL-MCNC: 109 MG/DL (ref 65–100)
HCT VFR BLD AUTO: 29.5 % (ref 36.6–50.3)
HGB BLD-MCNC: 9.3 G/DL (ref 12.1–17)
IMM GRANULOCYTES # BLD AUTO: 0.02 K/UL (ref 0–0.04)
IMM GRANULOCYTES NFR BLD AUTO: 0.3 % (ref 0–0.5)
LYMPHOCYTES # BLD: 2.17 K/UL (ref 0.8–3.5)
LYMPHOCYTES NFR BLD: 31.5 % (ref 12–49)
MCH RBC QN AUTO: 21.3 PG (ref 26–34)
MCHC RBC AUTO-ENTMCNC: 31.5 G/DL (ref 30–36.5)
MCV RBC AUTO: 67.7 FL (ref 80–99)
MONOCYTES # BLD: 0.77 K/UL (ref 0–1)
MONOCYTES NFR BLD: 11.2 % (ref 5–13)
NEUTS SEG # BLD: 3.76 K/UL (ref 1.8–8)
NEUTS SEG NFR BLD: 54.4 % (ref 32–75)
NRBC # BLD: 0.02 K/UL (ref 0–0.01)
NRBC BLD-RTO: 0.3 PER 100 WBC
PLATELET # BLD AUTO: 215 K/UL (ref 150–400)
PMV BLD AUTO: 10 FL (ref 8.9–12.9)
POTASSIUM SERPL-SCNC: 4.1 MMOL/L (ref 3.5–5.1)
PROT SERPL-MCNC: 7.8 G/DL (ref 6.4–8.2)
RBC # BLD AUTO: 4.36 M/UL (ref 4.1–5.7)
RBC MORPH BLD: ABNORMAL
RBC MORPH BLD: ABNORMAL
SODIUM SERPL-SCNC: 137 MMOL/L (ref 136–145)
TROPONIN I SERPL HS-MCNC: 6 NG/L (ref 0–76)
WBC # BLD AUTO: 6.9 K/UL (ref 4.1–11.1)

## 2025-06-30 PROCEDURE — 96361 HYDRATE IV INFUSION ADD-ON: CPT

## 2025-06-30 PROCEDURE — 36415 COLL VENOUS BLD VENIPUNCTURE: CPT

## 2025-06-30 PROCEDURE — 71045 X-RAY EXAM CHEST 1 VIEW: CPT

## 2025-06-30 PROCEDURE — 93005 ELECTROCARDIOGRAM TRACING: CPT

## 2025-06-30 PROCEDURE — 96374 THER/PROPH/DIAG INJ IV PUSH: CPT

## 2025-06-30 PROCEDURE — 80053 COMPREHEN METABOLIC PANEL: CPT

## 2025-06-30 PROCEDURE — 84484 ASSAY OF TROPONIN QUANT: CPT

## 2025-06-30 PROCEDURE — 2580000003 HC RX 258

## 2025-06-30 PROCEDURE — 99285 EMERGENCY DEPT VISIT HI MDM: CPT

## 2025-06-30 PROCEDURE — 6360000002 HC RX W HCPCS

## 2025-06-30 PROCEDURE — 85025 COMPLETE CBC W/AUTO DIFF WBC: CPT

## 2025-06-30 RX ORDER — ONDANSETRON 2 MG/ML
4 INJECTION INTRAMUSCULAR; INTRAVENOUS ONCE
Status: COMPLETED | OUTPATIENT
Start: 2025-06-30 | End: 2025-06-30

## 2025-06-30 RX ORDER — 0.9 % SODIUM CHLORIDE 0.9 %
1000 INTRAVENOUS SOLUTION INTRAVENOUS ONCE
Status: COMPLETED | OUTPATIENT
Start: 2025-06-30 | End: 2025-06-30

## 2025-06-30 RX ORDER — ONDANSETRON 4 MG/1
4 TABLET, FILM COATED ORAL EVERY 8 HOURS PRN
Qty: 15 TABLET | Refills: 0 | Status: SHIPPED | OUTPATIENT
Start: 2025-06-30 | End: 2025-07-05

## 2025-06-30 RX ORDER — HYDROCODONE BITARTRATE AND ACETAMINOPHEN 5; 325 MG/1; MG/1
1 TABLET ORAL
Refills: 0 | Status: DISCONTINUED | OUTPATIENT
Start: 2025-06-30 | End: 2025-06-30

## 2025-06-30 RX ADMIN — SODIUM CHLORIDE 1000 ML: 0.9 INJECTION, SOLUTION INTRAVENOUS at 13:04

## 2025-06-30 RX ADMIN — ONDANSETRON 4 MG: 2 INJECTION, SOLUTION INTRAMUSCULAR; INTRAVENOUS at 13:07

## 2025-06-30 ASSESSMENT — LIFESTYLE VARIABLES
HOW MANY STANDARD DRINKS CONTAINING ALCOHOL DO YOU HAVE ON A TYPICAL DAY: 3 OR 4
HOW OFTEN DO YOU HAVE A DRINK CONTAINING ALCOHOL: MONTHLY OR LESS

## 2025-06-30 ASSESSMENT — PAIN SCALES - GENERAL: PAINLEVEL_OUTOF10: 7

## 2025-06-30 ASSESSMENT — PAIN - FUNCTIONAL ASSESSMENT: PAIN_FUNCTIONAL_ASSESSMENT: 0-10

## 2025-06-30 NOTE — ED TRIAGE NOTES
Pt arrives ambulatory with cc of dizziness, N/V, headaches.  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

## 2025-06-30 NOTE — ED NOTES
Discharge instructions were given to the patient by CHER Graff.  The patient left the Emergency Department alert and oriented and in no acute distress with 1 prescription(s). The patient was encouraged to call or return to the ED for worsening issues or problems and was encouraged to schedule a follow up appointment for continuing care.  The patient verbalized understanding of discharge instructions and prescriptions; all questions were answered. The patient has no further concerns at this time.

## 2025-06-30 NOTE — ED PROVIDER NOTES
ml/min/1.73m2    Calcium 8.9 8.5 - 10.1 MG/DL    Total Bilirubin 0.3 0.2 - 1.0 MG/DL    ALT 24 12 - 78 U/L    AST 25 15 - 37 U/L    Alk Phosphatase 66 45 - 117 U/L    Total Protein 7.8 6.4 - 8.2 g/dL    Albumin 3.7 3.5 - 5.0 g/dL    Globulin 4.1 (H) 2.0 - 4.0 g/dL    Albumin/Globulin Ratio 0.9 (L) 1.1 - 2.2     Troponin    Collection Time: 06/30/25  1:01 PM   Result Value Ref Range    Troponin, High Sensitivity 6 0 - 76 ng/L       EKG: If performed, independent interpretation documented below in the MDM section    RADIOLOGY:  Non-plain film images such as CT, Ultrasound and MRI are read by the radiologist. Plain radiographic images are visualized and preliminarily interpreted by the ED Provider with the findings documented in the MDM section.     Interpretation per the Radiologist below, if available at the time of this note:     XR CHEST PORTABLE   Final Result   No acute intrathoracic process is identified.             Electronically signed by HUBERT LUCERO           PROCEDURES   Unless otherwise noted below, none  Procedures     CRITICAL CARE TIME   N/a    EMERGENCY DEPARTMENT COURSE and DIFFERENTIAL DIAGNOSIS/MDM   Vitals:    Vitals:    06/30/25 1209 06/30/25 1315 06/30/25 1330   BP: (!) 188/107 (!) 160/95 (!) 151/90   Pulse: (!) 102 89 91   Resp: 20 18 18   Temp: 97.8 °F (36.6 °C)     TempSrc: Temporal     SpO2: 99% 97% 98%   Weight: 115.7 kg (255 lb)     Height: 1.829 m (6')          Patient was given the following medications:  Medications   sodium chloride 0.9 % bolus 1,000 mL (1,000 mLs IntraVENous New Bag 6/30/25 1304)   ondansetron (ZOFRAN) injection 4 mg (4 mg IntraVENous Given 6/30/25 1307)       Medical Decision Making  Amount and/or Complexity of Data Reviewed  Labs: ordered.  Radiology: ordered. Decision-making details documented in ED Course.  ECG/medicine tests: ordered.    Risk  Prescription drug management.      Pt presents with lightheadedness with some nausea and diarrhea, possibly due to exposure  acute changes [JM]   1343 Labs reassuring thus far.  Do not suspect acute bacterial infection or other cause of acute diarrhea.  Will recommend fluids and antidiarrheals as needed.  Suspect lightheadedness due to dehydration given tachycardia on admission and history of heat exposure. [JM]   1347 Patient states he is feeling much better.  Denying lightheadedness and headache at this time.  No focal tenderness on repeat abdominal exam.  Requesting day off to rest and hydrate at home.  Stable for discharge.  Discussed return precautions [JM]      ED Course User Index  [JM] Veena Pastor PA-C         Heart Score: n/a  NIHSS: 0      FINAL IMPRESSION     1. Intermittent lightheadedness    2. Diarrhea, unspecified type          DISPOSITION/PLAN   Matthew COLLEEN Cortez's  results have been reviewed with him.  He has been counseled regarding his diagnosis, treatment, and plan.  He verbally conveys understanding and agreement of the signs, symptoms, diagnosis, treatment and prognosis and additionally agrees to follow up as discussed.  He also agrees with the care-plan and conveys that all of his questions have been answered.  I have also provided discharge instructions for him that include: educational information regarding their diagnosis and treatment, and list of reasons why they would want to return to the ED prior to their follow-up appointment, should his condition change.     CLINICAL IMPRESSION    Discharge Note: The patient is stable for discharge home. The signs, symptoms, diagnosis, and discharge instructions have been discussed, understanding conveyed, and agreed upon. The patient is to follow up as recommended or return to ER should their symptoms worsen.      PATIENT REFERRED TO:  No follow-up provider specified.     DISCHARGE MEDICATIONS:     Medication List        START taking these medications      ondansetron 4 MG tablet  Commonly known as: Zofran  Take 1 tablet by mouth every 8 hours as needed for Nausea or

## 2025-06-30 NOTE — ED NOTES
Pt presents ambulatory to ED complaining of headache without vision changes and dizziness x3 days. Pt denies chest pain or SOB. Pt has full sensation in extremity bilaterally with equal and strong . Pt does report his right pupil is larger than the left and has been examined without concern. Pt reports having 2 episodes of loose stool PTA, he denies abdominal pain. Pt has hx of HTN and reports being prescribed medication but denies taking it as directed. He states \"my pressure has been low\". Pt denies checking BP at home. Pt is alert and oriented x 4, RR even and unlabored, skin is warm and dry. Assessment completed and pt updated on plan of care.        Emergency Department Nursing Plan of Care        The Nursing Plan of Care is developed from the Nursing assessment and Emergency Department Attending provider initial evaluation.  The plan of care may be reviewed in the “ED Provider note”.

## 2025-06-30 NOTE — DISCHARGE INSTRUCTIONS
Take Zofran up to a every 8 hours as needed for nausea.  Ensure you are hydrating well at home and continue taking your blood pressure medication as prescribed.  Follow-up with your primary care doctor.  Take over-the-counter Motrin and Tylenol for headaches at home.  If you experience any worsening symptoms such as severe headaches that do not respond to medicine, abdominal pain, chest pain, difficulty breathing, or other acute complaints, return to the ER. Thank you for choosing [facility] to be a part of your care today.

## 2025-07-12 DIAGNOSIS — K21.00 GASTRO-ESOPHAGEAL REFLUX DISEASE WITH ESOPHAGITIS, WITHOUT BLEEDING: ICD-10-CM

## 2025-07-12 DIAGNOSIS — I10 PRIMARY HYPERTENSION: ICD-10-CM

## 2025-07-14 RX ORDER — TRIAMCINOLONE ACETONIDE 1 MG/G
OINTMENT TOPICAL 2 TIMES DAILY
Qty: 80 G | Refills: 2 | Status: SHIPPED | OUTPATIENT
Start: 2025-07-14

## 2025-07-14 RX ORDER — AMLODIPINE AND BENAZEPRIL HYDROCHLORIDE 10; 20 MG/1; MG/1
1 CAPSULE ORAL DAILY
Qty: 90 CAPSULE | Refills: 1 | Status: SHIPPED | OUTPATIENT
Start: 2025-07-14

## 2025-07-14 RX ORDER — OMEPRAZOLE 20 MG/1
CAPSULE, DELAYED RELEASE ORAL
Qty: 30 CAPSULE | Refills: 2 | Status: SHIPPED | OUTPATIENT
Start: 2025-07-14

## (undated) DEVICE — SYRINGE,EAR/ULCER, 2 OZ, STERILE: Brand: MEDLINE

## (undated) DEVICE — SOLUTION SCRB 2OZ 10% POVIDONE IOD ANTIMIC BTL

## (undated) DEVICE — DRAPE,EXTREMITY,89X128,STERILE: Brand: MEDLINE

## (undated) DEVICE — Device: Brand: MICROAIRE®

## (undated) DEVICE — COVER LT HNDL PLAS RIG 1 PER PK

## (undated) DEVICE — KIT,1200CC CANISTER,3/16"X6' TUBING: Brand: MEDLINE INDUSTRIES, INC.

## (undated) DEVICE — PRECISION THIN (9.0 X 0.38 X 31.0MM)

## (undated) DEVICE — SUTURE NONABSORBABLE MONOFILAMENT 5-0 PS-2 18 IN BLK ETHILON 1666H

## (undated) DEVICE — PREP SKN CHLRAPRP APL 26ML STR --

## (undated) DEVICE — MINOR BASIN -SMH: Brand: MEDLINE INDUSTRIES, INC.

## (undated) DEVICE — BANDAGE,GAUZE,BULKEE II,4.5"X4.1YD,STRL: Brand: MEDLINE

## (undated) DEVICE — DRAPE,REIN 53X77,STERILE: Brand: MEDLINE

## (undated) DEVICE — SUTURE VCRL SZ 4-0 L27IN ABSRB UD L19MM FS-2 3/8 CIR REV J422H

## (undated) DEVICE — ZIMMER® STERILE DISPOSABLE TOURNIQUET CUFF WITH PROTECTIVE SLEEVE AND PLC, DUAL PORT, SINGLE BLADDER, 18 IN. (46 CM)

## (undated) DEVICE — BANDAGE,GAUZE,CONFORMING,3"X75",STRL,LF: Brand: MEDLINE

## (undated) DEVICE — GLOVE SURG SZ 65 THK91MIL LTX FREE SYN POLYISOPRENE

## (undated) DEVICE — Device: Brand: JELCO

## (undated) DEVICE — SUTURE MCRYL SZ 5-0 L18IN ABSRB UD PC-3 L16MM 3/8 CIR Y844G

## (undated) DEVICE — SOLUTION IRRIG 1000ML 0.9% SOD CHL USP POUR PLAS BTL

## (undated) DEVICE — SPONGE GZ W4XL4IN COT 12 PLY TYP VII WVN C FLD DSGN

## (undated) DEVICE — SYR 10ML LUER LOK 1/5ML GRAD --

## (undated) DEVICE — STOCKINETTE,DOUBLE PLY,6X54,STERILE: Brand: MEDLINE

## (undated) DEVICE — HYPODERMIC SAFETY NEEDLE: Brand: MONOJECT

## (undated) DEVICE — DRSG GZ OIL EMUL CURAD 3X3 --